# Patient Record
Sex: FEMALE | Race: WHITE | NOT HISPANIC OR LATINO
[De-identification: names, ages, dates, MRNs, and addresses within clinical notes are randomized per-mention and may not be internally consistent; named-entity substitution may affect disease eponyms.]

---

## 2017-03-20 ENCOUNTER — MEDICATION RENEWAL (OUTPATIENT)
Age: 27
End: 2017-03-20

## 2017-05-18 ENCOUNTER — MEDICATION RENEWAL (OUTPATIENT)
Age: 27
End: 2017-05-18

## 2017-07-17 ENCOUNTER — MEDICATION RENEWAL (OUTPATIENT)
Age: 27
End: 2017-07-17

## 2017-09-05 ENCOUNTER — INPATIENT (INPATIENT)
Facility: HOSPITAL | Age: 27
LOS: 1 days | Discharge: HOME | DRG: 864 | End: 2017-09-07
Attending: INTERNAL MEDICINE | Admitting: INTERNAL MEDICINE
Payer: COMMERCIAL

## 2017-09-05 VITALS
WEIGHT: 119.93 LBS | SYSTOLIC BLOOD PRESSURE: 110 MMHG | HEART RATE: 140 BPM | HEIGHT: 61 IN | OXYGEN SATURATION: 99 % | DIASTOLIC BLOOD PRESSURE: 78 MMHG | RESPIRATION RATE: 16 BRPM | TEMPERATURE: 100 F

## 2017-09-05 DIAGNOSIS — M35.1 OTHER OVERLAP SYNDROMES: ICD-10-CM

## 2017-09-05 DIAGNOSIS — Z29.9 ENCOUNTER FOR PROPHYLACTIC MEASURES, UNSPECIFIED: ICD-10-CM

## 2017-09-05 DIAGNOSIS — E03.9 HYPOTHYROIDISM, UNSPECIFIED: ICD-10-CM

## 2017-09-05 DIAGNOSIS — R51 HEADACHE: ICD-10-CM

## 2017-09-05 DIAGNOSIS — K21.9 GASTRO-ESOPHAGEAL REFLUX DISEASE WITHOUT ESOPHAGITIS: ICD-10-CM

## 2017-09-05 DIAGNOSIS — R50.9 FEVER, UNSPECIFIED: ICD-10-CM

## 2017-09-05 DIAGNOSIS — A41.9 SEPSIS, UNSPECIFIED ORGANISM: ICD-10-CM

## 2017-09-05 LAB
ALBUMIN SERPL ELPH-MCNC: 3.8 G/DL — SIGNIFICANT CHANGE UP (ref 3.3–5)
ALP SERPL-CCNC: 73 U/L — SIGNIFICANT CHANGE UP (ref 40–120)
ALT FLD-CCNC: 25 U/L — SIGNIFICANT CHANGE UP (ref 12–78)
ANION GAP SERPL CALC-SCNC: 11 MMOL/L — SIGNIFICANT CHANGE UP (ref 5–17)
ANISOCYTOSIS BLD QL: SLIGHT — SIGNIFICANT CHANGE UP
APPEARANCE UR: CLEAR — SIGNIFICANT CHANGE UP
APTT BLD: 29.1 SEC — SIGNIFICANT CHANGE UP (ref 27.5–37.4)
AST SERPL-CCNC: 26 U/L — SIGNIFICANT CHANGE UP (ref 15–37)
BACTERIA # UR AUTO: ABNORMAL
BILIRUB SERPL-MCNC: 0.5 MG/DL — SIGNIFICANT CHANGE UP (ref 0.2–1.2)
BILIRUB UR-MCNC: NEGATIVE — SIGNIFICANT CHANGE UP
BUN SERPL-MCNC: 10 MG/DL — SIGNIFICANT CHANGE UP (ref 7–23)
CALCIUM SERPL-MCNC: 8.6 MG/DL — SIGNIFICANT CHANGE UP (ref 8.5–10.1)
CHLORIDE SERPL-SCNC: 103 MMOL/L — SIGNIFICANT CHANGE UP (ref 96–108)
CO2 SERPL-SCNC: 24 MMOL/L — SIGNIFICANT CHANGE UP (ref 22–31)
COLOR SPEC: YELLOW — SIGNIFICANT CHANGE UP
COMMENT - URINE: SIGNIFICANT CHANGE UP
CREAT SERPL-MCNC: 0.71 MG/DL — SIGNIFICANT CHANGE UP (ref 0.5–1.3)
DIFF PNL FLD: NEGATIVE — SIGNIFICANT CHANGE UP
ELLIPTOCYTES BLD QL SMEAR: SLIGHT — SIGNIFICANT CHANGE UP
EPI CELLS # UR: SIGNIFICANT CHANGE UP
GLUCOSE SERPL-MCNC: 86 MG/DL — SIGNIFICANT CHANGE UP (ref 70–99)
GLUCOSE UR QL: NEGATIVE — SIGNIFICANT CHANGE UP
HCG SERPL-ACNC: <1 MIU/ML — SIGNIFICANT CHANGE UP
HCT VFR BLD CALC: 36.5 % — SIGNIFICANT CHANGE UP (ref 34.5–45)
HGB BLD-MCNC: 12.2 G/DL — SIGNIFICANT CHANGE UP (ref 11.5–15.5)
HYALINE CASTS # UR AUTO: ABNORMAL /LPF
INR BLD: 1.11 RATIO — SIGNIFICANT CHANGE UP (ref 0.88–1.16)
KETONES UR-MCNC: ABNORMAL
LACTATE SERPL-SCNC: 1.8 MMOL/L — SIGNIFICANT CHANGE UP (ref 0.7–2)
LEUKOCYTE ESTERASE UR-ACNC: ABNORMAL
LYMPHOCYTES # BLD AUTO: 9 % — LOW (ref 13–44)
MCHC RBC-ENTMCNC: 28.4 PG — SIGNIFICANT CHANGE UP (ref 27–34)
MCHC RBC-ENTMCNC: 33.5 GM/DL — SIGNIFICANT CHANGE UP (ref 32–36)
MCV RBC AUTO: 84.8 FL — SIGNIFICANT CHANGE UP (ref 80–100)
MICROCYTES BLD QL: SLIGHT — SIGNIFICANT CHANGE UP
MONOCYTES NFR BLD AUTO: 1 % — SIGNIFICANT CHANGE UP (ref 1–9)
NEUTROPHILS NFR BLD AUTO: 65 % — SIGNIFICANT CHANGE UP (ref 43–77)
NEUTS BAND # BLD: 25 % — HIGH (ref 0–8)
NITRITE UR-MCNC: NEGATIVE — SIGNIFICANT CHANGE UP
PH UR: 5 — SIGNIFICANT CHANGE UP (ref 5–8)
PLAT MORPH BLD: NORMAL — SIGNIFICANT CHANGE UP
PLATELET # BLD AUTO: 379 K/UL — SIGNIFICANT CHANGE UP (ref 150–400)
POIKILOCYTOSIS BLD QL AUTO: SLIGHT — SIGNIFICANT CHANGE UP
POTASSIUM SERPL-MCNC: 3.5 MMOL/L — SIGNIFICANT CHANGE UP (ref 3.5–5.3)
POTASSIUM SERPL-SCNC: 3.5 MMOL/L — SIGNIFICANT CHANGE UP (ref 3.5–5.3)
PROT SERPL-MCNC: 7.9 G/DL — SIGNIFICANT CHANGE UP (ref 6–8.3)
PROT UR-MCNC: 25 MG/DL
PROTHROM AB SERPL-ACNC: 12.1 SEC — SIGNIFICANT CHANGE UP (ref 9.8–12.7)
RAPID RVP RESULT: SIGNIFICANT CHANGE UP
RBC # BLD: 4.31 M/UL — SIGNIFICANT CHANGE UP (ref 3.8–5.2)
RBC # FLD: 12.4 % — SIGNIFICANT CHANGE UP (ref 10.3–14.5)
RBC BLD AUTO: SIGNIFICANT CHANGE UP
RBC CASTS # UR COMP ASSIST: ABNORMAL /HPF (ref 0–4)
SODIUM SERPL-SCNC: 138 MMOL/L — SIGNIFICANT CHANGE UP (ref 135–145)
SP GR SPEC: 1.02 — SIGNIFICANT CHANGE UP (ref 1.01–1.02)
UROBILINOGEN FLD QL: NEGATIVE — SIGNIFICANT CHANGE UP
WBC # BLD: 16.9 K/UL — HIGH (ref 3.8–10.5)
WBC # FLD AUTO: 16.9 K/UL — HIGH (ref 3.8–10.5)
WBC UR QL: ABNORMAL

## 2017-09-05 PROCEDURE — 99285 EMERGENCY DEPT VISIT HI MDM: CPT

## 2017-09-05 PROCEDURE — 71010: CPT | Mod: 26

## 2017-09-05 PROCEDURE — 70450 CT HEAD/BRAIN W/O DYE: CPT | Mod: 26

## 2017-09-05 RX ORDER — HYDROXYCHLOROQUINE SULFATE 200 MG
200 TABLET ORAL DAILY
Qty: 0 | Refills: 0 | Status: DISCONTINUED | OUTPATIENT
Start: 2017-09-05 | End: 2017-09-07

## 2017-09-05 RX ORDER — ENOXAPARIN SODIUM 100 MG/ML
40 INJECTION SUBCUTANEOUS EVERY 24 HOURS
Qty: 0 | Refills: 0 | Status: DISCONTINUED | OUTPATIENT
Start: 2017-09-05 | End: 2017-09-07

## 2017-09-05 RX ORDER — AZITHROMYCIN 500 MG/1
500 TABLET, FILM COATED ORAL EVERY 24 HOURS
Qty: 0 | Refills: 0 | Status: DISCONTINUED | OUTPATIENT
Start: 2017-09-06 | End: 2017-09-06

## 2017-09-05 RX ORDER — CEFTRIAXONE 500 MG/1
1 INJECTION, POWDER, FOR SOLUTION INTRAMUSCULAR; INTRAVENOUS EVERY 24 HOURS
Qty: 0 | Refills: 0 | Status: DISCONTINUED | OUTPATIENT
Start: 2017-09-06 | End: 2017-09-06

## 2017-09-05 RX ORDER — OXYCODONE AND ACETAMINOPHEN 5; 325 MG/1; MG/1
1 TABLET ORAL EVERY 4 HOURS
Qty: 0 | Refills: 0 | Status: DISCONTINUED | OUTPATIENT
Start: 2017-09-05 | End: 2017-09-07

## 2017-09-05 RX ORDER — CEFTRIAXONE 500 MG/1
1 INJECTION, POWDER, FOR SOLUTION INTRAMUSCULAR; INTRAVENOUS ONCE
Qty: 0 | Refills: 0 | Status: COMPLETED | OUTPATIENT
Start: 2017-09-05 | End: 2017-09-05

## 2017-09-05 RX ORDER — ONDANSETRON 8 MG/1
4 TABLET, FILM COATED ORAL ONCE
Qty: 0 | Refills: 0 | Status: COMPLETED | OUTPATIENT
Start: 2017-09-05 | End: 2017-09-05

## 2017-09-05 RX ORDER — MORPHINE SULFATE 50 MG/1
2 CAPSULE, EXTENDED RELEASE ORAL EVERY 4 HOURS
Qty: 0 | Refills: 0 | Status: DISCONTINUED | OUTPATIENT
Start: 2017-09-05 | End: 2017-09-07

## 2017-09-05 RX ORDER — VANCOMYCIN HCL 1 G
1000 VIAL (EA) INTRAVENOUS ONCE
Qty: 0 | Refills: 0 | Status: COMPLETED | OUTPATIENT
Start: 2017-09-05 | End: 2017-09-05

## 2017-09-05 RX ORDER — SODIUM CHLORIDE 9 MG/ML
1000 INJECTION INTRAMUSCULAR; INTRAVENOUS; SUBCUTANEOUS
Qty: 0 | Refills: 0 | Status: DISCONTINUED | OUTPATIENT
Start: 2017-09-05 | End: 2017-09-07

## 2017-09-05 RX ORDER — SODIUM CHLORIDE 9 MG/ML
1000 INJECTION INTRAMUSCULAR; INTRAVENOUS; SUBCUTANEOUS
Qty: 0 | Refills: 0 | Status: COMPLETED | OUTPATIENT
Start: 2017-09-05 | End: 2017-09-05

## 2017-09-05 RX ORDER — PANTOPRAZOLE SODIUM 20 MG/1
40 TABLET, DELAYED RELEASE ORAL
Qty: 0 | Refills: 0 | Status: DISCONTINUED | OUTPATIENT
Start: 2017-09-05 | End: 2017-09-07

## 2017-09-05 RX ORDER — ONDANSETRON 8 MG/1
4 TABLET, FILM COATED ORAL EVERY 6 HOURS
Qty: 0 | Refills: 0 | Status: DISCONTINUED | OUTPATIENT
Start: 2017-09-05 | End: 2017-09-05

## 2017-09-05 RX ORDER — ONDANSETRON 8 MG/1
4 TABLET, FILM COATED ORAL EVERY 4 HOURS
Qty: 0 | Refills: 0 | Status: DISCONTINUED | OUTPATIENT
Start: 2017-09-05 | End: 2017-09-07

## 2017-09-05 RX ORDER — VANCOMYCIN HCL 1 G
1000 VIAL (EA) INTRAVENOUS EVERY 24 HOURS
Qty: 0 | Refills: 0 | Status: DISCONTINUED | OUTPATIENT
Start: 2017-09-06 | End: 2017-09-07

## 2017-09-05 RX ORDER — HYDROXYCHLOROQUINE SULFATE 200 MG
125 TABLET ORAL DAILY
Qty: 0 | Refills: 0 | Status: DISCONTINUED | OUTPATIENT
Start: 2017-09-05 | End: 2017-09-05

## 2017-09-05 RX ORDER — AZITHROMYCIN 500 MG/1
500 TABLET, FILM COATED ORAL ONCE
Qty: 0 | Refills: 0 | Status: COMPLETED | OUTPATIENT
Start: 2017-09-05 | End: 2017-09-05

## 2017-09-05 RX ORDER — VANCOMYCIN HCL 1 G
VIAL (EA) INTRAVENOUS
Qty: 0 | Refills: 0 | Status: DISCONTINUED | OUTPATIENT
Start: 2017-09-05 | End: 2017-09-07

## 2017-09-05 RX ORDER — LEVOTHYROXINE SODIUM 125 MCG
137 TABLET ORAL DAILY
Qty: 0 | Refills: 0 | Status: DISCONTINUED | OUTPATIENT
Start: 2017-09-05 | End: 2017-09-07

## 2017-09-05 RX ORDER — OXYCODONE AND ACETAMINOPHEN 5; 325 MG/1; MG/1
2 TABLET ORAL EVERY 4 HOURS
Qty: 0 | Refills: 0 | Status: DISCONTINUED | OUTPATIENT
Start: 2017-09-05 | End: 2017-09-07

## 2017-09-05 RX ORDER — ACETAMINOPHEN 500 MG
650 TABLET ORAL ONCE
Qty: 0 | Refills: 0 | Status: COMPLETED | OUTPATIENT
Start: 2017-09-05 | End: 2017-09-05

## 2017-09-05 RX ORDER — ACETAMINOPHEN 500 MG
650 TABLET ORAL EVERY 6 HOURS
Qty: 0 | Refills: 0 | Status: DISCONTINUED | OUTPATIENT
Start: 2017-09-05 | End: 2017-09-07

## 2017-09-05 RX ORDER — CYCLOBENZAPRINE HYDROCHLORIDE 10 MG/1
5 TABLET, FILM COATED ORAL THREE TIMES A DAY
Qty: 0 | Refills: 0 | Status: DISCONTINUED | OUTPATIENT
Start: 2017-09-05 | End: 2017-09-07

## 2017-09-05 RX ORDER — ONDANSETRON 8 MG/1
4 TABLET, FILM COATED ORAL EVERY 4 HOURS
Qty: 0 | Refills: 0 | Status: DISCONTINUED | OUTPATIENT
Start: 2017-09-05 | End: 2017-09-05

## 2017-09-05 RX ADMIN — MORPHINE SULFATE 2 MILLIGRAM(S): 50 CAPSULE, EXTENDED RELEASE ORAL at 20:25

## 2017-09-05 RX ADMIN — MORPHINE SULFATE 2 MILLIGRAM(S): 50 CAPSULE, EXTENDED RELEASE ORAL at 21:00

## 2017-09-05 RX ADMIN — AZITHROMYCIN 255 MILLIGRAM(S): 500 TABLET, FILM COATED ORAL at 18:18

## 2017-09-05 RX ADMIN — Medication 650 MILLIGRAM(S): at 15:54

## 2017-09-05 RX ADMIN — OXYCODONE AND ACETAMINOPHEN 1 TABLET(S): 5; 325 TABLET ORAL at 22:11

## 2017-09-05 RX ADMIN — SODIUM CHLORIDE 75 MILLILITER(S): 9 INJECTION INTRAMUSCULAR; INTRAVENOUS; SUBCUTANEOUS at 20:24

## 2017-09-05 RX ADMIN — CEFTRIAXONE 100 GRAM(S): 500 INJECTION, POWDER, FOR SOLUTION INTRAMUSCULAR; INTRAVENOUS at 17:48

## 2017-09-05 RX ADMIN — MORPHINE SULFATE 2 MILLIGRAM(S): 50 CAPSULE, EXTENDED RELEASE ORAL at 23:32

## 2017-09-05 RX ADMIN — ONDANSETRON 4 MILLIGRAM(S): 8 TABLET, FILM COATED ORAL at 20:25

## 2017-09-05 RX ADMIN — SODIUM CHLORIDE 1000 MILLILITER(S): 9 INJECTION INTRAMUSCULAR; INTRAVENOUS; SUBCUTANEOUS at 14:45

## 2017-09-05 RX ADMIN — SODIUM CHLORIDE 1000 MILLILITER(S): 9 INJECTION INTRAMUSCULAR; INTRAVENOUS; SUBCUTANEOUS at 15:45

## 2017-09-05 RX ADMIN — ONDANSETRON 4 MILLIGRAM(S): 8 TABLET, FILM COATED ORAL at 17:49

## 2017-09-05 RX ADMIN — CYCLOBENZAPRINE HYDROCHLORIDE 5 MILLIGRAM(S): 10 TABLET, FILM COATED ORAL at 23:02

## 2017-09-05 RX ADMIN — Medication 250 MILLIGRAM(S): at 23:00

## 2017-09-05 NOTE — H&P ADULT - NSHPPHYSICALEXAM_GEN_ALL_CORE
Physical Exam:  General: Well developed, well nourished, NAD  HEENT: NCAT, PERRLA, EOMI bl, moist mucous membranes   Neck: Supple, nontender  Neurology: A&Ox3, pt able to move all 4 extremities and reposition self in bed  Respiratory: CTA B/L, No W/R/R  CV: regular rhythm, elevated rate, +S1/S2, no murmurs, rubs or gallops  Abdominal: Soft, NT, ND +BSx4  Extremities: No C/C/E, + peripheral pulses, no calf tenderness  MSK: no joint swelling, no tenderness to palpation on back, neck, or b/l legs   Skin: warm, dry, normal color, petechial rash over face and chest (baseline as per pt and family) Physical Exam:  Vital Signs Last 24 Hrs  T(C): 37.2 (05 Sep 2017 16:40), Max: 37.8 (05 Sep 2017 14:10)  T(F): 98.9 (05 Sep 2017 16:40), Max: 100.1 (05 Sep 2017 14:10)  HR: 118 (05 Sep 2017 16:40) (118 - 140)  BP: 122/74 (05 Sep 2017 16:40) (110/78 - 122/74)  BP(mean): --  RR: 16 (05 Sep 2017 16:40) (16 - 16)  SpO2: 100% (05 Sep 2017 16:40) (99% - 100%)    General: Well developed, well nourished, NAD  HEENT: NCAT, PERRLA, EOMI bl, moist mucous membranes   Neck: Supple, nontender  Neurology: A&Ox3, pt able to move all 4 extremities and reposition self in bed, negative Brudzinski's and Kerning's signs  Respiratory: CTA B/L, No W/R/R  CV: regular rhythm, elevated rate, +S1/S2, no murmurs, rubs or gallops  Abdominal: Soft, NT, ND +BSx4  Extremities: No C/C/E, + peripheral pulses, no calf tenderness  MSK: no joint swelling, no tenderness to palpation on back, neck, or b/l legs   Skin: warm, dry, normal color, petechial rash over face and chest (baseline as per pt and family)

## 2017-09-05 NOTE — ED ADULT NURSE NOTE - OBJECTIVE STATEMENT
Pt reports Hx autoimmune disease. Pt states she developed a severe headache then began to experience neck/upper back & B/L leg pain. Pt is also C/O N/V. Pt reports fever but is unsure of Tmax

## 2017-09-05 NOTE — H&P ADULT - PROBLEM SELECTOR PLAN 5
lovenox  protonix DVT prophylaxis: lovenox  GI prophylaxis: protonix continue protonix - Chronic  - continue protonix

## 2017-09-05 NOTE — ED PROVIDER NOTE - PROGRESS NOTE DETAILS
Pt with moderate improvement, less tachycardia. Still with nausea, vomited. Mild persistent ha. No photopphobia.   Discussed with patient and family regarding symptoms, labs, and clinical presentation. Discussed the possibility of meningitis. Discussed the risks, benefits, and alternatives of lumbar puncture. Discussed the risks associated with missing a potential serious diagnosis if no LP is done. An opportunity to ask questions was given. After answering all questions and adressing all concerns it was decided by the patient that they will be refusing LP. Understanding of risks was verbalized. A copy of all available results was explained and given. Patient will follow-up with outpatient medical doctor as soon as possible. Dw Dr TANVI Nicole, will see pt to admit. Agree with abx Pt with moderate improvement, less tachycardia. Still with nausea, vomited. Mild persistent ha. No photopphobia.   Discussed with patient and family regarding symptoms, labs, and clinical presentation. Discussed the possibility of meningitis. Discussed the risks, benefits, and alternatives of lumbar puncture. Discussed the risks associated with missing a potential serious diagnosis if no LP is done. An opportunity to ask questions was given. After answering all questions and adressing all concerns it was decided by the patient that they will be refusing LP. Understanding of risks was verbalized.Pt will allow admission for further eval.

## 2017-09-05 NOTE — ED PROVIDER NOTE - OBJECTIVE STATEMENT
28 yo F p/w fever, chills and myalgias x past ~ 1 day, onset this am. Some gen headache. no neck pain / stiffness. NO abd pain. No n/v/d. No cough / sputum. no rash. NO recent inj / trauma. No agg/allev factors. NO other inj or co.

## 2017-09-05 NOTE — H&P ADULT - PROBLEM SELECTOR PLAN 6
DVT prophylaxis: lovenox  GI prophylaxis: protonix - DVT prophylaxis: lovenox  - GI prophylaxis: protonix

## 2017-09-05 NOTE — PROGRESS NOTE ADULT - SUBJECTIVE AND OBJECTIVE BOX
neuro cons dict.  FERNANDEZ WITH FEVER, R/O CNS INFECTION.  REFUSING LP.  ROCEPHIN VANCOMYYCIN.  ID EVALUATION.  ANALGESIC,

## 2017-09-05 NOTE — ED PROVIDER NOTE - CHPI ED SYMPTOMS NEG
no vomiting/no chills/no abdominal pain/no decreased eating/drinking/no diarrhea/no cough/no shortness of breath

## 2017-09-05 NOTE — H&P ADULT - PROBLEM SELECTOR PLAN 1
possibly due to pneumonia (xray bilateral infiltrates)  iv rocephin z max  follow up all cultures, add on stat procalcitonin  id consult dr ferris et al  ivf ns75 hr possibly due to pneumonia (xray bilateral infiltrates)  stat rvp (neg)  iv rocephin z max bacid (1 tid)  follow up all cultures, add on stat procalcitonin  id consult dr perry al  ivf ns75 hr possibly due to pneumonia (xray bilateral infiltrates)  stat rvp (neg)  iv rocephin z max bacid (1 tid)  follow up all cultures, add on stat procalcitonin  id consult dr ferris et al  tylenol prn for fever  ivf ns75 hr  f/u blood and urine cxs  f/u AM labs possibly due to pneumonia (xray bilateral infiltrates)  stat rvp (neg)  iv rocephin z max bacid (1 tid)  follow up all cultures, add on stat procalcitonin  Neuro consult (Dr. Walsh) to r/o CNS infxn, pt refused LP  id consult dr ferris et al  tylenol prn for fever  ivf ns75 hr  f/u blood and urine cxs  f/u AM labs admit to remote tele  possibly due to pneumonia (xray bilateral infiltrates)  stat rvp (neg)  IV rocephin, IV zithromax, IV vanco  ID consult (Dr. Bermudez), recommendations are appreciated  tylenol prn for fever  zofran prn for nausea  percocet prn for mild and moderate pain  morphine prn for severe pain  flexeril prn for muscle stiffness  ivf ns75 hr  f/u stat procalcitonin  f/u blood and urine cxs  f/u AM labs - admit to remote tele  - possibly due to pneumonia (xray bilateral infiltrates)  - RVP negative  - Procalcitonin mildly elevated  - Continue IV rocephin, IV zithromax, IV vanco  - ID consult (Dr. Bermudez), recommendations are appreciated  - tylenol prn for fever  - zofran prn for nausea  - percocet prn for mild and moderate pain  - morphine prn for severe pain  - flexeril prn for muscle stiffness  - ivf ns75 hr  - f/u blood and urine cxs  - f/u AM labs

## 2017-09-05 NOTE — H&P ADULT - ASSESSMENT
26 y/o F with PMH of Mixed Connective Tissue Disease (MCTD), hypothyroidism, and GERD presents to the ED with HA and vomiting x 1 day, admitted for sepsis 2/2 possible PNA.

## 2017-09-05 NOTE — H&P ADULT - PROBLEM SELECTOR PLAN 2
protonix continue protonix no neck stiffness, negative Brudzinski's and Kerning's signs  Neuro (Dr. Walsh) consulted, pt refused LP - no neck stiffness, negative Brudzinski's and Kerning's signs  - Neuro (Dr. Walsh) consulted, pt refused LP

## 2017-09-05 NOTE — H&P ADULT - NSHPREVIEWOFSYSTEMS_GEN_ALL_CORE
Constitutional: Positive: chills, fever (in the ED); denies diaphoresis   HEENT: denies blurry vision, photophobia  Respiratory: denies SOB, JENSEN, cough, sputum production  Cardiovascular: denies CP, palpitations, edema  Gastrointestinal: Positive: nausea, vomiting; denies diarrhea, constipation, abdominal pain, melena, hematochezia   Genitourinary: denies dysuria, frequency, urgency, hematuria   Skin/Breast: Positive: petechial rash on chest and face (chronic, baseline as per pt and family)  Musculoskeletal: Positive: myalgias in neck, upper back, and posterior legs; denies joint swelling, muscle weakness  Neurologic: Positive: headache, lightheadedness/dizziness when changing positions (orthostatic); denies weakness  Psychiatric: denies feeling anxious, depressed  Hematology/Oncology: denies bruising   ROS negative except as noted above

## 2017-09-05 NOTE — H&P ADULT - ATTENDING COMMENTS
pt seen examined and admitted  here w leukocytosis meeting criteria for sepsis. ua pos however no urinary sx. c/o headache and severe mylagias all over sx typical of her mctd.   neck "sore" not stiff, supple full rom, neg brudzinski no photophobia  does not want spinal tap (declined w er doctor and neuro last night as well)  although orlin infiltrates on cxr no cough or uri sx whatsover and lungs clear   id consult dr howard  will discuss again w dr glaser  time spent on admission 65 mins

## 2017-09-05 NOTE — ED PROVIDER NOTE - ENMT, MLM
Airway patent, Nasal mucosa clear. Mouth with normal mucosa. Throat has no vesicles, no oropharyngeal exudates and uvula is midline. MM Moist. Neck supple. no meningeal signs.l

## 2017-09-05 NOTE — ED ADULT NURSE REASSESSMENT NOTE - NS ED NURSE REASSESS COMMENT FT1
spoke with the Resident Physician Leslee who is in the department; re: isolation per MD no need to isolate the patient right now and they will consulting ID for patient's fever; MD also made aware that patient is tachycardic; MD stated she is aware reason she ordered for remote telemetry for the patient; pain medications are also ordered for the patient PRN.

## 2017-09-05 NOTE — H&P ADULT - NSHPSOCIALHISTORY_GEN_ALL_CORE
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

## 2017-09-06 LAB
ANION GAP SERPL CALC-SCNC: 11 MMOL/L — SIGNIFICANT CHANGE UP (ref 5–17)
BUN SERPL-MCNC: 10 MG/DL — SIGNIFICANT CHANGE UP (ref 7–23)
C3 SERPL-MCNC: 96 MG/DL — SIGNIFICANT CHANGE UP (ref 80–180)
C4 SERPL-MCNC: 15 MG/DL — SIGNIFICANT CHANGE UP (ref 10–45)
CALCIUM SERPL-MCNC: 7.7 MG/DL — LOW (ref 8.5–10.1)
CHLORIDE SERPL-SCNC: 104 MMOL/L — SIGNIFICANT CHANGE UP (ref 96–108)
CO2 SERPL-SCNC: 23 MMOL/L — SIGNIFICANT CHANGE UP (ref 22–31)
CREAT SERPL-MCNC: 0.5 MG/DL — SIGNIFICANT CHANGE UP (ref 0.5–1.3)
CRP SERPL-MCNC: 13.9 MG/DL — HIGH (ref 0–0.4)
CULTURE RESULTS: NO GROWTH — SIGNIFICANT CHANGE UP
ERYTHROCYTE [SEDIMENTATION RATE] IN BLOOD: 22 MM/HR — HIGH (ref 0–15)
GLUCOSE SERPL-MCNC: 97 MG/DL — SIGNIFICANT CHANGE UP (ref 70–99)
HCT VFR BLD CALC: 29.6 % — LOW (ref 34.5–45)
HGB BLD-MCNC: 10.1 G/DL — LOW (ref 11.5–15.5)
MCHC RBC-ENTMCNC: 28.4 PG — SIGNIFICANT CHANGE UP (ref 27–34)
MCHC RBC-ENTMCNC: 34.1 GM/DL — SIGNIFICANT CHANGE UP (ref 32–36)
MCV RBC AUTO: 83.4 FL — SIGNIFICANT CHANGE UP (ref 80–100)
PLATELET # BLD AUTO: 323 K/UL — SIGNIFICANT CHANGE UP (ref 150–400)
POTASSIUM SERPL-MCNC: 3.5 MMOL/L — SIGNIFICANT CHANGE UP (ref 3.5–5.3)
POTASSIUM SERPL-SCNC: 3.5 MMOL/L — SIGNIFICANT CHANGE UP (ref 3.5–5.3)
RBC # BLD: 3.55 M/UL — LOW (ref 3.8–5.2)
RBC # FLD: 12.2 % — SIGNIFICANT CHANGE UP (ref 10.3–14.5)
SODIUM SERPL-SCNC: 138 MMOL/L — SIGNIFICANT CHANGE UP (ref 135–145)
SPECIMEN SOURCE: SIGNIFICANT CHANGE UP
WBC # BLD: 25.5 K/UL — HIGH (ref 3.8–10.5)
WBC # FLD AUTO: 25.5 K/UL — HIGH (ref 3.8–10.5)

## 2017-09-06 PROCEDURE — 70553 MRI BRAIN STEM W/O & W/DYE: CPT | Mod: 26

## 2017-09-06 RX ORDER — CEFEPIME 1 G/1
1000 INJECTION, POWDER, FOR SOLUTION INTRAMUSCULAR; INTRAVENOUS EVERY 8 HOURS
Qty: 0 | Refills: 0 | Status: DISCONTINUED | OUTPATIENT
Start: 2017-09-06 | End: 2017-09-07

## 2017-09-06 RX ORDER — CEFEPIME 1 G/1
INJECTION, POWDER, FOR SOLUTION INTRAMUSCULAR; INTRAVENOUS
Qty: 0 | Refills: 0 | Status: DISCONTINUED | OUTPATIENT
Start: 2017-09-06 | End: 2017-09-07

## 2017-09-06 RX ORDER — CEFEPIME 1 G/1
1000 INJECTION, POWDER, FOR SOLUTION INTRAMUSCULAR; INTRAVENOUS ONCE
Qty: 0 | Refills: 0 | Status: COMPLETED | OUTPATIENT
Start: 2017-09-06 | End: 2017-09-06

## 2017-09-06 RX ADMIN — CYCLOBENZAPRINE HYDROCHLORIDE 5 MILLIGRAM(S): 10 TABLET, FILM COATED ORAL at 21:23

## 2017-09-06 RX ADMIN — Medication 137 MICROGRAM(S): at 05:45

## 2017-09-06 RX ADMIN — MORPHINE SULFATE 2 MILLIGRAM(S): 50 CAPSULE, EXTENDED RELEASE ORAL at 00:17

## 2017-09-06 RX ADMIN — ONDANSETRON 4 MILLIGRAM(S): 8 TABLET, FILM COATED ORAL at 20:07

## 2017-09-06 RX ADMIN — Medication 200 MILLIGRAM(S): at 11:54

## 2017-09-06 RX ADMIN — Medication 250 MILLIGRAM(S): at 20:06

## 2017-09-06 RX ADMIN — ONDANSETRON 4 MILLIGRAM(S): 8 TABLET, FILM COATED ORAL at 04:14

## 2017-09-06 RX ADMIN — MORPHINE SULFATE 2 MILLIGRAM(S): 50 CAPSULE, EXTENDED RELEASE ORAL at 05:43

## 2017-09-06 RX ADMIN — CYCLOBENZAPRINE HYDROCHLORIDE 5 MILLIGRAM(S): 10 TABLET, FILM COATED ORAL at 08:50

## 2017-09-06 RX ADMIN — OXYCODONE AND ACETAMINOPHEN 2 TABLET(S): 5; 325 TABLET ORAL at 20:06

## 2017-09-06 RX ADMIN — ENOXAPARIN SODIUM 40 MILLIGRAM(S): 100 INJECTION SUBCUTANEOUS at 08:53

## 2017-09-06 RX ADMIN — CEFEPIME 100 MILLIGRAM(S): 1 INJECTION, POWDER, FOR SOLUTION INTRAMUSCULAR; INTRAVENOUS at 13:23

## 2017-09-06 RX ADMIN — OXYCODONE AND ACETAMINOPHEN 2 TABLET(S): 5; 325 TABLET ORAL at 21:00

## 2017-09-06 RX ADMIN — OXYCODONE AND ACETAMINOPHEN 2 TABLET(S): 5; 325 TABLET ORAL at 12:51

## 2017-09-06 RX ADMIN — OXYCODONE AND ACETAMINOPHEN 2 TABLET(S): 5; 325 TABLET ORAL at 11:51

## 2017-09-06 RX ADMIN — CEFEPIME 100 MILLIGRAM(S): 1 INJECTION, POWDER, FOR SOLUTION INTRAMUSCULAR; INTRAVENOUS at 22:59

## 2017-09-06 RX ADMIN — SODIUM CHLORIDE 75 MILLILITER(S): 9 INJECTION INTRAMUSCULAR; INTRAVENOUS; SUBCUTANEOUS at 11:53

## 2017-09-06 RX ADMIN — OXYCODONE AND ACETAMINOPHEN 1 TABLET(S): 5; 325 TABLET ORAL at 00:17

## 2017-09-06 RX ADMIN — MORPHINE SULFATE 2 MILLIGRAM(S): 50 CAPSULE, EXTENDED RELEASE ORAL at 04:14

## 2017-09-06 RX ADMIN — OXYCODONE AND ACETAMINOPHEN 2 TABLET(S): 5; 325 TABLET ORAL at 05:45

## 2017-09-06 RX ADMIN — OXYCODONE AND ACETAMINOPHEN 2 TABLET(S): 5; 325 TABLET ORAL at 06:45

## 2017-09-06 RX ADMIN — PANTOPRAZOLE SODIUM 40 MILLIGRAM(S): 20 TABLET, DELAYED RELEASE ORAL at 05:45

## 2017-09-06 NOTE — CONSULT NOTE ADULT - ASSESSMENT
26 yo female with ZUNILDA adm with vomiting, fever, headache, leukocytosis, bandemia and abn CXR with no obv localizing symptoms.

## 2017-09-06 NOTE — CONSULT NOTE ADULT - PROBLEM SELECTOR RECOMMENDATION 9
No clear localization and lung exam normal despite CXR. Absence of localizing symptoms dose raise the concern of distinguishing between an acute exacerbation of ZUNILDA versus acute infectious process.  At this point with rising WBC and bandemia will adjust abx and follow cultures while attempting to distinguish infectious from autoimmune components. PCN intolerance was reported as a rash that developed several days after finishing course of abx for wisdom teeth and pt is tolerating cephalosporins.

## 2017-09-06 NOTE — CONSULT NOTE ADULT - SUBJECTIVE AND OBJECTIVE BOX
HPI:  28 y/o F with PMH of Mixed Connective Tissue Disease (MCTD), hypothyroidism, and GERD presents to the ED with HA and vomiting x 1 day. Pt states that at 10:30 AM the morning of admission she began having a HA, nausea, and vomiting. She notes having vomited up yellow liquid 6-7x today, denies hematemesis. Pt describes the HA as "throbbing" beginning at the base of her neck and radiating to the top of her head. She also notes having associated "throbbing" muscle pain in her neck, upper back, and backs of her legs, which are 7/10 on pain scale. Pt states that whenever she is sick she normally has increased muscle stiffness 2/2 her MCTD. She also notes having intermittent HAs last week. Pt denies sick contacts.    In the ED Pt VS /78, , RR16, T100.1F, SpO2 99% on RA. Labs significant for WBC 16.9 with increased band PMNs, lactate WNL. Rapid RVP not detected. UA neg. EKG showed sinus tach at 130bpm. CXR bibasilar infiltrates. HCT negative for acute pathology. Urine Cx and Blood Cxs collected. Pt received Zithromax and Rocephin IV x1, 2L NS bolus, tylenol x1, zofran x1. Pt was seen by neurology (Dr. Walsh) to r/o CNS infection, pt refused LP. (05 Sep 2017 18:22)      PAST MEDICAL & SURGICAL HISTORY:  GERD (gastroesophageal reflux disease)  Hypothyroid  Mixed connective tissue disease  No significant past surgical history      Antimicrobials  hydroxychloroquine 200 milliGRAM(s) Oral daily  vancomycin  IVPB   IV Intermittent   azithromycin  IVPB 500 milliGRAM(s) IV Intermittent every 24 hours  cefTRIAXone   IVPB 1 Gram(s) IV Intermittent every 24 hours  vancomycin  IVPB 1000 milliGRAM(s) IV Intermittent every 24 hours      Immunological      Other  pantoprazole    Tablet 40 milliGRAM(s) Oral before breakfast  levothyroxine 137 MICROGram(s) Oral daily  enoxaparin Injectable 40 milliGRAM(s) SubCutaneous every 24 hours  sodium chloride 0.9%. 1000 milliLiter(s) IV Continuous <Continuous>  acetaminophen   Tablet 650 milliGRAM(s) Oral every 6 hours PRN  cyclobenzaprine 5 milliGRAM(s) Oral three times a day PRN  morphine  - Injectable 2 milliGRAM(s) IV Push every 4 hours PRN  oxyCODONE    5 mG/acetaminophen 325 mG 1 Tablet(s) Oral every 4 hours PRN  oxyCODONE    5 mG/acetaminophen 325 mG 2 Tablet(s) Oral every 4 hours PRN  ondansetron Injectable 4 milliGRAM(s) IV Push every 4 hours PRN      Allergies    penicillin (Unknown)  sulfa drugs (Unknown)    Intolerances        SOCIAL HISTORY: no tobacco, no sig travel, has a new puppy    FAMILY HISTORY:  No pertinent family history in first degree relatives      ROS:    EYES:  Negative  blurry vision or double vision  GASTROINTESTINAL:  Negative for sinus pain, no dyspnea, no cough, no urinary symptoms, diarrhea  -otherwise negative except for subjective    Vital Signs Last 24 Hrs  T(C): 36.9 (06 Sep 2017 05:30), Max: 37.8 (05 Sep 2017 14:10)  T(F): 98.5 (06 Sep 2017 05:30), Max: 100.1 (05 Sep 2017 14:10)  HR: 72 (06 Sep 2017 05:30) (72 - 140)  BP: 99/65 (06 Sep 2017 05:30) (92/60 - 122/74)  BP(mean): --  RR: 18 (06 Sep 2017 05:30) (16 - 18)  SpO2: 99% (06 Sep 2017 05:30) (94% - 100%)    PE:  WDWN in no distress  HEENT:  NC, PERRL, sclerae anicteric, conjunctivae clear, EOMI.  Sinuses nontender, no nasal exudate.  No buccal or pharyngeal lesions, erythema or exudate  Neck:  Supple, no adenopathy  Lungs:  No accessory muscle use, bilaterally clear to auscultation  Cor:  RRR, S1, S2, no murmur appreciated  Abd:  Symmetric, normoactive BS.  Soft, nontender, no masses, guarding or rebound.  Liver and spleen not enlarged  Extrem:  No cyanosis or edema  Skin:  No rashes.  Neuro: grossly intact  Musc: moving all limbs freely, no focal deficits    LABS:                        10.1   25.5  )-----------( 323      ( 06 Sep 2017 06:35 )             29.6     WBC Count: 16.9 K/uL (17 @ 15:01)    Band Neutrophils %: 25.0 % (17 @ 15:01)    Band Neutrophils %: 25.0 % (17 @ 15:01)            138  |  104  |  10  ----------------------------<  97  3.5   |  23  |  0.50    Ca    7.7<L>      06 Sep 2017 06:35    TPro  7.9  /  Alb  3.8  /  TBili  0.5  /  DBili  x   /  AST  26  /  ALT  25  /  AlkPhos  73  09-05    Urinalysis Basic - ( 05 Sep 2017 17:39 )    Color: Yellow / Appearance: Clear / S.020 / pH: x  Gluc: x / Ketone: Small  / Bili: Negative / Urobili: Negative   Blood: x / Protein: 25 mg/dL / Nitrite: Negative   Leuk Esterase: Moderate / RBC: 3-5 /HPF / WBC 26-50   Sq Epi: x / Non Sq Epi: Few / Bacteria: Few        MICROBIOLOGY:      RADIOLOGY & ADDITIONAL STUDIES:    --

## 2017-09-06 NOTE — PROGRESS NOTE ADULT - SUBJECTIVE AND OBJECTIVE BOX
Neurology Follow up note    CHRISTEN [id number]    HPI:  28 y/o F with PMH of Mixed Connective Tissue Disease (MCTD), hypothyroidism, and GERD presents to the ED with HA and vomiting x 1 day. Pt states that at 10:30 AM this morning she began having a HA, nausea, and vomiting. She notes having vomited up yellow liquid 6-7x today, denies hematemesis. Pt describes the HA as "throbbing" beginning at the base of her neck and radiating to the top of her head. She also notes having associated "throbbing" muscle pain in her neck, upper back, and backs of her legs, which are 7/10 on pain scale. Pt states that whenever she is sick she normally has increased muscle stiffness 2/2 her MCTD. She also notes having intermittent HAs last week. Pt denies sick contacts.    In the ED Pt VS /78, , RR16, T100.1F, SpO2 99% on RA. Labs significant for WBC 16.9 with increased band PMNs, lactate WNL. Rapid RVP not detected. UA neg. EKG showed sinus tach at 130bpm. CXR bibasilar infiltrates. HCT negative for acute pathology. Urine Cx and Blood Cxs collected. Pt received Zithromax and Rocephin IV x1, 2L NS bolus, tylenol x1, zofran x1. Pt was seen by neurology (Dr. Walsh) to r/o CNS infection, pt refused LP. (05 Sep 2017 18:22)      Interval History - HA better.    Patient is seen, chart was reviewed and case was discussed with the treatment team.  Pt is not in any distress.   Lying on bed comfortably.   No events reported overnight.   No clinical seizure was reported.  Sitting on chair bed comfortably.    is at bedside.    Vital Signs Last 24 Hrs  T(C): 36.6 (06 Sep 2017 12:37), Max: 37.2 (05 Sep 2017 16:40)  T(F): 97.9 (06 Sep 2017 12:37), Max: 98.9 (05 Sep 2017 16:40)  HR: 91 (06 Sep 2017 12:37) (72 - 120)  BP: 90/59 (06 Sep 2017 12:37) (90/59 - 122/74)  BP(mean): --  RR: 17 (06 Sep 2017 12:37) (16 - 18)  SpO2: 92% (06 Sep 2017 12:37) (92% - 100%)        REVIEW OF SYSTEMS:    Constitutional: No fever, weight loss or fatigue  Eyes: No eye pain, visual disturbances, or discharge  ENT:  No difficulty hearing, tinnitus, vertigo; No sinus or throat pain  Neck: No pain or stiffness  Respiratory: No cough, wheezing, chills or hemoptysis  Cardiovascular: No chest pain, palpitations, shortness of breath, dizziness or leg swelling  Gastrointestinal: No abdominal or epigastric pain. No nausea, vomiting or hematemesis; No diarrhea or constipation. No melena or hematochezia.  Genitourinary: No dysuria, frequency, hematuria or incontinence  Neurological: No , memory loss, loss of strength, numbness or tremors  Psychiatric: No depression, anxiety, mood swings or difficulty sleeping  Musculoskeletal: No joint pain or swelling; No muscle, back or extremity pain  Skin: No itching, burning, rashes or lesions   Lymph Nodes: No enlarged glands  Endocrine: No heat or cold intolerance; No hair loss, No h/o diabetes or thyroid dysfunction  Allergy and Immunologic: No hives or eczema    On Neurological Examination:    Mental Status - Pt is alert, awake, oriented X3. Follows commands well and able to answer questions appropriately .Mood and affect  normal    Speech -  Normal.    Cranial Nerves - Pupils 3 mm equal and reactive to light, extraocular eye movements intact. Pt has no visual field deficit.  Pt has no facial asymmetry. Facial sensation is intact.Tongue - is in midline.    Muscle tone - is NORMAL..      Motor Exam - 5/5 all over, No drift. No shaking or tremors.    Sensory Exam - Pin prick, temperature, joint position and vibration are intact on either side. Pt withdraws all extremities equally on stimulation. No asymmetry seen. No complaints of tingling, numbness.  .    coordination:    Finger to nose: normal.      Deep tendon Reflexes - 2 plus all over.        Romberg - Negative.    Neck Supple -  Yes.     MEDICATIONS    pantoprazole    Tablet 40 milliGRAM(s) Oral before breakfast  levothyroxine 137 MICROGram(s) Oral daily  enoxaparin Injectable 40 milliGRAM(s) SubCutaneous every 24 hours  hydroxychloroquine 200 milliGRAM(s) Oral daily  sodium chloride 0.9%. 1000 milliLiter(s) IV Continuous <Continuous>  acetaminophen   Tablet 650 milliGRAM(s) Oral every 6 hours PRN  cyclobenzaprine 5 milliGRAM(s) Oral three times a day PRN  morphine  - Injectable 2 milliGRAM(s) IV Push every 4 hours PRN  oxyCODONE    5 mG/acetaminophen 325 mG 1 Tablet(s) Oral every 4 hours PRN  oxyCODONE    5 mG/acetaminophen 325 mG 2 Tablet(s) Oral every 4 hours PRN  vancomycin  IVPB   IV Intermittent   vancomycin  IVPB 1000 milliGRAM(s) IV Intermittent every 24 hours  ondansetron Injectable 4 milliGRAM(s) IV Push every 4 hours PRN  cefepime  IVPB   IV Intermittent   cefepime  IVPB 1000 milliGRAM(s) IV Intermittent once  cefepime  IVPB 1000 milliGRAM(s) IV Intermittent every 8 hours      Allergies    penicillin (Unknown)  sulfa drugs (Unknown)    Intolerances        LABS:  CBC Full  -  ( 06 Sep 2017 06:35 )  WBC Count : 25.5 K/uL  Hemoglobin : 10.1 g/dL  Hematocrit : 29.6 %  Platelet Count - Automated : 323 K/uL  Mean Cell Volume : 83.4 fl  Mean Cell Hemoglobin : 28.4 pg  Mean Cell Hemoglobin Concentration : 34.1 gm/dL  Auto Neutrophil # : x  Auto Lymphocyte # : x  Auto Monocyte # : x  Auto Eosinophil # : x  Auto Basophil # : x  Auto Neutrophil % : 78.0 %  Auto Lymphocyte % : 5.0 %  Auto Monocyte % : 3.0 %  Auto Eosinophil % : x  Auto Basophil % : x    Urinalysis Basic - ( 05 Sep 2017 17:39 )    Color: Yellow / Appearance: Clear / S.020 / pH: x  Gluc: x / Ketone: Small  / Bili: Negative / Urobili: Negative   Blood: x / Protein: 25 mg/dL / Nitrite: Negative   Leuk Esterase: Moderate / RBC: 3-5 /HPF / WBC 26-50   Sq Epi: x / Non Sq Epi: Few / Bacteria: Few          138  |  104  |  10  ----------------------------<  97  3.5   |  23  |  0.50    Ca    7.7<L>      06 Sep 2017 06:35    TPro  7.9  /  Alb  3.8  /  TBili  0.5  /  DBili  x   /  AST  26  /  ALT  25  /  AlkPhos  73  -    Hemoglobin A1C:     Vitamin B12     RADIOLOGY.  < from: MRI Head w/wo Cont (17 @ 14:22) >    EXAM:  MRI BRAIN WO W CONT                            PROCEDURE DATE:  2017          INTERPRETATION:  MRI brain with and without contrast    History fever    Contrast Gadavist 5.5 cc; 2 cc discarded    Comparison CT performed the prior day    Cerebellar tonsils are slightly low-lying. There is no cortical edema,   mass effect or hydrocephalus. Cortical volume and white matter signal are   preserved. There is no evidence of acute infarct or previous parenchymal   hemorrhage. The orbital and sellar contents are within normal limits.   There is normal physiologic enhancement. The paranasal sinuses and   mastoid air cells appear aerated.    IMPRESSION:    Normal brain                SONIA CHRISTIAN M.D., ATTENDING RADIOLOGIST  This document has been electronically signed. Sep  6 2017  2:31PM            ASSESSMENT AND PLAN:      HA WITH FEVER D/D CNS INFECTION VS MCTD.  REFUSING LP.  BRAIN MRI UNREMARKABLE.    ANTIBIOTIC  AS PER ID.  ANALGESIC.  PIERCE GONZALEZ.  Physical therapy evaluation.  OOB to chair/ambulation with assistance only..  Would continue to follow.

## 2017-09-06 NOTE — CONSULT NOTE ADULT - PROBLEM SELECTOR RECOMMENDATION 2
evaluate with regard to whether this acute presentation is being driven by infectious versus autoimmune flair.    Thank you for consulting us and involving us in the management of this most interesting and challenging case.     We will follow along in the care of this patient.

## 2017-09-07 ENCOUNTER — TRANSCRIPTION ENCOUNTER (OUTPATIENT)
Age: 27
End: 2017-09-07

## 2017-09-07 VITALS
SYSTOLIC BLOOD PRESSURE: 126 MMHG | DIASTOLIC BLOOD PRESSURE: 88 MMHG | TEMPERATURE: 99 F | RESPIRATION RATE: 17 BRPM | OXYGEN SATURATION: 100 % | HEART RATE: 76 BPM

## 2017-09-07 DIAGNOSIS — D72.829 ELEVATED WHITE BLOOD CELL COUNT, UNSPECIFIED: ICD-10-CM

## 2017-09-07 LAB
ANA PAT FLD IF-IMP: ABNORMAL
ANA TITR SER: ABNORMAL
ANION GAP SERPL CALC-SCNC: 6 MMOL/L — SIGNIFICANT CHANGE UP (ref 5–17)
BASOPHILS # BLD AUTO: 0 K/UL — SIGNIFICANT CHANGE UP (ref 0–0.2)
BASOPHILS NFR BLD AUTO: 0.3 % — SIGNIFICANT CHANGE UP (ref 0–2)
BUN SERPL-MCNC: 6 MG/DL — LOW (ref 7–23)
CALCIUM SERPL-MCNC: 8.3 MG/DL — LOW (ref 8.5–10.1)
CHLORIDE SERPL-SCNC: 111 MMOL/L — HIGH (ref 96–108)
CO2 SERPL-SCNC: 26 MMOL/L — SIGNIFICANT CHANGE UP (ref 22–31)
CREAT SERPL-MCNC: 0.58 MG/DL — SIGNIFICANT CHANGE UP (ref 0.5–1.3)
EOSINOPHIL # BLD AUTO: 0.2 K/UL — SIGNIFICANT CHANGE UP (ref 0–0.5)
EOSINOPHIL NFR BLD AUTO: 1.7 % — SIGNIFICANT CHANGE UP (ref 0–6)
GLUCOSE SERPL-MCNC: 80 MG/DL — SIGNIFICANT CHANGE UP (ref 70–99)
HCT VFR BLD CALC: 29.5 % — LOW (ref 34.5–45)
HGB BLD-MCNC: 9.6 G/DL — LOW (ref 11.5–15.5)
LYMPHOCYTES # BLD AUTO: 2.3 K/UL — SIGNIFICANT CHANGE UP (ref 1–3.3)
LYMPHOCYTES # BLD AUTO: 22.1 % — SIGNIFICANT CHANGE UP (ref 13–44)
MCHC RBC-ENTMCNC: 27.6 PG — SIGNIFICANT CHANGE UP (ref 27–34)
MCHC RBC-ENTMCNC: 32.6 GM/DL — SIGNIFICANT CHANGE UP (ref 32–36)
MCV RBC AUTO: 84.6 FL — SIGNIFICANT CHANGE UP (ref 80–100)
MONOCYTES # BLD AUTO: 0.7 K/UL — SIGNIFICANT CHANGE UP (ref 0–0.9)
MONOCYTES NFR BLD AUTO: 6.5 % — SIGNIFICANT CHANGE UP (ref 1–9)
NEUTROPHILS # BLD AUTO: 7.1 K/UL — SIGNIFICANT CHANGE UP (ref 1.8–7.4)
NEUTROPHILS NFR BLD AUTO: 69.3 % — SIGNIFICANT CHANGE UP (ref 43–77)
PLATELET # BLD AUTO: 269 K/UL — SIGNIFICANT CHANGE UP (ref 150–400)
POTASSIUM SERPL-MCNC: 3.7 MMOL/L — SIGNIFICANT CHANGE UP (ref 3.5–5.3)
POTASSIUM SERPL-SCNC: 3.7 MMOL/L — SIGNIFICANT CHANGE UP (ref 3.5–5.3)
RBC # BLD: 3.48 M/UL — LOW (ref 3.8–5.2)
RBC # FLD: 12.7 % — SIGNIFICANT CHANGE UP (ref 10.3–14.5)
SODIUM SERPL-SCNC: 143 MMOL/L — SIGNIFICANT CHANGE UP (ref 135–145)
WBC # BLD: 10.2 K/UL — SIGNIFICANT CHANGE UP (ref 3.8–10.5)
WBC # FLD AUTO: 10.2 K/UL — SIGNIFICANT CHANGE UP (ref 3.8–10.5)

## 2017-09-07 PROCEDURE — 81001 URINALYSIS AUTO W/SCOPE: CPT

## 2017-09-07 PROCEDURE — 85730 THROMBOPLASTIN TIME PARTIAL: CPT

## 2017-09-07 PROCEDURE — 86140 C-REACTIVE PROTEIN: CPT

## 2017-09-07 PROCEDURE — 96365 THER/PROPH/DIAG IV INF INIT: CPT

## 2017-09-07 PROCEDURE — 85610 PROTHROMBIN TIME: CPT

## 2017-09-07 PROCEDURE — 87581 M.PNEUMON DNA AMP PROBE: CPT

## 2017-09-07 PROCEDURE — 80053 COMPREHEN METABOLIC PANEL: CPT

## 2017-09-07 PROCEDURE — 71045 X-RAY EXAM CHEST 1 VIEW: CPT

## 2017-09-07 PROCEDURE — 87486 CHLMYD PNEUM DNA AMP PROBE: CPT

## 2017-09-07 PROCEDURE — 86038 ANTINUCLEAR ANTIBODIES: CPT

## 2017-09-07 PROCEDURE — 87798 DETECT AGENT NOS DNA AMP: CPT

## 2017-09-07 PROCEDURE — 70553 MRI BRAIN STEM W/O & W/DYE: CPT

## 2017-09-07 PROCEDURE — 87086 URINE CULTURE/COLONY COUNT: CPT

## 2017-09-07 PROCEDURE — A9579: CPT

## 2017-09-07 PROCEDURE — 85652 RBC SED RATE AUTOMATED: CPT

## 2017-09-07 PROCEDURE — 85027 COMPLETE CBC AUTOMATED: CPT

## 2017-09-07 PROCEDURE — 70450 CT HEAD/BRAIN W/O DYE: CPT

## 2017-09-07 PROCEDURE — 84145 PROCALCITONIN (PCT): CPT

## 2017-09-07 PROCEDURE — 96366 THER/PROPH/DIAG IV INF ADDON: CPT

## 2017-09-07 PROCEDURE — 87633 RESP VIRUS 12-25 TARGETS: CPT

## 2017-09-07 PROCEDURE — 87040 BLOOD CULTURE FOR BACTERIA: CPT

## 2017-09-07 PROCEDURE — 83605 ASSAY OF LACTIC ACID: CPT

## 2017-09-07 PROCEDURE — 86160 COMPLEMENT ANTIGEN: CPT

## 2017-09-07 PROCEDURE — 99285 EMERGENCY DEPT VISIT HI MDM: CPT | Mod: 25

## 2017-09-07 PROCEDURE — 80048 BASIC METABOLIC PNL TOTAL CA: CPT

## 2017-09-07 PROCEDURE — 84702 CHORIONIC GONADOTROPIN TEST: CPT

## 2017-09-07 RX ORDER — ACETAMINOPHEN 500 MG
2 TABLET ORAL
Qty: 0 | Refills: 0 | DISCHARGE
Start: 2017-09-07

## 2017-09-07 RX ADMIN — Medication 137 MICROGRAM(S): at 05:25

## 2017-09-07 RX ADMIN — CEFEPIME 100 MILLIGRAM(S): 1 INJECTION, POWDER, FOR SOLUTION INTRAMUSCULAR; INTRAVENOUS at 05:25

## 2017-09-07 RX ADMIN — CEFEPIME 100 MILLIGRAM(S): 1 INJECTION, POWDER, FOR SOLUTION INTRAMUSCULAR; INTRAVENOUS at 14:03

## 2017-09-07 RX ADMIN — ENOXAPARIN SODIUM 40 MILLIGRAM(S): 100 INJECTION SUBCUTANEOUS at 11:18

## 2017-09-07 RX ADMIN — Medication 200 MILLIGRAM(S): at 11:18

## 2017-09-07 RX ADMIN — OXYCODONE AND ACETAMINOPHEN 2 TABLET(S): 5; 325 TABLET ORAL at 05:25

## 2017-09-07 RX ADMIN — OXYCODONE AND ACETAMINOPHEN 2 TABLET(S): 5; 325 TABLET ORAL at 06:49

## 2017-09-07 RX ADMIN — PANTOPRAZOLE SODIUM 40 MILLIGRAM(S): 20 TABLET, DELAYED RELEASE ORAL at 05:25

## 2017-09-07 RX ADMIN — SODIUM CHLORIDE 75 MILLILITER(S): 9 INJECTION INTRAMUSCULAR; INTRAVENOUS; SUBCUTANEOUS at 11:19

## 2017-09-07 NOTE — DISCHARGE NOTE ADULT - CARE PROVIDER_API CALL
Mary Vargas), Internal Medicine  [address] [facility] [address]  [place] [address]  Phone: [phone]  Fax: [phone]    Randolph Gonzales), Internal Medicine; Rheumatology  [address] [place] [id number]  Phone: [phone]  Fax: [phone]

## 2017-09-07 NOTE — DISCHARGE NOTE ADULT - HOSPITAL COURSE
admitted with febrile syndrome, headache, nausea and vomiting, wbc 25- seen by ID- dr Tobar, and neuro.  no source of infection, cleared by id for dc off abx.  pt to f/u rheum and pcp post dc.

## 2017-09-07 NOTE — DISCHARGE NOTE ADULT - CARE PLAN
Principal Discharge DX:	Febrile illness, acute  Goal:	resolution  Instructions for follow-up, activity and diet:	no evidence of ongoing infectious process  Secondary Diagnosis:	Headache  Instructions for follow-up, activity and diet:	follow up rheum  Secondary Diagnosis:	Mixed connective tissue disease  Instructions for follow-up, activity and diet:	as above, and cont plaquenil (hydroxychloroquine)

## 2017-09-07 NOTE — DISCHARGE NOTE ADULT - PLAN OF CARE
resolution no evidence of ongoing infectious process follow up rheum as above, and cont plaquenil (hydroxychloroquine)

## 2017-09-07 NOTE — PROGRESS NOTE ADULT - ASSESSMENT
27F admitted with fever/leukocytosis  now resolved  although no clear infectious etiology.    cultures neg and no cough, no dyspea/cough to be concerned for pna

## 2017-09-07 NOTE — PROGRESS NOTE ADULT - SUBJECTIVE AND OBJECTIVE BOX
[facility], Division of Infectious Diseases  HOMAR Weaver A. Lee  [phone]    Name: CHRISTEN RALPH  Age: 27y  Gender: Female  MRN: [id number]    Interval History--  Notes reviewed  feels well, no further vomiting  no headache    Past Medical History--  GERD (gastroesophageal reflux disease)  Hypothyroid  Mixed connective tissue disease  No significant past surgical history      For details regarding the patient's social history, family history, and other miscellaneous elements, please refer the initial infectious diseases consultation and/or the admitting history and physical examination for this admission.    Allergies    penicillin (Unknown)  sulfa drugs (Unknown)    Intolerances        Medications--  Antibiotics:  hydroxychloroquine 200 milliGRAM(s) Oral daily  vancomycin  IVPB   IV Intermittent   vancomycin  IVPB 1000 milliGRAM(s) IV Intermittent every 24 hours  cefepime  IVPB   IV Intermittent   cefepime  IVPB 1000 milliGRAM(s) IV Intermittent every 8 hours    Immunologic:    Other:  pantoprazole    Tablet  levothyroxine  enoxaparin Injectable  sodium chloride 0.9%.  acetaminophen   Tablet PRN  cyclobenzaprine PRN  morphine  - Injectable PRN  oxyCODONE    5 mG/acetaminophen 325 mG PRN  oxyCODONE    5 mG/acetaminophen 325 mG PRN  ondansetron Injectable PRN      Review of Systems--  A 10-point review of systems was obtained.     Pertinent positives and negatives--  Constitutional: No fevers. No Chills. No Rigors.   Cardiovascular: No chest pain. No palpitations.  Respiratory: No shortness of breath. No cough.  Gastrointestinal: No nausea or vomiting. No diarrhea or constipation.   Psychiatric: Pleasant. Appropriate affect.    Review of systems otherwise negative except as previously noted.    Physical Examination--  Vital Signs: T(F): 98.6 (09-07-17 @ 13:14), Max: 98.6 (09-06-17 @ 19:53)  HR: 76 (09-07-17 @ 13:14)  BP: 126/88 (09-07-17 @ 13:14)  RR: 17 (09-07-17 @ 13:14)  SpO2: 100% (09-07-17 @ 13:14)  Wt(kg): --  General: Nontoxic-appearing Female in no acute distress.  HEENT: AT/NC. PERRL. EOMI. Anicteric. Conjunctiva pink and moist. Oropharynx clear. Dentition fair.  Neck: Not rigid. No sense of mass.  Nodes: None palpable.  Lungs: Clear bilaterally without rales, wheezing or ronchi  Heart: Regular rate and rhythm. No Murmur. No rub. No gallop. No palpable thrill.  Abdomen: Bowel sounds present and normoactive. Soft. Nondistended. Nontender. No sense of mass. No organomegaly.  Back: No spinal tenderness. No costovertebral angle tenderness.   Extremities: No cyanosis or clubbing. No edema.   Skin: Warm. Dry. Good turgor. No rash. No vasculitic stigmata.  Psychiatric: Appropriate affect and mood for situation.         Laboratory Studies--  CBC                        9.6    10.2  )-----------( 269      ( 07 Sep 2017 07:37 )             29.5       Chemistries  09-07    143  |  111<H>  |  6<L>  ----------------------------<  80  3.7   |  26  |  0.58    Ca    8.3<L>      07 Sep 2017 07:37    TPro  7.9  /  Alb  3.8  /  TBili  0.5  /  DBili  x   /  AST  26  /  ALT  25  /  AlkPhos  73  09-05      Culture Data    Culture - Blood (09.05.17 @ 21:51)    Specimen Source: .Blood Blood-Peripheral    Culture Results:   No growth to date.        Culture - Urine (09.05.17 @ 21:46)    Specimen Source: .Urine Clean Catch (Midstream)    Culture Results:   No growth

## 2017-09-07 NOTE — PROGRESS NOTE ADULT - PROBLEM SELECTOR PLAN 1
- possibly due to pneumonia (xray bilateral infiltrates), lactate 1.8   - RVP negative  - Procalcitonin mildly elevated  - Continue IV vanco, IV maxipime   - ID consult (Dr. Bermudez), recommendations are appreciated  - tylenol prn for fever  - zofran prn for nausea  - percocet prn for mild and moderate pain  - morphine prn for severe pain  - flexeril prn for muscle stiffness  - ivf ns75 hr  - blood and urine cultures negative to date
with bandemia and fever  all resolved.  Clinically no acute infection all cultures negative  no localizing feature of infection  can d/c antibiotics

## 2017-09-07 NOTE — DISCHARGE NOTE ADULT - MEDICATION SUMMARY - MEDICATIONS TO TAKE
I will START or STAY ON the medications listed below when I get home from the hospital:    acetaminophen 325 mg oral tablet  -- 2 tab(s) by mouth every 6 hours, As needed, For mild pain  -- Indication: For Mild pain    Motrin  mg oral tablet  -- 1 to 2 tab(s) by mouth every 6 hours as needed for pain  -- Indication: For Mild to moderate pain    hydroxychloroquine  -- 200 milligram(s) by mouth once a day  -- Indication: For Mixed connective tissue disease    tiZANidine 4 mg oral capsule  -- 1 tab(s) by mouth 2 times a day, As Needed  -- Indication: For Mixed connective tissue disease    omeprazole 40 mg oral delayed release capsule  -- 1 cap(s) by mouth once a day  -- Indication: For GERD (gastroesophageal reflux disease)    Synthroid 137 mcg (0.137 mg) oral tablet  -- 1 tab(s) by mouth once a day  -- Indication: For Hypothyroid

## 2017-09-07 NOTE — PROGRESS NOTE ADULT - SUBJECTIVE AND OBJECTIVE BOX
Patient is a 27y old  Female who presents with a chief complaint of Headache and vomiting (05 Sep 2017 18:22)      INTERVAL HPI/OVERNIGHT EVENTS: 26 y/o F with PMH of Mixed Connective Tissue Disease (MCTD), hypothyroidism, and GERD presents to the ED with HA and vomiting x 1 day. Pt states that at 10:30 AM this morning she began having a HA, nausea, and vomiting. She notes having vomited up yellow liquid 6-7x today, denies hematemesis. Pt describes the HA as "throbbing" beginning at the base of her neck and radiating to the top of her head. She also notes having associated "throbbing" muscle pain in her neck, upper back, and backs of her legs, which are 7/10 on pain scale. Pt states that whenever she is sick she normally has increased muscle stiffness 2/2 her MCTD. She also notes having intermittent HAs last week. Pt denies sick contacts.    In the ED Pt VS /78, , RR16, T100.1F, SpO2 99% on RA. Labs significant for WBC 16.9 with increased band PMNs, lactate WNL. Rapid RVP not detected. UA neg. EKG showed sinus tach at 130bpm. CXR bibasilar infiltrates. HCT negative for acute pathology. Urine Cx and Blood Cxs collected. Pt received Zithromax and Rocephin IV x1, 2L NS bolus, tylenol x1, zofran x1. Pt was seen by neurology (Dr. Walsh) to r/o CNS infection, pt refused LP.    No acute events noted overnight. Pt receiving IV morphine and oral percocet for pain, reports significant decrease in HA and muscle stiffness. Reports mild HA with motion despite pain management. Patient requesting to go home to "recover in a quieter environment."    MEDICATIONS  (STANDING):  pantoprazole    Tablet 40 milliGRAM(s) Oral before breakfast  levothyroxine 137 MICROGram(s) Oral daily  enoxaparin Injectable 40 milliGRAM(s) SubCutaneous every 24 hours  hydroxychloroquine 200 milliGRAM(s) Oral daily  sodium chloride 0.9%. 1000 milliLiter(s) (75 mL/Hr) IV Continuous <Continuous>  vancomycin  IVPB   IV Intermittent   vancomycin  IVPB 1000 milliGRAM(s) IV Intermittent every 24 hours  cefepime  IVPB   IV Intermittent   cefepime  IVPB 1000 milliGRAM(s) IV Intermittent every 8 hours    MEDICATIONS  (PRN):  acetaminophen   Tablet 650 milliGRAM(s) Oral every 6 hours PRN For Temp greater than 38 C (100.4 F)  cyclobenzaprine 5 milliGRAM(s) Oral three times a day PRN Muscle Spasm  morphine  - Injectable 2 milliGRAM(s) IV Push every 4 hours PRN Severe Pain (7 - 10)  oxyCODONE    5 mG/acetaminophen 325 mG 1 Tablet(s) Oral every 4 hours PRN Mild Pain (1 - 3)  oxyCODONE    5 mG/acetaminophen 325 mG 2 Tablet(s) Oral every 4 hours PRN Moderate Pain (4 - 6)  ondansetron Injectable 4 milliGRAM(s) IV Push every 4 hours PRN Nausea and/or Vomiting      Allergies    penicillin (Unknown)  sulfa drugs (Unknown)    Intolerances      REVIEW OF SYSTEMS:  CONSTITUTIONAL: No fever, No chills,No fatigue,No myalgia,No Body ache  EYES: No eye pain, visual disturbances, or discharge  ENMT:  No ear pain, No nose bleed, No vertigo; No sinus or throat pain  NECK: No pain, No stiffness  RESPIRATORY: No cough, wheezing, No  hemoptysis; No shortness of breath  CARDIOVASCULAR: No chest pain, palpitations, leg swelling  GASTROINTESTINAL: No abdominal or epigastric pain. No nausea, No vomiting; No diarrhea or constipation. [ ] BM  GENITOURINARY: No dysuria, No frequency, No urgency, No hematuria, or incontinence  NEUROLOGICAL: alert and oriented x 3,  mild headaches, No dizziness, No numbness,  SKIN:   No itching, burning, rashes, or lesions   MUSCULOSKELETAL: No joint pain or swelling; No muscle pain, No back pain, No extremity pain  PSYCHIATRIC: No depression, anxiety, mood swings, or difficulty sleeping  ROS  [ ] Unable to obtain   REST OF REVIEW Of SYSTEM - [ ] Normal     Height (cm): 154.94 ( @ 14:10)  Weight (kg): 54.4 ( @ :10)  BMI (kg/m2): 22.7 ( @ :10)  BSA (m2): 1.52 ( @ 14:10)  Vital Signs Last 24 Hrs  T(C): 37 (07 Sep 2017 13:14), Max: 37 (06 Sep 2017 19:53)  T(F): 98.6 (07 Sep 2017 13:14), Max: 98.6 (06 Sep 2017 19:53)  HR: 76 (07 Sep 2017 13:14) (76 - 90)  BP: 126/88 (07 Sep 2017 13:14) (98/68 - 126/88)  BP(mean): --  RR: 17 (07 Sep 2017 13:14) (16 - 17)  SpO2: 100% (07 Sep 2017 13:14) (99% - 100%)  [ X] room air   [ ] 02    PHYSICAL EXAM:  GENERAL:  No acute distress, well appearing, [ ] Agitated, [ ] Lethargy, [ ] confused   HEAD:  normal  ENMT: normal  NECK:  normal    NERVOUS SYSTEM:  Alert & Oriented X3, no focal deficits [ ]Confusion  [ ] Encephalopathic [ ] Sedated [ ] Other  CHEST/LUNG: Clear to auscultation bilaterally,  [ ] decreased breath sounds at bases  [ ] wheezing   [ ] rhonchi  [ ] crackles  HEART:  Regular rate and rhythm, No murmurs, rubs, or gallops,  [ ] irregular   ABDOMEN:  soft, nontender, nondistended, positive bowel sounds   [ ] obese  EXTREMITIES: No clubbing, cyanosis or edema  SKIN: [ ] venous stasis skin changes    LABS:                        9.6    10.2  )-----------( 269      ( 07 Sep 2017 07:37 )             29.5     07 Sep 2017 07:37    143    |  111    |  6      ----------------------------<  80     3.7     |  26     |  0.58     Ca    8.3        07 Sep 2017 07:37      PT/INR - ( 05 Sep 2017 15:01 )   PT: 12.1 sec;   INR: 1.11 ratio         PTT - ( 05 Sep 2017 15:01 )  PTT:29.1 sec    Urinalysis Basic - ( 05 Sep 2017 17:39 )    Color: Yellow / Appearance: Clear / S.020 / pH: x  Gluc: x / Ketone: Small  / Bili: Negative / Urobili: Negative   Blood: x / Protein: 25 mg/dL / Nitrite: Negative   Leuk Esterase: Moderate / RBC: 3-5 /HPF / WBC 26-50   Sq Epi: x / Non Sq Epi: Few / Bacteria: Few      CAPILLARY BLOOD GLUCOSE        Cultures   blood and urine cultures no growth to date        Care Discussed with [X] Consultants  [x ] Patient  [ ] Family  [X]   /   [ ] Other; RN  DVT prophylaxis [x ] lovenox   [ ] subq heparin  [ ] coumadin  [ ] venodynes [ ] ambulating frequently at how risk for vte and no pharm         or  mechanical prophylaxis required    [ ] other   Advanced directive:    [ ]pt has hcp     [ ] pt declined to assign hcp  Discussed with pt @ bedside Patient is a 27y old  Female who presents with a chief complaint of Headache and vomiting (05 Sep 2017 18:22)      INTERVAL HPI/OVERNIGHT EVENTS: 28 y/o F with PMH of Mixed Connective Tissue Disease (MCTD), hypothyroidism, and GERD presents to the ED with HA and vomiting x 1 day. Pt states that at 10:30 AM this morning she began having a HA, nausea, and vomiting. She notes having vomited up yellow liquid 6-7x today, denies hematemesis. Pt describes the HA as "throbbing" beginning at the base of her neck and radiating to the top of her head. She also notes having associated "throbbing" muscle pain in her neck, upper back, and backs of her legs, which are 7/10 on pain scale. Pt states that whenever she is sick she normally has increased muscle stiffness 2/2 her MCTD. She also notes having intermittent HAs last week. Pt denies sick contacts.    In the ED Pt VS /78, , RR16, T100.1F, SpO2 99% on RA. Labs significant for WBC 16.9 with increased band PMNs, lactate WNL. Rapid RVP not detected. UA neg. EKG showed sinus tach at 130bpm. CXR bibasilar infiltrates. HCT negative for acute pathology. Urine Cx and Blood Cxs collected. Pt received Zithromax and Rocephin IV x1, 2L NS bolus, tylenol x1, zofran x1. Pt was seen by neurology (Dr. Walsh) to r/o CNS infection, pt refused LP.    No acute events noted overnight. Pt receiving IV morphine and oral percocet for pain, reports significant decrease in HA and muscle stiffness. Reports mild HA with motion despite pain management. Patient requesting to go home to "recover in a quieter environment."    MEDICATIONS  (STANDING):  pantoprazole    Tablet 40 milliGRAM(s) Oral before breakfast  levothyroxine 137 MICROGram(s) Oral daily  enoxaparin Injectable 40 milliGRAM(s) SubCutaneous every 24 hours  hydroxychloroquine 200 milliGRAM(s) Oral daily  sodium chloride 0.9%. 1000 milliLiter(s) (75 mL/Hr) IV Continuous <Continuous>  vancomycin  IVPB   IV Intermittent   vancomycin  IVPB 1000 milliGRAM(s) IV Intermittent every 24 hours  cefepime  IVPB   IV Intermittent   cefepime  IVPB 1000 milliGRAM(s) IV Intermittent every 8 hours    MEDICATIONS  (PRN):  acetaminophen   Tablet 650 milliGRAM(s) Oral every 6 hours PRN For Temp greater than 38 C (100.4 F)  cyclobenzaprine 5 milliGRAM(s) Oral three times a day PRN Muscle Spasm  morphine  - Injectable 2 milliGRAM(s) IV Push every 4 hours PRN Severe Pain (7 - 10)  oxyCODONE    5 mG/acetaminophen 325 mG 1 Tablet(s) Oral every 4 hours PRN Mild Pain (1 - 3)  oxyCODONE    5 mG/acetaminophen 325 mG 2 Tablet(s) Oral every 4 hours PRN Moderate Pain (4 - 6)  ondansetron Injectable 4 milliGRAM(s) IV Push every 4 hours PRN Nausea and/or Vomiting      Allergies    penicillin (Unknown)  sulfa drugs (Unknown)    Intolerances      REVIEW OF SYSTEMS:  CONSTITUTIONAL: No fever, No chills,No fatigue,No myalgia,No Body ache  EYES: No eye pain, visual disturbances, or discharge  ENMT:  No ear pain, No nose bleed, No vertigo; No sinus or throat pain  NECK: No pain, No stiffness  RESPIRATORY: No cough, wheezing, No  hemoptysis; No shortness of breath  CARDIOVASCULAR: No chest pain, palpitations, leg swelling  GASTROINTESTINAL: No abdominal or epigastric pain. No nausea, No vomiting; No diarrhea or constipation. [ ] BM  GENITOURINARY: No dysuria, No frequency, No urgency, No hematuria, or incontinence  NEUROLOGICAL: alert and oriented x 3,  mild headaches, No dizziness, No numbness,  SKIN:   No itching, burning, rashes, or lesions   MUSCULOSKELETAL: No joint pain or swelling; No muscle pain, No back pain, No extremity pain  PSYCHIATRIC: No depression, anxiety, mood swings, or difficulty sleeping  ROS  [ ] Unable to obtain   REST OF REVIEW Of SYSTEM - [ ] Normal     Height (cm): 154.94 ( @ 14:10)  Weight (kg): 54.4 ( @ :10)  BMI (kg/m2): 22.7 ( @ :10)  BSA (m2): 1.52 ( @ 14:10)  Vital Signs Last 24 Hrs  T(C): 37 (07 Sep 2017 13:14), Max: 37 (06 Sep 2017 19:53)  T(F): 98.6 (07 Sep 2017 13:14), Max: 98.6 (06 Sep 2017 19:53)  HR: 76 (07 Sep 2017 13:14) (76 - 90)  BP: 126/88 (07 Sep 2017 13:14) (98/68 - 126/88)  BP(mean): --  RR: 17 (07 Sep 2017 13:14) (16 - 17)  SpO2: 100% (07 Sep 2017 13:14) (99% - 100%)  [ X] room air   [ ] 02    PHYSICAL EXAM:  GENERAL:  No acute distress, well appearing, [ ] Agitated, [ ] Lethargy, [ ] confused   HEAD:  normal  ENMT: normal  NECK:  normal    NERVOUS SYSTEM:  Alert & Oriented X3, no focal deficits [ ]Confusion  [ ] Encephalopathic [ ] Sedated [ ] Other  CHEST/LUNG: Clear to auscultation bilaterally,  [ ] decreased breath sounds at bases  [ ] wheezing   [ ] rhonchi  [ ] crackles  HEART:  Regular rate and rhythm, No murmurs, rubs, or gallops,  [ ] irregular   ABDOMEN:  soft, nontender, nondistended, positive bowel sounds   [ ] obese  EXTREMITIES: No clubbing, cyanosis or edema  SKIN: [ ] venous stasis skin changes    LABS:                        9.6    10.2  )-----------( 269      ( 07 Sep 2017 07:37 )             29.5     07 Sep 2017 07:37    143    |  111    |  6      ----------------------------<  80     3.7     |  26     |  0.58     Ca    8.3        07 Sep 2017 07:37      PT/INR - ( 05 Sep 2017 15:01 )   PT: 12.1 sec;   INR: 1.11 ratio         PTT - ( 05 Sep 2017 15:01 )  PTT:29.1 sec    Urinalysis Basic - ( 05 Sep 2017 17:39 )    Color: Yellow / Appearance: Clear / S.020 / pH: x  Gluc: x / Ketone: Small  / Bili: Negative / Urobili: Negative   Blood: x / Protein: 25 mg/dL / Nitrite: Negative   Leuk Esterase: Moderate / RBC: 3-5 /HPF / WBC 26-50   Sq Epi: x / Non Sq Epi: Few / Bacteria: Few      CAPILLARY BLOOD GLUCOSE        Cultures   blood and urine cultures no growth to date    RADIOLOGICAL TESTS  Brain MRI: Normal brain        Care Discussed with [X] Consultants  [x ] Patient  [ ] Family  [X]   /   [ ] Other; RN  DVT prophylaxis [x ] lovenox   [ ] subq heparin  [ ] coumadin  [ ] venodynes [ ] ambulating frequently at how risk for vte and no pharm         or  mechanical prophylaxis required    [ ] other   Advanced directive:    [ ]pt has hcp     [ ] pt declined to assign hcp  Discussed with pt @ bedside

## 2017-09-07 NOTE — DISCHARGE NOTE ADULT - PATIENT PORTAL LINK FT
“You can access the [facility] Patient Portal, offered by [facility], by registering with the following website: [email or web address]”

## 2017-09-07 NOTE — PROGRESS NOTE ADULT - PROBLEM SELECTOR PLAN 2
- no neck stiffness, negative Brudzinski's and Kerning's signs  - Neuro (Dr. Walsh) consulted, pt refused LP  - follow up PT evaluation per neuro  -OOB with assistance only - no neck stiffness, negative Brudzinski's and Kerning's signs  - Neuro (Dr. Walsh) consulted, pt refused LP  - follow up PT evaluation per neuro  -OOB with assistance only  -MRI negative

## 2017-09-10 LAB
CULTURE RESULTS: SIGNIFICANT CHANGE UP
CULTURE RESULTS: SIGNIFICANT CHANGE UP
SPECIMEN SOURCE: SIGNIFICANT CHANGE UP
SPECIMEN SOURCE: SIGNIFICANT CHANGE UP

## 2017-09-11 ENCOUNTER — LABORATORY RESULT (OUTPATIENT)
Age: 27
End: 2017-09-11

## 2017-09-11 ENCOUNTER — APPOINTMENT (OUTPATIENT)
Dept: RHEUMATOLOGY | Facility: CLINIC | Age: 27
End: 2017-09-11

## 2017-09-11 ENCOUNTER — APPOINTMENT (OUTPATIENT)
Dept: RHEUMATOLOGY | Facility: CLINIC | Age: 27
End: 2017-09-11
Payer: COMMERCIAL

## 2017-09-11 VITALS
SYSTOLIC BLOOD PRESSURE: 132 MMHG | WEIGHT: 118 LBS | BODY MASS INDEX: 22.28 KG/M2 | HEART RATE: 109 BPM | HEIGHT: 61 IN | TEMPERATURE: 97.7 F | DIASTOLIC BLOOD PRESSURE: 88 MMHG

## 2017-09-11 DIAGNOSIS — E03.9 HYPOTHYROIDISM, UNSPECIFIED: ICD-10-CM

## 2017-09-11 DIAGNOSIS — Z88.2 ALLERGY STATUS TO SULFONAMIDES: ICD-10-CM

## 2017-09-11 DIAGNOSIS — R11.2 NAUSEA WITH VOMITING, UNSPECIFIED: ICD-10-CM

## 2017-09-11 DIAGNOSIS — M35.1 OTHER OVERLAP SYNDROMES: ICD-10-CM

## 2017-09-11 DIAGNOSIS — R50.9 FEVER, UNSPECIFIED: ICD-10-CM

## 2017-09-11 DIAGNOSIS — K21.9 GASTRO-ESOPHAGEAL REFLUX DISEASE WITHOUT ESOPHAGITIS: ICD-10-CM

## 2017-09-11 DIAGNOSIS — R51 HEADACHE: ICD-10-CM

## 2017-09-11 DIAGNOSIS — D72.825 BANDEMIA: ICD-10-CM

## 2017-09-11 DIAGNOSIS — Z53.29 PROCEDURE AND TREATMENT NOT CARRIED OUT BECAUSE OF PATIENT'S DECISION FOR OTHER REASONS: ICD-10-CM

## 2017-09-11 DIAGNOSIS — Z88.0 ALLERGY STATUS TO PENICILLIN: ICD-10-CM

## 2017-09-11 PROBLEM — Z00.00 ENCOUNTER FOR PREVENTIVE HEALTH EXAMINATION: Noted: 2017-09-11

## 2017-09-11 LAB
BASOPHILS # BLD AUTO: 0.02 K/UL
BASOPHILS NFR BLD AUTO: 0.4 %
CULTURE RESULTS: SIGNIFICANT CHANGE UP
CULTURE RESULTS: SIGNIFICANT CHANGE UP
EOSINOPHIL # BLD AUTO: 0.08 K/UL
EOSINOPHIL NFR BLD AUTO: 1.5 %
HCT VFR BLD CALC: 36.7 %
HGB BLD-MCNC: 11.7 G/DL
IMM GRANULOCYTES NFR BLD AUTO: 0.4 %
LYMPHOCYTES # BLD AUTO: 1.24 K/UL
LYMPHOCYTES NFR BLD AUTO: 23 %
MAN DIFF?: NORMAL
MCHC RBC-ENTMCNC: 27.2 PG
MCHC RBC-ENTMCNC: 31.9 GM/DL
MCV RBC AUTO: 85.3 FL
MONOCYTES # BLD AUTO: 0.62 K/UL
MONOCYTES NFR BLD AUTO: 11.5 %
NEUTROPHILS # BLD AUTO: 3.42 K/UL
NEUTROPHILS NFR BLD AUTO: 63.2 %
PLATELET # BLD AUTO: 376 K/UL
RBC # BLD: 4.3 M/UL
RBC # FLD: 14.1 %
SPECIMEN SOURCE: SIGNIFICANT CHANGE UP
SPECIMEN SOURCE: SIGNIFICANT CHANGE UP
WBC # FLD AUTO: 5.4 K/UL

## 2017-09-11 PROCEDURE — 99215 OFFICE O/P EST HI 40 MIN: CPT

## 2017-09-12 LAB
25(OH)D3 SERPL-MCNC: 40.9 NG/ML
ALBUMIN SERPL ELPH-MCNC: 4.4 G/DL
ALP BLD-CCNC: 61 U/L
ALT SERPL-CCNC: 18 U/L
ANION GAP SERPL CALC-SCNC: 17 MMOL/L
ASO AB SER LA-ACNC: 30 IU/ML
AST SERPL-CCNC: 21 U/L
B BURGDOR AB SER-IMP: NEGATIVE
B BURGDOR IGG+IGM SER QL: 0.18 INDEX
BILIRUB SERPL-MCNC: 0.2 MG/DL
BUN SERPL-MCNC: 5 MG/DL
CALCIUM SERPL-MCNC: 9.7 MG/DL
CHLORIDE SERPL-SCNC: 102 MMOL/L
CK SERPL-CCNC: 96 U/L
CO2 SERPL-SCNC: 23 MMOL/L
CREAT SERPL-MCNC: 0.56 MG/DL
CRP SERPL-MCNC: 0.6 MG/DL
GLUCOSE SERPL-MCNC: 96 MG/DL
IRON SATN MFR SERPL: 16 %
IRON SERPL-MCNC: 44 UG/DL
MPO AB + PR3 PNL SER: NORMAL
POTASSIUM SERPL-SCNC: 4 MMOL/L
PROT SERPL-MCNC: 8.2 G/DL
SODIUM SERPL-SCNC: 142 MMOL/L
TIBC SERPL-MCNC: 283 UG/DL
TSH SERPL-ACNC: 2.34 UIU/ML
UIBC SERPL-MCNC: 239 UG/DL

## 2017-09-13 LAB
B19V IGG SER QL IA: 3 INDEX
B19V IGG+IGM SER-IMP: NORMAL
B19V IGG+IGM SER-IMP: POSITIVE
B19V IGM FLD-ACNC: 0.2 INDEX
B19V IGM SER-ACNC: NEGATIVE

## 2017-09-14 LAB — RNA POLYMERASE III IGG: <10 U

## 2017-09-15 LAB
ENA RNP AB SER IA-ACNC: >8 AL
ENA SCL70 IGG SER IA-ACNC: <0.2 AL
ENA SM AB SER IA-ACNC: 2.8 AL

## 2017-10-23 RX ORDER — HYDROXYCHLOROQUINE SULFATE 200 MG
0 TABLET ORAL
Qty: 0 | Refills: 0 | COMMUNITY

## 2017-10-23 RX ORDER — HYDROXYCHLOROQUINE SULFATE 200 MG
125 TABLET ORAL
Qty: 0 | Refills: 0 | COMMUNITY

## 2018-03-12 ENCOUNTER — RX RENEWAL (OUTPATIENT)
Age: 28
End: 2018-03-12

## 2018-05-17 ENCOUNTER — RX RENEWAL (OUTPATIENT)
Age: 28
End: 2018-05-17

## 2018-06-15 ENCOUNTER — MEDICATION RENEWAL (OUTPATIENT)
Age: 28
End: 2018-06-15

## 2018-06-15 PROBLEM — E03.9 HYPOTHYROIDISM, UNSPECIFIED: Chronic | Status: ACTIVE | Noted: 2017-09-05

## 2018-06-15 PROBLEM — K21.9 GASTRO-ESOPHAGEAL REFLUX DISEASE WITHOUT ESOPHAGITIS: Chronic | Status: ACTIVE | Noted: 2017-09-05

## 2018-06-15 PROBLEM — M35.1 OTHER OVERLAP SYNDROMES: Chronic | Status: ACTIVE | Noted: 2017-09-05

## 2018-07-01 ENCOUNTER — RX RENEWAL (OUTPATIENT)
Age: 28
End: 2018-07-01

## 2018-07-09 ENCOUNTER — APPOINTMENT (OUTPATIENT)
Dept: RHEUMATOLOGY | Facility: CLINIC | Age: 28
End: 2018-07-09
Payer: COMMERCIAL

## 2018-07-09 VITALS
DIASTOLIC BLOOD PRESSURE: 82 MMHG | HEART RATE: 142 BPM | TEMPERATURE: 98 F | HEIGHT: 61 IN | WEIGHT: 120 LBS | SYSTOLIC BLOOD PRESSURE: 143 MMHG | BODY MASS INDEX: 22.66 KG/M2

## 2018-07-09 LAB
BASOPHILS # BLD AUTO: 0.04 K/UL
BASOPHILS NFR BLD AUTO: 0.5 %
EOSINOPHIL # BLD AUTO: 0.01 K/UL
EOSINOPHIL NFR BLD AUTO: 0.1 %
HCT VFR BLD CALC: 37.7 %
HGB BLD-MCNC: 12.6 G/DL
IMM GRANULOCYTES NFR BLD AUTO: 0.3 %
LYMPHOCYTES # BLD AUTO: 1.63 K/UL
LYMPHOCYTES NFR BLD AUTO: 21.4 %
MAN DIFF?: NORMAL
MCHC RBC-ENTMCNC: 27.2 PG
MCHC RBC-ENTMCNC: 33.4 GM/DL
MCV RBC AUTO: 81.4 FL
MONOCYTES # BLD AUTO: 0.34 K/UL
MONOCYTES NFR BLD AUTO: 4.5 %
NEUTROPHILS # BLD AUTO: 5.57 K/UL
NEUTROPHILS NFR BLD AUTO: 73.2 %
PLATELET # BLD AUTO: 168 K/UL
RBC # BLD: 4.63 M/UL
RBC # FLD: 14.1 %
WBC # FLD AUTO: 7.61 K/UL

## 2018-07-09 PROCEDURE — 99215 OFFICE O/P EST HI 40 MIN: CPT | Mod: 25

## 2018-07-09 PROCEDURE — 93000 ELECTROCARDIOGRAM COMPLETE: CPT

## 2018-07-10 LAB
25(OH)D3 SERPL-MCNC: 32.1 NG/ML
ALBUMIN SERPL ELPH-MCNC: 3.8 G/DL
ALP BLD-CCNC: 106 U/L
ALT SERPL-CCNC: 107 U/L
ANION GAP SERPL CALC-SCNC: 17 MMOL/L
APPEARANCE: CLEAR
AST SERPL-CCNC: 109 U/L
BACTERIA: NEGATIVE
BILIRUB SERPL-MCNC: 0.3 MG/DL
BILIRUBIN URINE: NEGATIVE
BLOOD URINE: NEGATIVE
BUN SERPL-MCNC: 10 MG/DL
CALCIUM SERPL-MCNC: 8.8 MG/DL
CHLORIDE SERPL-SCNC: 97 MMOL/L
CK SERPL-CCNC: 116 U/L
CO2 SERPL-SCNC: 25 MMOL/L
COLOR: YELLOW
CORTIS SERPL-MCNC: 13.6 UG/DL
CREAT SERPL-MCNC: 0.68 MG/DL
CREAT SPEC-SCNC: 24 MG/DL
CREAT/PROT UR: 0.3 RATIO
GLUCOSE QUALITATIVE U: NEGATIVE MG/DL
GLUCOSE SERPL-MCNC: 81 MG/DL
KETONES URINE: NEGATIVE
LEUKOCYTE ESTERASE URINE: NEGATIVE
MICROSCOPIC-UA: NORMAL
NITRITE URINE: NEGATIVE
PH URINE: 6
POTASSIUM SERPL-SCNC: 3.5 MMOL/L
PROT SERPL-MCNC: 7 G/DL
PROT UR-MCNC: 8 MG/DL
PROTEIN URINE: NEGATIVE MG/DL
RED BLOOD CELLS URINE: 8 /HPF
SODIUM SERPL-SCNC: 139 MMOL/L
SPECIFIC GRAVITY URINE: 1.01
SQUAMOUS EPITHELIAL CELLS: 1 /HPF
TSH SERPL-ACNC: 3.31 UIU/ML
UROBILINOGEN URINE: NEGATIVE MG/DL
WHITE BLOOD CELLS URINE: 1 /HPF

## 2018-07-11 LAB
CENTROMERE IGG SER-ACNC: <0.2 AL
ENA RNP AB SER IA-ACNC: 7.8 AL
ENA SCL70 IGG SER IA-ACNC: 0.2 AL
ENA SM AB SER IA-ACNC: 2.1 AL
ENA SS-A AB SER IA-ACNC: <0.2 AL
ENA SS-B AB SER IA-ACNC: <0.2 AL

## 2018-07-12 LAB
DSDNA AB SER-ACNC: <12 IU/ML
RNA POLYMERASE III IGG: <10 U

## 2018-08-12 LAB
ALBUMIN SERPL ELPH-MCNC: 5.1 G/DL
ALP BLD-CCNC: 89 U/L
ALT SERPL-CCNC: 18 U/L
AST SERPL-CCNC: 29 U/L
BILIRUB DIRECT SERPL-MCNC: 0.1 MG/DL
BILIRUB INDIRECT SERPL-MCNC: 0.3 MG/DL
BILIRUB SERPL-MCNC: 0.4 MG/DL
PROT SERPL-MCNC: 8.7 G/DL

## 2018-11-20 ENCOUNTER — MED ADMIN CHARGE (OUTPATIENT)
Age: 28
End: 2018-11-20

## 2018-11-20 ENCOUNTER — APPOINTMENT (OUTPATIENT)
Dept: INTERNAL MEDICINE | Facility: CLINIC | Age: 28
End: 2018-11-20
Payer: COMMERCIAL

## 2018-11-20 VITALS
DIASTOLIC BLOOD PRESSURE: 78 MMHG | HEIGHT: 62 IN | TEMPERATURE: 97.1 F | WEIGHT: 120 LBS | SYSTOLIC BLOOD PRESSURE: 122 MMHG | BODY MASS INDEX: 22.08 KG/M2

## 2018-11-20 DIAGNOSIS — M62.838 OTHER MUSCLE SPASM: ICD-10-CM

## 2018-11-20 PROCEDURE — 90715 TDAP VACCINE 7 YRS/> IM: CPT

## 2018-11-20 PROCEDURE — 36415 COLL VENOUS BLD VENIPUNCTURE: CPT

## 2018-11-20 PROCEDURE — 90471 IMMUNIZATION ADMIN: CPT

## 2018-11-20 PROCEDURE — 99395 PREV VISIT EST AGE 18-39: CPT | Mod: 25

## 2018-11-20 NOTE — HISTORY OF PRESENT ILLNESS
[FreeTextEntry1] : Complete physical exam and fasting blood work [de-identified] : Patient presents to the office today for complete physical exam and fasting blood work\par She has been feeling well overall\par She has improved her diet and exercise and has noticed most of her joint pains have improved\par However she is still having chronic episodes of Raynauds \par  [FreeTextEntry8] : mmr vaccine and ppd

## 2018-11-20 NOTE — PHYSICAL EXAM

## 2018-11-20 NOTE — PHYSICAL EXAM

## 2018-11-20 NOTE — HISTORY OF PRESENT ILLNESS
[FreeTextEntry1] : Complete physical exam and fasting blood work [de-identified] : Patient presents to the office today for complete physical exam and fasting blood work\par She has been feeling well overall\par She has improved her diet and exercise and has noticed most of her joint pains have improved\par However she is still having chronic episodes of Raynauds \par  [FreeTextEntry8] : mmr vaccine and ppd

## 2018-11-20 NOTE — PLAN
[FreeTextEntry1] : Fasting blood work done on patient today in office\par Adacel vaccination given today\par Patient will start Norvasc 2.5 mg for Raynaud's phenomenon and follow up in one month to see if medication is helping if not medication will be changed\par She will hold on taking omeprazole and start ranitidine 150 mg b.i.d. p.r.n.\par Paperwork will be completed once titers and TB testing is done

## 2018-11-28 LAB
25(OH)D3 SERPL-MCNC: 34.3 NG/ML
ALBUMIN SERPL ELPH-MCNC: 4.8 G/DL
ALP BLD-CCNC: 85 U/L
ALT SERPL-CCNC: 22 U/L
ANION GAP SERPL CALC-SCNC: 13 MMOL/L
APPEARANCE: CLEAR
AST SERPL-CCNC: 33 U/L
BACTERIA: NEGATIVE
BASOPHILS # BLD AUTO: 0.02 K/UL
BASOPHILS NFR BLD AUTO: 0.4 %
BILIRUB SERPL-MCNC: 0.3 MG/DL
BILIRUBIN URINE: NEGATIVE
BLOOD URINE: NEGATIVE
BUN SERPL-MCNC: 10 MG/DL
CALCIUM SERPL-MCNC: 9.8 MG/DL
CHLORIDE SERPL-SCNC: 101 MMOL/L
CHOLEST SERPL-MCNC: 139 MG/DL
CHOLEST/HDLC SERPL: 3 RATIO
CO2 SERPL-SCNC: 25 MMOL/L
COLOR: YELLOW
CREAT SERPL-MCNC: 0.61 MG/DL
EOSINOPHIL # BLD AUTO: 0.12 K/UL
EOSINOPHIL NFR BLD AUTO: 2.4 %
GLUCOSE QUALITATIVE U: NEGATIVE MG/DL
GLUCOSE SERPL-MCNC: 81 MG/DL
HBA1C MFR BLD HPLC: 5 %
HBV SURFACE AB SER QL: ABNORMAL
HCT VFR BLD CALC: 40 %
HDLC SERPL-MCNC: 47 MG/DL
HGB BLD-MCNC: 13.2 G/DL
HYALINE CASTS: 0 /LPF
IMM GRANULOCYTES NFR BLD AUTO: 0.2 %
KETONES URINE: NEGATIVE
LDLC SERPL CALC-MCNC: 80 MG/DL
LEUKOCYTE ESTERASE URINE: NEGATIVE
LYMPHOCYTES # BLD AUTO: 1.87 K/UL
LYMPHOCYTES NFR BLD AUTO: 36.7 %
M TB IFN-G BLD-IMP: NEGATIVE
MAN DIFF?: NORMAL
MCHC RBC-ENTMCNC: 28 PG
MCHC RBC-ENTMCNC: 33 GM/DL
MCV RBC AUTO: 84.7 FL
MEV IGG FLD QL IA: 103 AU/ML
MEV IGG+IGM SER-IMP: POSITIVE
MICROSCOPIC-UA: NORMAL
MONOCYTES # BLD AUTO: 0.5 K/UL
MONOCYTES NFR BLD AUTO: 9.8 %
MUV AB SER-ACNC: NEGATIVE
MUV IGG SER QL IA: 5.8 AU/ML
NEUTROPHILS # BLD AUTO: 2.57 K/UL
NEUTROPHILS NFR BLD AUTO: 50.5 %
NITRITE URINE: NEGATIVE
PH URINE: 6.5
PLATELET # BLD AUTO: 337 K/UL
POTASSIUM SERPL-SCNC: 4.5 MMOL/L
PROT SERPL-MCNC: 8.7 G/DL
PROTEIN URINE: NEGATIVE MG/DL
QUANTIFERON TB PLUS MITOGEN MINUS NIL: 7.47 IU/ML
QUANTIFERON TB PLUS NIL: 0.02 IU/ML
QUANTIFERON TB PLUS TB1 MINUS NIL: 0 IU/ML
QUANTIFERON TB PLUS TB2 MINUS NIL: 0 IU/ML
RBC # BLD: 4.72 M/UL
RBC # FLD: 13.3 %
RED BLOOD CELLS URINE: 2 /HPF
RUBV IGG FLD-ACNC: 0.8 INDEX
RUBV IGG SER-IMP: NEGATIVE
SODIUM SERPL-SCNC: 139 MMOL/L
SPECIFIC GRAVITY URINE: 1.01
SQUAMOUS EPITHELIAL CELLS: 1 /HPF
TRIGL SERPL-MCNC: 62 MG/DL
TSH SERPL-ACNC: 0.8 UIU/ML
UROBILINOGEN URINE: NEGATIVE MG/DL
VIT B12 SERPL-MCNC: 723 PG/ML
WBC # FLD AUTO: 5.09 K/UL
WHITE BLOOD CELLS URINE: 0 /HPF

## 2018-11-29 ENCOUNTER — RECORD ABSTRACTING (OUTPATIENT)
Age: 28
End: 2018-11-29

## 2018-11-30 ENCOUNTER — RESULT REVIEW (OUTPATIENT)
Age: 28
End: 2018-11-30

## 2018-12-14 ENCOUNTER — APPOINTMENT (OUTPATIENT)
Dept: INTERNAL MEDICINE | Facility: CLINIC | Age: 28
End: 2018-12-14

## 2018-12-14 ENCOUNTER — APPOINTMENT (OUTPATIENT)
Dept: INTERNAL MEDICINE | Facility: CLINIC | Age: 28
End: 2018-12-14
Payer: COMMERCIAL

## 2018-12-14 ENCOUNTER — MED ADMIN CHARGE (OUTPATIENT)
Age: 28
End: 2018-12-14

## 2018-12-14 PROCEDURE — 90471 IMMUNIZATION ADMIN: CPT

## 2018-12-14 PROCEDURE — 90746 HEPB VACCINE 3 DOSE ADULT IM: CPT

## 2018-12-26 ENCOUNTER — APPOINTMENT (OUTPATIENT)
Dept: INTERNAL MEDICINE | Facility: CLINIC | Age: 28
End: 2018-12-26
Payer: COMMERCIAL

## 2018-12-26 VITALS
SYSTOLIC BLOOD PRESSURE: 120 MMHG | WEIGHT: 120 LBS | DIASTOLIC BLOOD PRESSURE: 70 MMHG | BODY MASS INDEX: 22.08 KG/M2 | HEIGHT: 62 IN

## 2018-12-26 DIAGNOSIS — Z28.9 IMMUNIZATION NOT CARRIED OUT FOR UNSPECIFIED REASON: ICD-10-CM

## 2018-12-26 DIAGNOSIS — Z71.89 OTHER SPECIFIED COUNSELING: ICD-10-CM

## 2018-12-26 DIAGNOSIS — Z92.29 PERSONAL HISTORY OF OTHER DRUG THERAPY: ICD-10-CM

## 2018-12-26 PROCEDURE — 99204 OFFICE O/P NEW MOD 45 MIN: CPT | Mod: 25

## 2018-12-26 PROCEDURE — 36415 COLL VENOUS BLD VENIPUNCTURE: CPT

## 2018-12-27 LAB — HBV SURFACE AB SERPL IA-ACNC: 79.6 MIU/ML

## 2019-02-06 ENCOUNTER — RX CHANGE (OUTPATIENT)
Age: 29
End: 2019-02-06

## 2019-03-03 NOTE — PATIENT PROFILE ADULT. - ABILITY TO HEAR (WITH HEARING AID OR HEARING APPLIANCE IF NORMALLY USED):
Adequate: hears normal conversation without difficulty I have personally seen and examined this patient.  I have fully participated in the care of this patient. I have reviewed all pertinent clinical information, including history, physical exam, plan and the Resident’s note and agree except as noted.

## 2019-03-04 ENCOUNTER — RX RENEWAL (OUTPATIENT)
Age: 29
End: 2019-03-04

## 2019-04-22 ENCOUNTER — APPOINTMENT (OUTPATIENT)
Dept: INTERNAL MEDICINE | Facility: CLINIC | Age: 29
End: 2019-04-22
Payer: COMMERCIAL

## 2019-04-22 ENCOUNTER — RX CHANGE (OUTPATIENT)
Age: 29
End: 2019-04-22

## 2019-04-22 ENCOUNTER — RX RENEWAL (OUTPATIENT)
Age: 29
End: 2019-04-22

## 2019-04-22 VITALS
WEIGHT: 120 LBS | TEMPERATURE: 97.8 F | HEIGHT: 62 IN | SYSTOLIC BLOOD PRESSURE: 120 MMHG | BODY MASS INDEX: 22.08 KG/M2 | DIASTOLIC BLOOD PRESSURE: 80 MMHG

## 2019-04-22 DIAGNOSIS — R09.82 POSTNASAL DRIP: ICD-10-CM

## 2019-04-22 PROCEDURE — 99214 OFFICE O/P EST MOD 30 MIN: CPT

## 2019-04-22 RX ORDER — NIFEDIPINE 30 MG/1
30 TABLET, EXTENDED RELEASE ORAL DAILY
Qty: 90 | Refills: 0 | Status: COMPLETED | COMMUNITY
Start: 2019-04-22 | End: 2019-04-22

## 2019-04-22 RX ORDER — FLUOROURACIL 50 MG/G
5 CREAM TOPICAL
Qty: 40 | Refills: 0 | Status: COMPLETED | COMMUNITY
Start: 2019-03-15

## 2019-04-22 NOTE — PLAN
[FreeTextEntry1] : Pt will see vascular for further consult on Raynauds \par She will start Zoloft given above for anxiety.  Discussed medication in depth with pt. She will follow up with me in 1 month to let me know how she is feeling or before then if worsens or any concerns\par She will continue OTC flonase and start Astelin as well for rhinitis and PND

## 2019-04-22 NOTE — REVIEW OF SYSTEMS
[Nasal Discharge] : nasal discharge [Postnasal Drip] : postnasal drip [Suicidal] : not suicidal [Insomnia] : no insomnia [Anxiety] : anxiety [Depression] : no depression [Negative] : Neurological [de-identified] : scaly skin distal fingers

## 2019-04-22 NOTE — PHYSICAL EXAM
[No Acute Distress] : no acute distress [Well Nourished] : well nourished [Normal Sclera/Conjunctiva] : normal sclera/conjunctiva [PERRL] : pupils equal round and reactive to light [Normal Outer Ear/Nose] : the outer ears and nose were normal in appearance [Normal Oropharynx] : the oropharynx was normal [Normal TMs] : both tympanic membranes were normal [No Respiratory Distress] : no respiratory distress  [Clear to Auscultation] : lungs were clear to auscultation bilaterally [No Accessory Muscle Use] : no accessory muscle use [Normal Rate] : normal rate  [Regular Rhythm] : with a regular rhythm [Normal S1, S2] : normal S1 and S2 [Normal Affect] : the affect was normal [Normal Insight/Judgement] : insight and judgment were intact [de-identified] : distal digitis of both hands with scaly and dryness.  No cyanosis  [de-identified] : PND present.  Nasal mucosa erythema and swelling

## 2019-04-22 NOTE — HISTORY OF PRESENT ILLNESS
[FreeTextEntry1] : 28 y/o female present for a f/u visit.  [de-identified] : Pt presents to the office today for follow up\par She was doing well with Raynaud's on amlodipine for several months then stopped working.  only affecting distal digits of fingers.\par She has been suffering with anxiety since a child and has been in therapy for over 10 years.   She has just been getting more anxious lately and would like to start medication to further help her along with therapy.\par She has been with PND and nasal congestion and D/C for the last few months uses OTC flonase with some improvement.

## 2019-04-23 ENCOUNTER — TRANSCRIPTION ENCOUNTER (OUTPATIENT)
Age: 29
End: 2019-04-23

## 2019-05-01 ENCOUNTER — RX RENEWAL (OUTPATIENT)
Age: 29
End: 2019-05-01

## 2019-05-15 ENCOUNTER — RX CHANGE (OUTPATIENT)
Age: 29
End: 2019-05-15

## 2019-05-30 ENCOUNTER — RX RENEWAL (OUTPATIENT)
Age: 29
End: 2019-05-30

## 2019-06-03 ENCOUNTER — RX RENEWAL (OUTPATIENT)
Age: 29
End: 2019-06-03

## 2019-06-24 ENCOUNTER — RX RENEWAL (OUTPATIENT)
Age: 29
End: 2019-06-24

## 2019-07-15 ENCOUNTER — RX RENEWAL (OUTPATIENT)
Age: 29
End: 2019-07-15

## 2019-07-22 ENCOUNTER — APPOINTMENT (OUTPATIENT)
Dept: RHEUMATOLOGY | Facility: CLINIC | Age: 29
End: 2019-07-22
Payer: COMMERCIAL

## 2019-07-22 VITALS
TEMPERATURE: 98.2 F | HEART RATE: 105 BPM | WEIGHT: 114 LBS | OXYGEN SATURATION: 74 % | DIASTOLIC BLOOD PRESSURE: 86 MMHG | BODY MASS INDEX: 20.98 KG/M2 | HEIGHT: 62 IN | SYSTOLIC BLOOD PRESSURE: 132 MMHG

## 2019-07-22 PROCEDURE — 99215 OFFICE O/P EST HI 40 MIN: CPT | Mod: 25

## 2019-07-22 PROCEDURE — 90732 PPSV23 VACC 2 YRS+ SUBQ/IM: CPT

## 2019-07-22 PROCEDURE — G0009: CPT

## 2019-07-22 RX ORDER — FELODIPINE 5 MG/1
5 TABLET, EXTENDED RELEASE ORAL DAILY
Qty: 90 | Refills: 1 | Status: DISCONTINUED | COMMUNITY
Start: 2019-04-22 | End: 2019-07-22

## 2019-07-22 RX ORDER — AMLODIPINE BESYLATE 2.5 MG/1
2.5 TABLET ORAL
Qty: 90 | Refills: 0 | Status: DISCONTINUED | COMMUNITY
Start: 2018-11-20 | End: 2019-07-22

## 2019-07-23 LAB
ALBUMIN SERPL ELPH-MCNC: 4.9 G/DL
ALP BLD-CCNC: 75 U/L
ALT SERPL-CCNC: 17 U/L
ANION GAP SERPL CALC-SCNC: 12 MMOL/L
APPEARANCE: CLEAR
AST SERPL-CCNC: 32 U/L
BACTERIA: NEGATIVE
BASOPHILS # BLD AUTO: 0.04 K/UL
BASOPHILS NFR BLD AUTO: 0.6 %
BILIRUB SERPL-MCNC: 0.2 MG/DL
BILIRUBIN URINE: NEGATIVE
BLOOD URINE: NEGATIVE
BUN SERPL-MCNC: 12 MG/DL
CALCIUM SERPL-MCNC: 10.3 MG/DL
CHLORIDE SERPL-SCNC: 103 MMOL/L
CK SERPL-CCNC: 211 U/L
CO2 SERPL-SCNC: 25 MMOL/L
COLOR: NORMAL
CREAT SERPL-MCNC: 0.58 MG/DL
CREAT SPEC-SCNC: 13 MG/DL
CREAT/PROT UR: 0.4 RATIO
EOSINOPHIL # BLD AUTO: 0.08 K/UL
EOSINOPHIL NFR BLD AUTO: 1.2 %
GLUCOSE QUALITATIVE U: NEGATIVE
GLUCOSE SERPL-MCNC: 85 MG/DL
HAV IGM SER QL: NONREACTIVE
HBV CORE IGG+IGM SER QL: NONREACTIVE
HBV CORE IGM SER QL: NONREACTIVE
HBV SURFACE AG SER QL: NONREACTIVE
HCT VFR BLD CALC: 40.6 %
HCV AB SER QL: NONREACTIVE
HCV S/CO RATIO: 0.1 S/CO
HGB BLD-MCNC: 12.5 G/DL
HYALINE CASTS: 1 /LPF
IMM GRANULOCYTES NFR BLD AUTO: 0.2 %
KETONES URINE: NEGATIVE
LEUKOCYTE ESTERASE URINE: ABNORMAL
LYMPHOCYTES # BLD AUTO: 1.59 K/UL
LYMPHOCYTES NFR BLD AUTO: 24.4 %
MAN DIFF?: NORMAL
MCHC RBC-ENTMCNC: 27.1 PG
MCHC RBC-ENTMCNC: 30.8 GM/DL
MCV RBC AUTO: 88.1 FL
MICROSCOPIC-UA: NORMAL
MONOCYTES # BLD AUTO: 0.62 K/UL
MONOCYTES NFR BLD AUTO: 9.5 %
NEUTROPHILS # BLD AUTO: 4.17 K/UL
NEUTROPHILS NFR BLD AUTO: 64.1 %
NITRITE URINE: NEGATIVE
PH URINE: 7.5
PLATELET # BLD AUTO: 349 K/UL
POTASSIUM SERPL-SCNC: 4.4 MMOL/L
PROT SERPL-MCNC: 8.5 G/DL
PROT UR-MCNC: 5 MG/DL
PROTEIN URINE: NEGATIVE
RBC # BLD: 4.61 M/UL
RBC # FLD: 13.5 %
RED BLOOD CELLS URINE: 0 /HPF
SODIUM SERPL-SCNC: 140 MMOL/L
SPECIFIC GRAVITY URINE: 1
SQUAMOUS EPITHELIAL CELLS: 2 /HPF
UROBILINOGEN URINE: NORMAL
WBC # FLD AUTO: 6.51 K/UL
WHITE BLOOD CELLS URINE: 0 /HPF

## 2019-07-24 LAB
M TB IFN-G BLD-IMP: NEGATIVE
QUANTIFERON TB PLUS MITOGEN MINUS NIL: 9.72 IU/ML
QUANTIFERON TB PLUS NIL: 0.03 IU/ML
QUANTIFERON TB PLUS TB1 MINUS NIL: 0 IU/ML
QUANTIFERON TB PLUS TB2 MINUS NIL: 0 IU/ML

## 2019-09-04 NOTE — ED ADULT NURSE NOTE - BREATHING, MLM
Detail Level: Simple Detail Level: Zone Include Location In Plan?: Yes Hide Include Location In Plan Question?: No Detail Level: Detailed Spontaneous, unlabored and symmetrical

## 2019-10-09 ENCOUNTER — RX RENEWAL (OUTPATIENT)
Age: 29
End: 2019-10-09

## 2019-10-24 ENCOUNTER — CHART COPY (OUTPATIENT)
Age: 29
End: 2019-10-24

## 2019-10-28 ENCOUNTER — RX CHANGE (OUTPATIENT)
Age: 29
End: 2019-10-28

## 2019-10-28 ENCOUNTER — RX RENEWAL (OUTPATIENT)
Age: 29
End: 2019-10-28

## 2019-10-28 RX ORDER — RANITIDINE HYDROCHLORIDE 150 MG/1
150 CAPSULE ORAL TWICE DAILY
Qty: 180 | Refills: 0 | Status: DISCONTINUED | COMMUNITY
Start: 2018-11-20 | End: 2019-10-28

## 2019-11-26 ENCOUNTER — APPOINTMENT (OUTPATIENT)
Dept: INTERNAL MEDICINE | Facility: CLINIC | Age: 29
End: 2019-11-26
Payer: COMMERCIAL

## 2019-11-26 VITALS
HEART RATE: 130 BPM | SYSTOLIC BLOOD PRESSURE: 120 MMHG | TEMPERATURE: 98.2 F | WEIGHT: 120 LBS | DIASTOLIC BLOOD PRESSURE: 80 MMHG | BODY MASS INDEX: 22.08 KG/M2 | HEIGHT: 62 IN

## 2019-11-26 DIAGNOSIS — Z82.49 FAMILY HISTORY OF ISCHEMIC HEART DISEASE AND OTHER DISEASES OF THE CIRCULATORY SYSTEM: ICD-10-CM

## 2019-11-26 PROCEDURE — 99395 PREV VISIT EST AGE 18-39: CPT | Mod: 25

## 2019-11-26 PROCEDURE — 36415 COLL VENOUS BLD VENIPUNCTURE: CPT

## 2019-11-26 RX ORDER — CARISOPRODOL 350 MG/1
350 TABLET ORAL TWICE DAILY
Qty: 60 | Refills: 0 | Status: DISCONTINUED | COMMUNITY
Start: 2018-11-20 | End: 2019-11-26

## 2019-11-26 RX ORDER — AZELASTINE HYDROCHLORIDE 205.5 UG/1
0.15 SPRAY, METERED NASAL TWICE DAILY
Qty: 3 | Refills: 0 | Status: DISCONTINUED | COMMUNITY
Start: 2019-04-22 | End: 2019-11-26

## 2019-11-26 NOTE — HISTORY OF PRESENT ILLNESS
[FreeTextEntry1] : Physical [de-identified] : Patient comes for an annual exam.\par \par She needs annual employee form completed for [employer] in [place]. 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 \par \par She exercises often with walking and yoga. She watches her diet, weight is normal. She still has Raynaud's symptoms including discomfort in hands. She is on Felodipine 10 mg daily. She is scheduled to see a rheumatologist at [facility] in 1/20 for a second opinion of her MCTD. She is on Plaquenil.

## 2019-11-26 NOTE — PHYSICAL EXAM
[No Acute Distress] : no acute distress [Well Nourished] : well nourished [Well Developed] : well developed [Well-Appearing] : well-appearing [Normal Sclera/Conjunctiva] : normal sclera/conjunctiva [PERRL] : pupils equal round and reactive to light [EOMI] : extraocular movements intact [Normal Outer Ear/Nose] : the outer ears and nose were normal in appearance [Normal Oropharynx] : the oropharynx was normal [No JVD] : no jugular venous distention [Supple] : supple [No Lymphadenopathy] : no lymphadenopathy [Thyroid Normal, No Nodules] : the thyroid was normal and there were no nodules present [No Respiratory Distress] : no respiratory distress  [Clear to Auscultation] : lungs were clear to auscultation bilaterally [No Accessory Muscle Use] : no accessory muscle use [Normal Rate] : normal rate  [Regular Rhythm] : with a regular rhythm [Normal S1, S2] : normal S1 and S2 [No Murmur] : no murmur heard [No Carotid Bruits] : no carotid bruits [No Abdominal Bruit] : a ~M bruit was not heard ~T in the abdomen [No Varicosities] : no varicosities [Pedal Pulses Present] : the pedal pulses are present [No Edema] : there was no peripheral edema [No Extremity Clubbing/Cyanosis] : no extremity clubbing/cyanosis [No Palpable Aorta] : no palpable aorta [Soft] : abdomen soft [Non Tender] : non-tender [Non-distended] : non-distended [No Masses] : no abdominal mass palpated [No HSM] : no HSM [Normal Bowel Sounds] : normal bowel sounds [Normal Posterior Cervical Nodes] : no posterior cervical lymphadenopathy [Normal Anterior Cervical Nodes] : no anterior cervical lymphadenopathy [No Joint Swelling] : no joint swelling [Grossly Normal Strength/Tone] : grossly normal strength/tone [Normal Gait] : normal gait [Coordination Grossly Intact] : coordination grossly intact [No Focal Deficits] : no focal deficits [Deep Tendon Reflexes (DTR)] : deep tendon reflexes were 2+ and symmetric [Normal Affect] : the affect was normal [Normal Insight/Judgement] : insight and judgment were intact [Normal Voice/Communication] : normal voice/communication [No Spinal Tenderness] : no spinal tenderness [Normal TMs] : both tympanic membranes were normal [No CVA Tenderness] : no CVA  tenderness [Normal Mood] : the mood was normal [Scoliosis] : no scoliosis [Acne] : no acne [de-identified] : defer to GYN  [de-identified] : friendly  [de-identified] : faded telangiectasias on face and hands

## 2019-11-26 NOTE — PLAN
[FreeTextEntry1] : Check routine labs as above.\par Discussed diet, exercise, and weight maintenance. \par Vaccines all UTD.\par She is overdue for GYN exam and PAP smear. Provided contact name for new GYN doctor.\par Regarding MCTD, she is on Plaquenil and followed by Dr. Gonzales. She is seeing another rheumatologist at [facility] in January for a second opinion. She will continue Felodipine for Raynaud's. \par Hypothyroidism on Synthroid. Check TFT's.\par Anxiety well-controlled on Zoloft. Followed with therapist and doing well.\par GERD - switch Zantac back to Omeprazole 40 mg daily. Rx sent to pharmacy.\par Chronic tachycardia likely from anxiety in doctor's office. No cardiac complaints at current time. Monitor.\par To complete employee form. TB screening with [employer].\par \par RTO in 1 yr for annual exam or earlier PRN for acute care.\par

## 2019-11-26 NOTE — HEALTH RISK ASSESSMENT
[Excellent] : ~his/her~  mood as  excellent [No] : In the past 12 months have you used drugs other than those required for medical reasons? No [No falls in past year] : Patient reported no falls in the past year [0] : 2) Feeling down, depressed, or hopeless: Not at all (0) [Patient reported PAP Smear was normal] : Patient reported PAP Smear was normal [Alone] : lives alone [With Family] : lives with family [Employed] : employed [Graduate School] : graduate school [# Of Children ___] : has [unfilled] children [Single] : single [Smoke Detector] : smoke detector [Carbon Monoxide Detector] : carbon monoxide detector [Seat Belt] :  uses seat belt [Sunscreen] : uses sunscreen [] : No [Sexually Active] : not sexually active [High Risk Behavior] : no high risk behavior [Reports changes in hearing] : Reports no changes in hearing [Reports changes in vision] : Reports no changes in vision [Reports changes in dental health] : Reports no changes in dental health [PapSmearDate] : 06/17 [PapSmearComments] : overdue [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

## 2019-11-28 ENCOUNTER — TRANSCRIPTION ENCOUNTER (OUTPATIENT)
Age: 29
End: 2019-11-28

## 2019-11-29 LAB
25(OH)D3 SERPL-MCNC: 34.5 NG/ML
ALBUMIN SERPL ELPH-MCNC: 4.8 G/DL
ALP BLD-CCNC: 80 U/L
ALT SERPL-CCNC: 14 U/L
ANION GAP SERPL CALC-SCNC: 16 MMOL/L
APPEARANCE: CLEAR
AST SERPL-CCNC: 25 U/L
BACTERIA: NEGATIVE
BASOPHILS # BLD AUTO: 0.03 K/UL
BASOPHILS NFR BLD AUTO: 0.5 %
BILIRUB SERPL-MCNC: 0.4 MG/DL
BILIRUBIN URINE: NEGATIVE
BLOOD URINE: NEGATIVE
BUN SERPL-MCNC: 11 MG/DL
CALCIUM SERPL-MCNC: 10 MG/DL
CHLORIDE SERPL-SCNC: 101 MMOL/L
CHOLEST SERPL-MCNC: 130 MG/DL
CHOLEST/HDLC SERPL: 2.6 RATIO
CO2 SERPL-SCNC: 23 MMOL/L
COLOR: COLORLESS
CREAT SERPL-MCNC: 0.54 MG/DL
EOSINOPHIL # BLD AUTO: 0.11 K/UL
EOSINOPHIL NFR BLD AUTO: 1.9 %
ESTIMATED AVERAGE GLUCOSE: 97 MG/DL
GLUCOSE QUALITATIVE U: NEGATIVE
GLUCOSE SERPL-MCNC: 84 MG/DL
HBA1C MFR BLD HPLC: 5 %
HCT VFR BLD CALC: 40.4 %
HDLC SERPL-MCNC: 50 MG/DL
HGB BLD-MCNC: 12.8 G/DL
HYALINE CASTS: 0 /LPF
IMM GRANULOCYTES NFR BLD AUTO: 0.2 %
KETONES URINE: NEGATIVE
LDLC SERPL CALC-MCNC: 68 MG/DL
LEUKOCYTE ESTERASE URINE: NEGATIVE
LYMPHOCYTES # BLD AUTO: 1.12 K/UL
LYMPHOCYTES NFR BLD AUTO: 19.7 %
MAN DIFF?: NORMAL
MCHC RBC-ENTMCNC: 27 PG
MCHC RBC-ENTMCNC: 31.7 GM/DL
MCV RBC AUTO: 85.2 FL
MICROSCOPIC-UA: NORMAL
MONOCYTES # BLD AUTO: 0.51 K/UL
MONOCYTES NFR BLD AUTO: 9 %
NEUTROPHILS # BLD AUTO: 3.91 K/UL
NEUTROPHILS NFR BLD AUTO: 68.7 %
NITRITE URINE: NEGATIVE
PH URINE: 6.5
PLATELET # BLD AUTO: 334 K/UL
POTASSIUM SERPL-SCNC: 4.6 MMOL/L
PROT SERPL-MCNC: 8.1 G/DL
PROTEIN URINE: NEGATIVE
RBC # BLD: 4.74 M/UL
RBC # FLD: 13.3 %
RED BLOOD CELLS URINE: 1 /HPF
SODIUM SERPL-SCNC: 140 MMOL/L
SPECIFIC GRAVITY URINE: 1.01
SQUAMOUS EPITHELIAL CELLS: 1 /HPF
T4 FREE SERPL-MCNC: 1.7 NG/DL
TRIGL SERPL-MCNC: 59 MG/DL
TSH SERPL-ACNC: 2.89 UIU/ML
UROBILINOGEN URINE: NORMAL
VIT B12 SERPL-MCNC: 785 PG/ML
WBC # FLD AUTO: 5.69 K/UL
WHITE BLOOD CELLS URINE: 0 /HPF

## 2019-12-02 LAB
M TB IFN-G BLD-IMP: NEGATIVE
QUANTIFERON TB PLUS MITOGEN MINUS NIL: 2.76 IU/ML
QUANTIFERON TB PLUS NIL: 0.02 IU/ML
QUANTIFERON TB PLUS TB1 MINUS NIL: 0.01 IU/ML
QUANTIFERON TB PLUS TB2 MINUS NIL: 0 IU/ML

## 2020-01-09 ENCOUNTER — RX RENEWAL (OUTPATIENT)
Age: 30
End: 2020-01-09

## 2020-01-21 ENCOUNTER — RX RENEWAL (OUTPATIENT)
Age: 30
End: 2020-01-21

## 2020-03-16 ENCOUNTER — TRANSCRIPTION ENCOUNTER (OUTPATIENT)
Age: 30
End: 2020-03-16

## 2020-04-15 ENCOUNTER — TRANSCRIPTION ENCOUNTER (OUTPATIENT)
Age: 30
End: 2020-04-15

## 2020-05-28 ENCOUNTER — RX RENEWAL (OUTPATIENT)
Age: 30
End: 2020-05-28

## 2020-06-07 ENCOUNTER — RX RENEWAL (OUTPATIENT)
Age: 30
End: 2020-06-07

## 2020-06-08 ENCOUNTER — TRANSCRIPTION ENCOUNTER (OUTPATIENT)
Age: 30
End: 2020-06-08

## 2020-07-02 ENCOUNTER — RX RENEWAL (OUTPATIENT)
Age: 30
End: 2020-07-02

## 2020-07-13 ENCOUNTER — TRANSCRIPTION ENCOUNTER (OUTPATIENT)
Age: 30
End: 2020-07-13

## 2020-07-16 ENCOUNTER — APPOINTMENT (OUTPATIENT)
Dept: INTERNAL MEDICINE | Facility: CLINIC | Age: 30
End: 2020-07-16
Payer: COMMERCIAL

## 2020-07-16 VITALS
WEIGHT: 130 LBS | DIASTOLIC BLOOD PRESSURE: 88 MMHG | SYSTOLIC BLOOD PRESSURE: 140 MMHG | HEART RATE: 77 BPM | TEMPERATURE: 98.1 F | HEIGHT: 62 IN | OXYGEN SATURATION: 96 % | BODY MASS INDEX: 23.92 KG/M2

## 2020-07-16 PROCEDURE — 99214 OFFICE O/P EST MOD 30 MIN: CPT

## 2020-07-16 NOTE — REVIEW OF SYSTEMS
[de-identified] : Finger tip pitting improving.  Facial telangiectasia improving with laser treaments   [Negative] : Heme/Lymph

## 2020-07-16 NOTE — PLAN
[FreeTextEntry1] : BW done in my office today for Dr Randolph Gonzales\par Pt has responded very well with Felodipine since I start this medication with her.  Pitting of distal digits has significantly improved and no cyanosis of digits.  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 With her age and chronic use of this medication and long term side effects I want to see what her options might be. \par I have know Lilia for many years and I highly recommend she see Dr Vides for this consult.

## 2020-07-16 NOTE — PHYSICAL EXAM
[Normal TMs] : both tympanic membranes were normal [de-identified] : Pitting in distal digits significant improvement from last year.  no cyanosis of digits  [Normal] : no respiratory distress, lungs were clear to auscultation bilaterally and no accessory muscle use

## 2020-07-16 NOTE — HISTORY OF PRESENT ILLNESS
[FreeTextEntry1] : 29 y/o female present today for a f/u visit  [de-identified] : Pt presents to the office today for a follow up with me as I have not seen her in over a year as there was a issue with insurance.\par She has been feeling well overall\par She has been seeing her Rheumatologist and has seen Dr Ross in my office for CPE when I was not able to see her.   She has no medical changes

## 2020-07-17 LAB
25(OH)D3 SERPL-MCNC: 35.7 NG/ML
ALBUMIN SERPL ELPH-MCNC: 5.3 G/DL
ALP BLD-CCNC: 88 U/L
ALT SERPL-CCNC: 28 U/L
ANION GAP SERPL CALC-SCNC: 16 MMOL/L
APPEARANCE: CLEAR
AST SERPL-CCNC: 44 U/L
BACTERIA: NEGATIVE
BILIRUB SERPL-MCNC: 0.2 MG/DL
BILIRUBIN URINE: NEGATIVE
BLOOD URINE: NEGATIVE
BUN SERPL-MCNC: 13 MG/DL
C3 SERPL-MCNC: 138 MG/DL
C4 SERPL-MCNC: 19 MG/DL
CALCIUM SERPL-MCNC: 10.2 MG/DL
CHLORIDE SERPL-SCNC: 101 MMOL/L
CK SERPL-CCNC: 259 U/L
CO2 SERPL-SCNC: 22 MMOL/L
COLOR: NORMAL
CREAT SERPL-MCNC: 0.58 MG/DL
CREAT SPEC-SCNC: 44 MG/DL
CREAT/PROT UR: 0.1 RATIO
DSDNA AB SER-ACNC: <12 IU/ML
ENA RNP AB SER IA-ACNC: >8 AL
ENA SCL70 IGG SER IA-ACNC: <0.2 AL
ENA SM AB SER IA-ACNC: 3.8 AL
ENA SS-A AB SER IA-ACNC: <0.2 AL
ENA SS-B AB SER IA-ACNC: <0.2 AL
GLUCOSE QUALITATIVE U: NEGATIVE
GLUCOSE SERPL-MCNC: 88 MG/DL
HYALINE CASTS: 0 /LPF
INR PPP: 0.97 RATIO
KETONES URINE: NEGATIVE
LEUKOCYTE ESTERASE URINE: NEGATIVE
MICROSCOPIC-UA: NORMAL
NITRITE URINE: NEGATIVE
PH URINE: 7
POTASSIUM SERPL-SCNC: 4.6 MMOL/L
PROT SERPL-MCNC: 9 G/DL
PROT UR-MCNC: 5 MG/DL
PROTEIN URINE: NEGATIVE
PT BLD: 11.6 SEC
RED BLOOD CELLS URINE: 2 /HPF
SODIUM SERPL-SCNC: 139 MMOL/L
SPECIFIC GRAVITY URINE: 1.01
SQUAMOUS EPITHELIAL CELLS: 0 /HPF
TSH SERPL-ACNC: 1.03 UIU/ML
UROBILINOGEN URINE: NORMAL
WHITE BLOOD CELLS URINE: 0 /HPF

## 2020-07-19 LAB — DEPRECATED CARDIOLIPIN IGA SER: <5 APL

## 2020-07-20 ENCOUNTER — TRANSCRIPTION ENCOUNTER (OUTPATIENT)
Age: 30
End: 2020-07-20

## 2020-07-22 LAB
B2 GLYCOPROT1 IGA SERPL IA-ACNC: <5 SAU
RNA POLYMERASE III IGG: 3 UNITS

## 2020-07-27 ENCOUNTER — TRANSCRIPTION ENCOUNTER (OUTPATIENT)
Age: 30
End: 2020-07-27

## 2020-08-04 NOTE — DISCHARGE NOTE ADULT - FUNCTIONAL SCREEN CURRENT LEVEL: AMBULATION, MLM
Discharge instructions reviewed verbally and in writing including f/u appointments. Patient verbalizes understanding and signed as such. All belongings returned for discharge. Patient provided with food/drug interaction booklet. Patient denies SI, HI, A/V hallucinations, mood is stable.    Insulin pump returned to the patient  D/c with mother for transport home (0) independent

## 2020-08-17 ENCOUNTER — TRANSCRIPTION ENCOUNTER (OUTPATIENT)
Age: 30
End: 2020-08-17

## 2020-08-18 ENCOUNTER — INPATIENT (INPATIENT)
Facility: HOSPITAL | Age: 30
LOS: 0 days | Discharge: HOME | DRG: 339 | End: 2020-08-19
Attending: SURGERY | Admitting: SURGERY
Payer: COMMERCIAL

## 2020-08-18 ENCOUNTER — RESULT REVIEW (OUTPATIENT)
Age: 30
End: 2020-08-18

## 2020-08-18 VITALS
WEIGHT: 130.07 LBS | OXYGEN SATURATION: 100 % | RESPIRATION RATE: 15 BRPM | SYSTOLIC BLOOD PRESSURE: 129 MMHG | TEMPERATURE: 99 F | HEIGHT: 62 IN | HEART RATE: 98 BPM | DIASTOLIC BLOOD PRESSURE: 84 MMHG

## 2020-08-18 DIAGNOSIS — K37 UNSPECIFIED APPENDICITIS: ICD-10-CM

## 2020-08-18 LAB
ALBUMIN SERPL ELPH-MCNC: 3.8 G/DL — SIGNIFICANT CHANGE UP (ref 3.3–5)
ALP SERPL-CCNC: 86 U/L — SIGNIFICANT CHANGE UP (ref 40–120)
ALT FLD-CCNC: 38 U/L — SIGNIFICANT CHANGE UP (ref 12–78)
ANION GAP SERPL CALC-SCNC: 8 MMOL/L — SIGNIFICANT CHANGE UP (ref 5–17)
APPEARANCE UR: CLEAR — SIGNIFICANT CHANGE UP
APTT BLD: 28.4 SEC — SIGNIFICANT CHANGE UP (ref 27.5–35.5)
AST SERPL-CCNC: 33 U/L — SIGNIFICANT CHANGE UP (ref 15–37)
BASOPHILS # BLD AUTO: 0.02 K/UL — SIGNIFICANT CHANGE UP (ref 0–0.2)
BASOPHILS NFR BLD AUTO: 0.1 % — SIGNIFICANT CHANGE UP (ref 0–2)
BILIRUB SERPL-MCNC: 0.8 MG/DL — SIGNIFICANT CHANGE UP (ref 0.2–1.2)
BILIRUB UR-MCNC: NEGATIVE — SIGNIFICANT CHANGE UP
BLD GP AB SCN SERPL QL: SIGNIFICANT CHANGE UP
BUN SERPL-MCNC: 8 MG/DL — SIGNIFICANT CHANGE UP (ref 7–23)
CALCIUM SERPL-MCNC: 8.9 MG/DL — SIGNIFICANT CHANGE UP (ref 8.5–10.1)
CHLORIDE SERPL-SCNC: 100 MMOL/L — SIGNIFICANT CHANGE UP (ref 96–108)
CO2 SERPL-SCNC: 26 MMOL/L — SIGNIFICANT CHANGE UP (ref 22–31)
COLOR SPEC: YELLOW — SIGNIFICANT CHANGE UP
CREAT SERPL-MCNC: 0.62 MG/DL — SIGNIFICANT CHANGE UP (ref 0.5–1.3)
DIFF PNL FLD: NEGATIVE — SIGNIFICANT CHANGE UP
EOSINOPHIL # BLD AUTO: 0.01 K/UL — SIGNIFICANT CHANGE UP (ref 0–0.5)
EOSINOPHIL NFR BLD AUTO: 0.1 % — SIGNIFICANT CHANGE UP (ref 0–6)
GLUCOSE SERPL-MCNC: 106 MG/DL — HIGH (ref 70–99)
GLUCOSE UR QL: NEGATIVE — SIGNIFICANT CHANGE UP
HCG SERPL-ACNC: <1 MIU/ML — SIGNIFICANT CHANGE UP
HCT VFR BLD CALC: 34.3 % — LOW (ref 34.5–45)
HGB BLD-MCNC: 11.2 G/DL — LOW (ref 11.5–15.5)
IMM GRANULOCYTES NFR BLD AUTO: 0.4 % — SIGNIFICANT CHANGE UP (ref 0–1.5)
INR BLD: 1.2 RATIO — HIGH (ref 0.88–1.16)
KETONES UR-MCNC: NEGATIVE — SIGNIFICANT CHANGE UP
LEUKOCYTE ESTERASE UR-ACNC: NEGATIVE — SIGNIFICANT CHANGE UP
LIDOCAIN IGE QN: 43 U/L — LOW (ref 73–393)
LYMPHOCYTES # BLD AUTO: 0.81 K/UL — LOW (ref 1–3.3)
LYMPHOCYTES # BLD AUTO: 4.3 % — LOW (ref 13–44)
MANUAL SMEAR VERIFICATION: SIGNIFICANT CHANGE UP
MCHC RBC-ENTMCNC: 25.6 PG — LOW (ref 27–34)
MCHC RBC-ENTMCNC: 32.7 GM/DL — SIGNIFICANT CHANGE UP (ref 32–36)
MCV RBC AUTO: 78.5 FL — LOW (ref 80–100)
MONOCYTES # BLD AUTO: 1.66 K/UL — HIGH (ref 0–0.9)
MONOCYTES NFR BLD AUTO: 8.7 % — SIGNIFICANT CHANGE UP (ref 2–14)
NEUTROPHILS # BLD AUTO: 16.42 K/UL — HIGH (ref 1.8–7.4)
NEUTROPHILS NFR BLD AUTO: 86.4 % — HIGH (ref 43–77)
NITRITE UR-MCNC: NEGATIVE — SIGNIFICANT CHANGE UP
NRBC # BLD: 0 /100 WBCS — SIGNIFICANT CHANGE UP (ref 0–0)
PH UR: 7 — SIGNIFICANT CHANGE UP (ref 5–8)
PLAT MORPH BLD: NORMAL — SIGNIFICANT CHANGE UP
PLATELET # BLD AUTO: 390 K/UL — SIGNIFICANT CHANGE UP (ref 150–400)
POTASSIUM SERPL-MCNC: 3.8 MMOL/L — SIGNIFICANT CHANGE UP (ref 3.5–5.3)
POTASSIUM SERPL-SCNC: 3.8 MMOL/L — SIGNIFICANT CHANGE UP (ref 3.5–5.3)
PROT SERPL-MCNC: 8.6 G/DL — HIGH (ref 6–8.3)
PROT UR-MCNC: NEGATIVE — SIGNIFICANT CHANGE UP
PROTHROM AB SERPL-ACNC: 13.9 SEC — HIGH (ref 10.6–13.6)
RBC # BLD: 4.37 M/UL — SIGNIFICANT CHANGE UP (ref 3.8–5.2)
RBC # FLD: 13 % — SIGNIFICANT CHANGE UP (ref 10.3–14.5)
RBC BLD AUTO: SIGNIFICANT CHANGE UP
SARS-COV-2 RNA SPEC QL NAA+PROBE: SIGNIFICANT CHANGE UP
SODIUM SERPL-SCNC: 134 MMOL/L — LOW (ref 135–145)
SP GR SPEC: 1 — LOW (ref 1.01–1.02)
UROBILINOGEN FLD QL: NEGATIVE — SIGNIFICANT CHANGE UP
WBC # BLD: 19 K/UL — HIGH (ref 3.8–10.5)
WBC # FLD AUTO: 19 K/UL — HIGH (ref 3.8–10.5)

## 2020-08-18 PROCEDURE — 44970 LAPAROSCOPY APPENDECTOMY: CPT

## 2020-08-18 PROCEDURE — 99223 1ST HOSP IP/OBS HIGH 75: CPT | Mod: 57

## 2020-08-18 PROCEDURE — 74177 CT ABD & PELVIS W/CONTRAST: CPT | Mod: 26

## 2020-08-18 PROCEDURE — 99285 EMERGENCY DEPT VISIT HI MDM: CPT

## 2020-08-18 PROCEDURE — 88304 TISSUE EXAM BY PATHOLOGIST: CPT | Mod: 26

## 2020-08-18 PROCEDURE — 76830 TRANSVAGINAL US NON-OB: CPT | Mod: 26

## 2020-08-18 PROCEDURE — 44970 LAPAROSCOPY APPENDECTOMY: CPT | Mod: AS

## 2020-08-18 RX ORDER — HYDROXYCHLOROQUINE SULFATE 200 MG
200 TABLET ORAL DAILY
Refills: 0 | Status: DISCONTINUED | OUTPATIENT
Start: 2020-08-18 | End: 2020-08-19

## 2020-08-18 RX ORDER — IBUPROFEN 200 MG
1 TABLET ORAL
Qty: 0 | Refills: 0 | DISCHARGE

## 2020-08-18 RX ORDER — MORPHINE SULFATE 50 MG/1
4 CAPSULE, EXTENDED RELEASE ORAL ONCE
Refills: 0 | Status: DISCONTINUED | OUTPATIENT
Start: 2020-08-18 | End: 2020-08-18

## 2020-08-18 RX ORDER — METRONIDAZOLE 500 MG
500 TABLET ORAL EVERY 8 HOURS
Refills: 0 | Status: DISCONTINUED | OUTPATIENT
Start: 2020-08-18 | End: 2020-08-19

## 2020-08-18 RX ORDER — METRONIDAZOLE 500 MG
TABLET ORAL
Refills: 0 | Status: DISCONTINUED | OUTPATIENT
Start: 2020-08-18 | End: 2020-08-18

## 2020-08-18 RX ORDER — METRONIDAZOLE 500 MG
500 TABLET ORAL EVERY 8 HOURS
Refills: 0 | Status: DISCONTINUED | OUTPATIENT
Start: 2020-08-18 | End: 2020-08-18

## 2020-08-18 RX ORDER — LANOLIN ALCOHOL/MO/W.PET/CERES
3 CREAM (GRAM) TOPICAL AT BEDTIME
Refills: 0 | Status: DISCONTINUED | OUTPATIENT
Start: 2020-08-18 | End: 2020-08-19

## 2020-08-18 RX ORDER — OXYCODONE HYDROCHLORIDE 5 MG/1
5 TABLET ORAL ONCE
Refills: 0 | Status: DISCONTINUED | OUTPATIENT
Start: 2020-08-18 | End: 2020-08-18

## 2020-08-18 RX ORDER — IBUPROFEN 200 MG
600 TABLET ORAL EVERY 8 HOURS
Refills: 0 | Status: DISCONTINUED | OUTPATIENT
Start: 2020-08-18 | End: 2020-08-19

## 2020-08-18 RX ORDER — OMEPRAZOLE 10 MG/1
1 CAPSULE, DELAYED RELEASE ORAL
Qty: 0 | Refills: 0 | DISCHARGE

## 2020-08-18 RX ORDER — SODIUM CHLORIDE 9 MG/ML
1000 INJECTION, SOLUTION INTRAVENOUS
Refills: 0 | Status: DISCONTINUED | OUTPATIENT
Start: 2020-08-18 | End: 2020-08-18

## 2020-08-18 RX ORDER — CIPROFLOXACIN LACTATE 400MG/40ML
400 VIAL (ML) INTRAVENOUS ONCE
Refills: 0 | Status: COMPLETED | OUTPATIENT
Start: 2020-08-18 | End: 2020-08-18

## 2020-08-18 RX ORDER — HYDROXYCHLOROQUINE SULFATE 200 MG
1 TABLET ORAL
Qty: 0 | Refills: 0 | DISCHARGE

## 2020-08-18 RX ORDER — TIZANIDINE 4 MG/1
1 TABLET ORAL
Qty: 0 | Refills: 0 | DISCHARGE

## 2020-08-18 RX ORDER — OXYCODONE HYDROCHLORIDE 5 MG/1
5 TABLET ORAL EVERY 4 HOURS
Refills: 0 | Status: DISCONTINUED | OUTPATIENT
Start: 2020-08-18 | End: 2020-08-19

## 2020-08-18 RX ORDER — ACETAMINOPHEN 500 MG
1000 TABLET ORAL ONCE
Refills: 0 | Status: COMPLETED | OUTPATIENT
Start: 2020-08-18 | End: 2020-08-18

## 2020-08-18 RX ORDER — ONDANSETRON 8 MG/1
4 TABLET, FILM COATED ORAL EVERY 6 HOURS
Refills: 0 | Status: DISCONTINUED | OUTPATIENT
Start: 2020-08-18 | End: 2020-08-19

## 2020-08-18 RX ORDER — PANTOPRAZOLE SODIUM 20 MG/1
40 TABLET, DELAYED RELEASE ORAL
Refills: 0 | Status: DISCONTINUED | OUTPATIENT
Start: 2020-08-18 | End: 2020-08-19

## 2020-08-18 RX ORDER — HYDROXYCHLOROQUINE SULFATE 200 MG
200 TABLET ORAL
Qty: 0 | Refills: 0 | DISCHARGE

## 2020-08-18 RX ORDER — ONDANSETRON 8 MG/1
4 TABLET, FILM COATED ORAL ONCE
Refills: 0 | Status: COMPLETED | OUTPATIENT
Start: 2020-08-18 | End: 2020-08-18

## 2020-08-18 RX ORDER — CIPROFLOXACIN LACTATE 400MG/40ML
400 VIAL (ML) INTRAVENOUS EVERY 12 HOURS
Refills: 0 | Status: CANCELLED | OUTPATIENT
Start: 2020-08-19 | End: 2020-08-18

## 2020-08-18 RX ORDER — ACETAMINOPHEN 500 MG
1000 TABLET ORAL EVERY 6 HOURS
Refills: 0 | Status: DISCONTINUED | OUTPATIENT
Start: 2020-08-18 | End: 2020-08-19

## 2020-08-18 RX ORDER — LEVOTHYROXINE SODIUM 125 MCG
137 TABLET ORAL DAILY
Refills: 0 | Status: DISCONTINUED | OUTPATIENT
Start: 2020-08-18 | End: 2020-08-19

## 2020-08-18 RX ORDER — FELODIPINE 5 MG/1
1 TABLET, FILM COATED, EXTENDED RELEASE ORAL
Qty: 0 | Refills: 0 | DISCHARGE

## 2020-08-18 RX ORDER — METRONIDAZOLE 500 MG
500 TABLET ORAL ONCE
Refills: 0 | Status: COMPLETED | OUTPATIENT
Start: 2020-08-18 | End: 2020-08-18

## 2020-08-18 RX ORDER — SERTRALINE 25 MG/1
1 TABLET, FILM COATED ORAL
Qty: 0 | Refills: 0 | DISCHARGE

## 2020-08-18 RX ORDER — CIPROFLOXACIN LACTATE 400MG/40ML
VIAL (ML) INTRAVENOUS
Refills: 0 | Status: DISCONTINUED | OUTPATIENT
Start: 2020-08-18 | End: 2020-08-18

## 2020-08-18 RX ORDER — FELODIPINE 5 MG/1
10 TABLET, FILM COATED, EXTENDED RELEASE ORAL DAILY
Refills: 0 | Status: DISCONTINUED | OUTPATIENT
Start: 2020-08-18 | End: 2020-08-19

## 2020-08-18 RX ORDER — SODIUM CHLORIDE 9 MG/ML
1000 INJECTION INTRAMUSCULAR; INTRAVENOUS; SUBCUTANEOUS ONCE
Refills: 0 | Status: COMPLETED | OUTPATIENT
Start: 2020-08-18 | End: 2020-08-18

## 2020-08-18 RX ORDER — SENNA PLUS 8.6 MG/1
2 TABLET ORAL AT BEDTIME
Refills: 0 | Status: DISCONTINUED | OUTPATIENT
Start: 2020-08-18 | End: 2020-08-19

## 2020-08-18 RX ORDER — HYDROMORPHONE HYDROCHLORIDE 2 MG/ML
0.5 INJECTION INTRAMUSCULAR; INTRAVENOUS; SUBCUTANEOUS
Refills: 0 | Status: DISCONTINUED | OUTPATIENT
Start: 2020-08-18 | End: 2020-08-18

## 2020-08-18 RX ORDER — SERTRALINE 25 MG/1
50 TABLET, FILM COATED ORAL DAILY
Refills: 0 | Status: DISCONTINUED | OUTPATIENT
Start: 2020-08-18 | End: 2020-08-19

## 2020-08-18 RX ORDER — CIPROFLOXACIN LACTATE 400MG/40ML
400 VIAL (ML) INTRAVENOUS EVERY 12 HOURS
Refills: 0 | Status: DISCONTINUED | OUTPATIENT
Start: 2020-08-18 | End: 2020-08-19

## 2020-08-18 RX ORDER — SODIUM CHLORIDE 9 MG/ML
1000 INJECTION INTRAMUSCULAR; INTRAVENOUS; SUBCUTANEOUS
Refills: 0 | Status: DISCONTINUED | OUTPATIENT
Start: 2020-08-18 | End: 2020-08-19

## 2020-08-18 RX ADMIN — SODIUM CHLORIDE 1000 MILLILITER(S): 9 INJECTION INTRAMUSCULAR; INTRAVENOUS; SUBCUTANEOUS at 12:27

## 2020-08-18 RX ADMIN — MORPHINE SULFATE 4 MILLIGRAM(S): 50 CAPSULE, EXTENDED RELEASE ORAL at 14:00

## 2020-08-18 RX ADMIN — ONDANSETRON 4 MILLIGRAM(S): 8 TABLET, FILM COATED ORAL at 12:27

## 2020-08-18 RX ADMIN — Medication 400 MILLIGRAM(S): at 17:20

## 2020-08-18 RX ADMIN — SENNA PLUS 2 TABLET(S): 8.6 TABLET ORAL at 21:31

## 2020-08-18 RX ADMIN — Medication 1000 MILLIGRAM(S): at 17:50

## 2020-08-18 RX ADMIN — ONDANSETRON 4 MILLIGRAM(S): 8 TABLET, FILM COATED ORAL at 17:46

## 2020-08-18 RX ADMIN — Medication 600 MILLIGRAM(S): at 21:37

## 2020-08-18 RX ADMIN — MORPHINE SULFATE 4 MILLIGRAM(S): 50 CAPSULE, EXTENDED RELEASE ORAL at 12:27

## 2020-08-18 RX ADMIN — Medication 200 MILLIGRAM(S): at 21:31

## 2020-08-18 RX ADMIN — Medication 600 MILLIGRAM(S): at 22:37

## 2020-08-18 RX ADMIN — HYDROMORPHONE HYDROCHLORIDE 0.5 MILLIGRAM(S): 2 INJECTION INTRAMUSCULAR; INTRAVENOUS; SUBCUTANEOUS at 18:39

## 2020-08-18 RX ADMIN — HYDROMORPHONE HYDROCHLORIDE 0.5 MILLIGRAM(S): 2 INJECTION INTRAMUSCULAR; INTRAVENOUS; SUBCUTANEOUS at 18:24

## 2020-08-18 RX ADMIN — Medication 3 MILLIGRAM(S): at 21:37

## 2020-08-18 RX ADMIN — Medication 100 MILLIGRAM(S): at 21:31

## 2020-08-18 RX ADMIN — SERTRALINE 50 MILLIGRAM(S): 25 TABLET, FILM COATED ORAL at 21:31

## 2020-08-18 RX ADMIN — SODIUM CHLORIDE 75 MILLILITER(S): 9 INJECTION, SOLUTION INTRAVENOUS at 17:30

## 2020-08-18 RX ADMIN — SODIUM CHLORIDE 1000 MILLILITER(S): 9 INJECTION INTRAMUSCULAR; INTRAVENOUS; SUBCUTANEOUS at 13:26

## 2020-08-18 RX ADMIN — HYDROMORPHONE HYDROCHLORIDE 0.5 MILLIGRAM(S): 2 INJECTION INTRAMUSCULAR; INTRAVENOUS; SUBCUTANEOUS at 18:11

## 2020-08-18 RX ADMIN — HYDROMORPHONE HYDROCHLORIDE 0.5 MILLIGRAM(S): 2 INJECTION INTRAMUSCULAR; INTRAVENOUS; SUBCUTANEOUS at 17:56

## 2020-08-18 RX ADMIN — SODIUM CHLORIDE 75 MILLILITER(S): 9 INJECTION INTRAMUSCULAR; INTRAVENOUS; SUBCUTANEOUS at 20:39

## 2020-08-18 NOTE — H&P ADULT - NSICDXPASTMEDICALHX_GEN_ALL_CORE_FT
PAST MEDICAL HISTORY:  Anxiety     GERD (gastroesophageal reflux disease)     Hypothyroid     Mixed connective tissue disease

## 2020-08-18 NOTE — H&P ADULT - NSHPLABSRESULTS_GEN_ALL_CORE
Data:                        11.2   19.00 )-----------( 390      ( 18 Aug 2020 12:32 )             34.3         134<L>  |  100  |  8   ----------------------------<  106<H>  3.8   |  26  |  0.62    Ca    8.9      18 Aug 2020 12:51    TPro  8.6<H>  /  Alb  3.8  /  TBili  0.8  /  DBili  x   /  AST  33  /  ALT  38  /  AlkPhos  86        LIVER FUNCTIONS - ( 18 Aug 2020 12:51 )  Alb: 3.8 g/dL / Pro: 8.6 g/dL / ALK PHOS: 86 U/L / ALT: 38 U/L / AST: 33 U/L / GGT: x           Urinalysis Basic - ( 18 Aug 2020 14:21 )    Color: Yellow / Appearance: Clear / S.005 / pH: x  Gluc: x / Ketone: Negative  / Bili: Negative / Urobili: Negative   Blood: x / Protein: Negative / Nitrite: Negative   Leuk Esterase: Negative / RBC: x / WBC x   Sq Epi: x / Non Sq Epi: x / Bacteria: x    Radiology:  < from: CT Abdomen and Pelvis w/ IV Cont (20 @ 13:57) >    IMPRESSION:  Acute appendicitis, as described above. Moderate amount of fluid surrounding the cecum. No well-defined collection or abscess. Findings were discussed with Dr. Siddiqi at 2:25 PM on 2020.    Scattered faint tree-in-bud opacities in the lower lobes, nonspecific.    < end of copied text >

## 2020-08-18 NOTE — PROGRESS NOTE ADULT - SUBJECTIVE AND OBJECTIVE BOX
Post Operative Note  Patient: CHRISTEN RALPH 30y (1990) Female   MRN: [id number]  Location: [address]  Visit: 08-18-20 Inpatient  Date: 08-18-20 @ 22:56    Procedure: s/p lap appendectomy,     Subjective:   29 y/o F seen and examined at bedside. Pt offers no complaints at this time in NAD. Pt tolerating CLD, admits to voiding, denies any N/V. PT denies dizziness, chest pain, sob, nausea, vomiting, abdominal pain, or urinary complaints.    Objective:  Vitals: T(F): 98.5 (08-18-20 @ 21:47), Max: 100.9 (08-18-20 @ 16:58)  HR: 93 (08-18-20 @ 21:47)  BP: 111/73 (08-18-20 @ 21:47) (111/73 - 157/92)  RR: 17 (08-18-20 @ 21:47)  SpO2: 94% (08-18-20 @ 21:47)     In:   08-18-20 @ 07:01  -  08-18-20 @ 22:56  --------------------------------------------------------  IN: 0 mL      IV Fluids: sodium chloride 0.9%. 1000 milliLiter(s) (75 mL/Hr) IV Continuous <Continuous>      Out:   08-18-20 @ 07:01  -  08-18-20 @ 22:56  --------------------------------------------------------  OUT: 600 mL      EBL:     Voided Urine:   08-18-20 @ 07:01  -  08-18-20 @ 22:56  --------------------------------------------------------  OUT: 600 mL    PHYSICAL EXAM  GENERAL:  Well-nourished, well-developed Female lying comfortably in bed in NAD  CARDIO:  RRR, no MRG  RESPIRATORY: CTABL, no ronchi, rales or wheezing  ABDOMEN:  Soft, nonttp, nondistended, +bs, dressings c/d/i  EXTREMITIES: No calf tenderness  NEURO:  A&O x 3    Medications: [Standing]  acetaminophen   Tablet .. 1000 milliGRAM(s) Oral every 6 hours  ciprofloxacin   IVPB 400 milliGRAM(s) IV Intermittent every 12 hours  felodipine ER 10 milliGRAM(s) Oral daily  hydroxychloroquine 200 milliGRAM(s) Oral daily  ibuprofen  Tablet. 600 milliGRAM(s) Oral every 8 hours PRN  levothyroxine 137 MICROGram(s) Oral daily  melatonin 3 milliGRAM(s) Oral at bedtime PRN  metroNIDAZOLE  IVPB 500 milliGRAM(s) IV Intermittent every 8 hours  ondansetron Injectable 4 milliGRAM(s) IV Push every 6 hours PRN  oxyCODONE    IR 5 milliGRAM(s) Oral every 4 hours PRN  oxyCODONE    IR 5 milliGRAM(s) Oral every 4 hours PRN  pantoprazole    Tablet 40 milliGRAM(s) Oral before breakfast  senna 2 Tablet(s) Oral at bedtime  sertraline 50 milliGRAM(s) Oral daily  sodium chloride 0.9%. 1000 milliLiter(s) IV Continuous <Continuous>    Medications: [PRN]  acetaminophen   Tablet .. 1000 milliGRAM(s) Oral every 6 hours  ciprofloxacin   IVPB 400 milliGRAM(s) IV Intermittent every 12 hours  felodipine ER 10 milliGRAM(s) Oral daily  hydroxychloroquine 200 milliGRAM(s) Oral daily  ibuprofen  Tablet. 600 milliGRAM(s) Oral every 8 hours PRN  levothyroxine 137 MICROGram(s) Oral daily  melatonin 3 milliGRAM(s) Oral at bedtime PRN  metroNIDAZOLE  IVPB 500 milliGRAM(s) IV Intermittent every 8 hours  ondansetron Injectable 4 milliGRAM(s) IV Push every 6 hours PRN  oxyCODONE    IR 5 milliGRAM(s) Oral every 4 hours PRN  oxyCODONE    IR 5 milliGRAM(s) Oral every 4 hours PRN  pantoprazole    Tablet 40 milliGRAM(s) Oral before breakfast  senna 2 Tablet(s) Oral at bedtime  sertraline 50 milliGRAM(s) Oral daily  sodium chloride 0.9%. 1000 milliLiter(s) IV Continuous <Continuous>    Labs:                        11.2   19.00 )-----------( 390      ( 18 Aug 2020 12:32 )             34.3     08-18    134<L>  |  100  |  8   ----------------------------<  106<H>  3.8   |  26  |  0.62    Ca    8.9      18 Aug 2020 12:51    TPro  8.6<H>  /  Alb  3.8  /  TBili  0.8  /  DBili  x   /  AST  33  /  ALT  38  /  AlkPhos  86  08-18    PT/INR - ( 18 Aug 2020 12:32 )   PT: 13.9 sec;   INR: 1.20 ratio         PTT - ( 18 Aug 2020 12:32 )  PTT:28.4 sec      Assessment:  29 y/o F s/p lap appendectomy, tolerating CLD,    Plan:  - cont cld, adv as tolerated  - cont IV abx  - f/u am labs  - pain control, supportive care  - zofran prn for nausea  - OOB ambulation as tolerated  - Incentive spirometry   - Will continue to monitor

## 2020-08-18 NOTE — ED ADULT NURSE NOTE - CCCP TRG CHIEF CMPLNT
Bedside and Verbal shift change report given to Wili Sibley RN (oncoming nurse) by Noemí Starkey RN (offgoing nurse). Report included the following information SBAR, Kardex, ED Summary, Procedure Summary, Intake/Output, MAR and Recent Results. abdominal pain

## 2020-08-18 NOTE — ED PROVIDER NOTE - OBJECTIVE STATEMENT
pt c/o gradual onset worsening rlq abd pain since yest. + n/v. no fevers chills, ha, dizziness, diarrhea, constipation, dysuria, hematuria, freq, vag bleed or d/c. pt went to urgent care had neg ucg, sent to er.  jeyson bruner/stacie

## 2020-08-18 NOTE — H&P ADULT - HISTORY OF PRESENT ILLNESS
This is a 30y year old Female with PMHx of mixed connective tissue disorder, hypothyroid, GERD and anxiety presenting with complaint of RLQ pain since Monday morning. Pain became worse last night with some associated nausea and vomiting. Pain described as sharp stabbing, constant 9/10 non-radiating, made worse with moving.  LMP 28 days ago, last BM Sunday. Denies history of anorexia, fevers, chills, chest pain, shortness of breath, diarrhea urinary complaints.

## 2020-08-18 NOTE — H&P ADULT - NSHPPHYSICALEXAM_GEN_ALL_CORE
PHYSICAL EXAM:  GENERAL: No acute distress, well-developed  HEAD:  Atraumatic, Normocephalic  CHEST/LUNG: CTAB; No wheezes, rales, or rhonchi  HEART: Regular rate and rhythm; No murmurs, rubs, or gallops  ABDOMEN: Soft, tender RLQ, mildly distended; bowel sounds+  NEUROLOGY: A&O x 3, no focal deficits

## 2020-08-18 NOTE — H&P ADULT - ATTENDING COMMENTS
I have personally seen, examined, and participated in the care of this patient. I have reviewed all pertinent clinical information, including history, physical exam, plan, and the PA's note and agree except as noted.    2 day history of worsening RLQ pain, leukocytosis.  Denies fever at home.  Hx of mixed connective tissue disorder, Raynaud's and hypothyroid.  CT scan personally reviewed.  Appendicitis with pericecal stranding.  Appears retrocecal with adjacent fluid suspicious for early perforation.    Will need emergency Laparoscopic appendectomy, possible open.  Risk, Benefits, and Alternatives to surgery have been discussed.  This includes but is not limited to bleeding, infection, damage to adjacent structures, need for additional surgery or interventions, adverse effects of anesthesia such as cardio-respiratory complications, prolonged intubation, cardiac arrhythmia, arrest, and or death.  Risks of forgoing surgery have also been discussed including progression of, and/or worsening of current condition which may then require urgent or emergent treatment or surgery.    COVID test pending.  Will proceed with OR despite lack of results.  Anticipate results prior to completion of procedure to coordinate post operative disposition.    Anticipate overnight stay for continued antibiotics.  Informed consent obtained from patient.  All questions answered.  Mother present at bedside, her questions addressed as well.

## 2020-08-18 NOTE — ED ADULT NURSE NOTE - OBJECTIVE STATEMENT
This is a 31 y/o female received ambulatory AXO&4 c/o abdominal pain associated with nausea, and vomiting. Patient abdomen soft nontender nondistended, respiration even unlabored lung field clear bilaterally plan of care explained to patient will monitor support and safety maintained.

## 2020-08-18 NOTE — H&P ADULT - NSHPREVIEWOFSYSTEMS_GEN_ALL_CORE
ROS:  Constitutional: Denies fever, fatigue or weight loss.  Skin: Denies rash.  Eyes: Denies recent vision problems or eye pain.  ENT: Denies congestion, ear pain, or sore throat.  Endocrine: Denies thyroid problems.  Cardiovascular: Denies chest pain or palpation.  Respiratory: Denies cough, shortness of breath, congestion, or wheezing.  Gastrointestinal: Denies abdominal pain, nausea, vomiting, or diarrhea.  Genitourinary: Denies dysuria.  Musculoskeletal: Denies joint swelling.  Neurologic: Denies headache.

## 2020-08-18 NOTE — H&P ADULT - ASSESSMENT
Assessment:     This is a 30y year old Female presenting with acute appy.    Plan:  -Discussed with Dr. Leigh   -OR for lap appy  -NPO, IVF, supportive care  -IV antibiotics started  - d/c home after likely    Surgical Team Contact Information  Spectralink: Ext: [phone] or [phone]  Pager: [phone]

## 2020-08-19 ENCOUNTER — TRANSCRIPTION ENCOUNTER (OUTPATIENT)
Age: 30
End: 2020-08-19

## 2020-08-19 VITALS
TEMPERATURE: 98 F | OXYGEN SATURATION: 95 % | SYSTOLIC BLOOD PRESSURE: 106 MMHG | HEART RATE: 90 BPM | DIASTOLIC BLOOD PRESSURE: 72 MMHG | RESPIRATION RATE: 18 BRPM

## 2020-08-19 LAB
ANION GAP SERPL CALC-SCNC: 8 MMOL/L — SIGNIFICANT CHANGE UP (ref 5–17)
BASOPHILS # BLD AUTO: 0.04 K/UL — SIGNIFICANT CHANGE UP (ref 0–0.2)
BASOPHILS NFR BLD AUTO: 0.3 % — SIGNIFICANT CHANGE UP (ref 0–2)
BUN SERPL-MCNC: 7 MG/DL — SIGNIFICANT CHANGE UP (ref 7–23)
CALCIUM SERPL-MCNC: 8.1 MG/DL — LOW (ref 8.5–10.1)
CHLORIDE SERPL-SCNC: 108 MMOL/L — SIGNIFICANT CHANGE UP (ref 96–108)
CO2 SERPL-SCNC: 24 MMOL/L — SIGNIFICANT CHANGE UP (ref 22–31)
CREAT SERPL-MCNC: 0.5 MG/DL — SIGNIFICANT CHANGE UP (ref 0.5–1.3)
CULTURE RESULTS: NO GROWTH — SIGNIFICANT CHANGE UP
EOSINOPHIL # BLD AUTO: 0.02 K/UL — SIGNIFICANT CHANGE UP (ref 0–0.5)
EOSINOPHIL NFR BLD AUTO: 0.2 % — SIGNIFICANT CHANGE UP (ref 0–6)
GLUCOSE SERPL-MCNC: 103 MG/DL — HIGH (ref 70–99)
HCT VFR BLD CALC: 29.6 % — LOW (ref 34.5–45)
HGB BLD-MCNC: 9.6 G/DL — LOW (ref 11.5–15.5)
IMM GRANULOCYTES NFR BLD AUTO: 0.7 % — SIGNIFICANT CHANGE UP (ref 0–1.5)
LYMPHOCYTES # BLD AUTO: 0.7 K/UL — LOW (ref 1–3.3)
LYMPHOCYTES # BLD AUTO: 5.7 % — LOW (ref 13–44)
MCHC RBC-ENTMCNC: 25.9 PG — LOW (ref 27–34)
MCHC RBC-ENTMCNC: 32.4 GM/DL — SIGNIFICANT CHANGE UP (ref 32–36)
MCV RBC AUTO: 80 FL — SIGNIFICANT CHANGE UP (ref 80–100)
MONOCYTES # BLD AUTO: 0.78 K/UL — SIGNIFICANT CHANGE UP (ref 0–0.9)
MONOCYTES NFR BLD AUTO: 6.4 % — SIGNIFICANT CHANGE UP (ref 2–14)
NEUTROPHILS # BLD AUTO: 10.56 K/UL — HIGH (ref 1.8–7.4)
NEUTROPHILS NFR BLD AUTO: 86.7 % — HIGH (ref 43–77)
NRBC # BLD: 0 /100 WBCS — SIGNIFICANT CHANGE UP (ref 0–0)
PLATELET # BLD AUTO: 306 K/UL — SIGNIFICANT CHANGE UP (ref 150–400)
POTASSIUM SERPL-MCNC: 3.3 MMOL/L — LOW (ref 3.5–5.3)
POTASSIUM SERPL-SCNC: 3.3 MMOL/L — LOW (ref 3.5–5.3)
RBC # BLD: 3.7 M/UL — LOW (ref 3.8–5.2)
RBC # FLD: 13.5 % — SIGNIFICANT CHANGE UP (ref 10.3–14.5)
SARS-COV-2 IGG SERPL QL IA: NEGATIVE — SIGNIFICANT CHANGE UP
SARS-COV-2 IGM SERPL IA-ACNC: 0.08 INDEX — SIGNIFICANT CHANGE UP
SODIUM SERPL-SCNC: 140 MMOL/L — SIGNIFICANT CHANGE UP (ref 135–145)
SPECIMEN SOURCE: SIGNIFICANT CHANGE UP
WBC # BLD: 12.18 K/UL — HIGH (ref 3.8–10.5)
WBC # FLD AUTO: 12.18 K/UL — HIGH (ref 3.8–10.5)

## 2020-08-19 PROCEDURE — 85027 COMPLETE CBC AUTOMATED: CPT

## 2020-08-19 PROCEDURE — 85730 THROMBOPLASTIN TIME PARTIAL: CPT

## 2020-08-19 PROCEDURE — 96361 HYDRATE IV INFUSION ADD-ON: CPT

## 2020-08-19 PROCEDURE — 88304 TISSUE EXAM BY PATHOLOGIST: CPT

## 2020-08-19 PROCEDURE — 86900 BLOOD TYPING SEROLOGIC ABO: CPT

## 2020-08-19 PROCEDURE — 86769 SARS-COV-2 COVID-19 ANTIBODY: CPT

## 2020-08-19 PROCEDURE — 85610 PROTHROMBIN TIME: CPT

## 2020-08-19 PROCEDURE — 99285 EMERGENCY DEPT VISIT HI MDM: CPT

## 2020-08-19 PROCEDURE — 36415 COLL VENOUS BLD VENIPUNCTURE: CPT

## 2020-08-19 PROCEDURE — 81003 URINALYSIS AUTO W/O SCOPE: CPT

## 2020-08-19 PROCEDURE — 80048 BASIC METABOLIC PNL TOTAL CA: CPT

## 2020-08-19 PROCEDURE — C1889: CPT

## 2020-08-19 PROCEDURE — 86850 RBC ANTIBODY SCREEN: CPT

## 2020-08-19 PROCEDURE — 84702 CHORIONIC GONADOTROPIN TEST: CPT

## 2020-08-19 PROCEDURE — 87635 SARS-COV-2 COVID-19 AMP PRB: CPT

## 2020-08-19 PROCEDURE — 86901 BLOOD TYPING SEROLOGIC RH(D): CPT

## 2020-08-19 PROCEDURE — 87086 URINE CULTURE/COLONY COUNT: CPT

## 2020-08-19 PROCEDURE — 80053 COMPREHEN METABOLIC PANEL: CPT

## 2020-08-19 PROCEDURE — 96374 THER/PROPH/DIAG INJ IV PUSH: CPT

## 2020-08-19 PROCEDURE — 96375 TX/PRO/DX INJ NEW DRUG ADDON: CPT

## 2020-08-19 PROCEDURE — 76830 TRANSVAGINAL US NON-OB: CPT

## 2020-08-19 PROCEDURE — 74177 CT ABD & PELVIS W/CONTRAST: CPT

## 2020-08-19 PROCEDURE — 99024 POSTOP FOLLOW-UP VISIT: CPT

## 2020-08-19 PROCEDURE — 83690 ASSAY OF LIPASE: CPT

## 2020-08-19 RX ORDER — LEVOTHYROXINE SODIUM 125 MCG
1 TABLET ORAL
Qty: 0 | Refills: 0 | DISCHARGE
Start: 2020-08-19

## 2020-08-19 RX ORDER — LEVOTHYROXINE SODIUM 125 MCG
1 TABLET ORAL
Qty: 0 | Refills: 0 | DISCHARGE

## 2020-08-19 RX ORDER — OXYCODONE AND ACETAMINOPHEN 5; 325 MG/1; MG/1
1 TABLET ORAL
Qty: 12 | Refills: 0
Start: 2020-08-19

## 2020-08-19 RX ORDER — METRONIDAZOLE 500 MG
1 TABLET ORAL
Qty: 15 | Refills: 0
Start: 2020-08-19 | End: 2020-08-23

## 2020-08-19 RX ORDER — MOXIFLOXACIN HYDROCHLORIDE TABLETS, 400 MG 400 MG/1
1 TABLET, FILM COATED ORAL
Qty: 10 | Refills: 0
Start: 2020-08-19 | End: 2020-08-23

## 2020-08-19 RX ADMIN — Medication 100 MILLIGRAM(S): at 05:31

## 2020-08-19 RX ADMIN — Medication 1000 MILLIGRAM(S): at 05:32

## 2020-08-19 RX ADMIN — Medication 137 MICROGRAM(S): at 05:32

## 2020-08-19 RX ADMIN — Medication 1000 MILLIGRAM(S): at 06:32

## 2020-08-19 RX ADMIN — PANTOPRAZOLE SODIUM 40 MILLIGRAM(S): 20 TABLET, DELAYED RELEASE ORAL at 05:32

## 2020-08-19 RX ADMIN — Medication 1000 MILLIGRAM(S): at 12:01

## 2020-08-19 RX ADMIN — Medication 1000 MILLIGRAM(S): at 00:13

## 2020-08-19 RX ADMIN — Medication 200 MILLIGRAM(S): at 11:59

## 2020-08-19 RX ADMIN — Medication 200 MILLIGRAM(S): at 02:36

## 2020-08-19 RX ADMIN — Medication 1000 MILLIGRAM(S): at 01:13

## 2020-08-19 RX ADMIN — SERTRALINE 50 MILLIGRAM(S): 25 TABLET, FILM COATED ORAL at 11:59

## 2020-08-19 NOTE — SBIRT NOTE ADULT - NSSBIRTALCNOACTINTDET_GEN_A_CORE
SW reviewed medical record and there was no indication of alcohol or drug use. SW checked patient profile and H&P

## 2020-08-19 NOTE — DISCHARGE NOTE PROVIDER - HOSPITAL COURSE
31 yo female with acute appendicitis presented to ED on 8/18. patient underwent a laparoscopic appendectomy with no intra/post op complications     patient ambulating well , tolerating po, for discharge and Follow up.

## 2020-08-19 NOTE — DISCHARGE NOTE NURSING/CASE MANAGEMENT/SOCIAL WORK - PATIENT PORTAL LINK FT
You can access the FollowMyHealth Patient Portal offered by Health system by registering at the following website: [email or web address]. By joining [facility]’s FollowMyHealth portal, you will also be able to view your health information using other applications (apps) compatible with our system.

## 2020-08-19 NOTE — DISCHARGE NOTE PROVIDER - NSDCMRMEDTOKEN_GEN_ALL_CORE_FT
felodipine 10 mg oral tablet, extended release: 1 tab(s) orally once a day  hydroxychloroquine 200 mg oral tablet: 1 tab(s) orally once a day  Multiple Vitamins oral tablet: 1 tab(s) orally once a day  omeprazole 40 mg oral delayed release capsule: 1 cap(s) orally once a day  Percocet 5 mg-325 mg oral tablet: 1 tab(s) orally every 6 hours, As Needed MDD:4   sertraline 50 mg oral tablet: 1 tab(s) orally once a day

## 2020-08-19 NOTE — PROGRESS NOTE ADULT - SUBJECTIVE AND OBJECTIVE BOX
Patient seen and examined bedside resting comfortably.No complaints offered.   Abdominal pain is well controlled.Denies nausea and vomiting. Tolerating diet.  No flatus/BM. Denies chest pain, dyspnea, cough.    T(F): 98.4 (08-19-20 @ 05:13), Max: 100.9 (08-18-20 @ 16:58)  HR: 90 (08-19-20 @ 05:13) (90 - 112)  BP: 106/72 (08-19-20 @ 05:13) (101/71 - 157/92)  RR: 18 (08-19-20 @ 05:13) (15 - 20)  SpO2: 95% (08-19-20 @ 05:13) (94% - 100%)  Wt(kg): --  CAPILLARY BLOOD GLUCOSE          PHYSICAL EXAM:  General: NAD, WDWN.   Neuro:  Alert & oriented x 3  CV: +S1+S2 regular rate and rhythm  Lung: clear to ausculation bilaterally, respirations nonlabored, good inspiratory effort  Abdomen: soft, mild incisional tenderness ND.+ve  BS  Extremities: no pedal edema or calf tenderness noted       LABS:                        9.6    12.18 )-----------( 306      ( 19 Aug 2020 07:16 )             29.6     08-19    140  |  108  |  7   ----------------------------<  103<H>  3.3<L>   |  24  |  0.50    Ca    8.1<L>      19 Aug 2020 07:16    TPro  8.6<H>  /  Alb  3.8  /  TBili  0.8  /  DBili  x   /  AST  33  /  ALT  38  /  AlkPhos  86  08-18    PT/INR - ( 18 Aug 2020 12:32 )   PT: 13.9 sec;   INR: 1.20 ratio         PTT - ( 18 Aug 2020 12:32 )  PTT:28.4 sec  I&O's Detail    18 Aug 2020 07:01  -  19 Aug 2020 07:00  --------------------------------------------------------  IN:    sodium chloride 0.9%.: 750 mL    Solution: 200 mL    Solution: 100 mL  Total IN: 1050 mL    OUT:    Voided: 1250 mL  Total OUT: 1250 mL    Total NET: -200 mL          Impression: 30y Female admitted with Appendicitis    PMH Anxiety  GERD (gastroesophageal reflux disease)  Hypothyroid  Mixed connective tissue disease      Plan:  -for discharge today

## 2020-08-19 NOTE — PROGRESS NOTE ADULT - SUBJECTIVE AND OBJECTIVE BOX
POD # 1 s/p appendectomy, perforated appendicitis.    Vital Signs Last 24 Hrs  T(C): 36.9 (19 Aug 2020 05:13), Max: 38.3 (18 Aug 2020 16:58)  T(F): 98.4 (19 Aug 2020 05:13), Max: 100.9 (18 Aug 2020 16:58)  HR: 90 (19 Aug 2020 05:13) (90 - 112)  BP: 106/72 (19 Aug 2020 05:13) (101/71 - 157/92)  BP(mean): --  RR: 18 (19 Aug 2020 05:13) (15 - 20)  SpO2: 95% (19 Aug 2020 05:13) (94% - 100%)    08-18 @ 07:01  -  08-19 @ 07:00  --------------------------------------------------------  IN: 1050 mL / OUT: 1250 mL / NET: -200 mL                              9.6    12.18 )-----------( 306      ( 19 Aug 2020 07:16 )             29.6                         11.2   19.00 )-----------( 390      ( 18 Aug 2020 12:32 )             34.3       08-19    140  |  108  |  7   ----------------------------<  103<H>  3.3<L>   |  24  |  0.50    Ca    8.1<L>      19 Aug 2020 07:16    TPro  8.6<H>  /  Alb  3.8  /  TBili  0.8  /  DBili  x   /  AST  33  /  ALT  38  /  AlkPhos  86  08-18      Physical Exam:  Awake alert and oriented x3 in no acute distress. NCAT, PERRLA, EOMI  Lungs clear bilaterally without wheezes rhonchi or rales.  Regular rate and rhythm  Abdomen soft, nontender, nondistended, positive bowel sounds in all 4 quadrants. No evidence of hernia or masses. Surgical incisions remained well approximated and healing appropriately without erythema, induration or fluctuance. Dressings remained clean dry and intact  Extremities without edema or tenderness.

## 2020-08-19 NOTE — DISCHARGE NOTE PROVIDER - CARE PROVIDERS DIRECT ADDRESSES
[email or web address]

## 2020-08-19 NOTE — DISCHARGE NOTE PROVIDER - NSDCACTIVITY_GEN_ALL_CORE
Showering allowed/Walking - Indoors allowed/No heavy lifting/straining/Do not make important decisions/Do not drive or operate machinery/Walking - Outdoors allowed

## 2020-08-19 NOTE — DISCHARGE NOTE PROVIDER - CARE PROVIDER_API CALL
Christian Leigh  SURGERY  [address] [place] [address]  Phone: [phone]  Fax: [phone]  Follow Up Time: 1 week

## 2020-08-19 NOTE — PROGRESS NOTE ADULT - ASSESSMENT
POD 1 s/p Lap Appendectomy, perforated retrocecal appendicitis.  Doing well this morning.  Tolerating diet.  Remains afebrile.  Surgical incisions remain clean and dry.  Postoperative instructions have been provided including avoidance of heavy lifting and strenuous activities in excess of 20-25 pounds for duration of 4-6 weeks. The patient may shower keeping the incisions clean, dry, covered as needed.  Discharge to home today.  f/u with me in one week.

## 2020-08-20 DIAGNOSIS — Z87.19 PERSONAL HISTORY OF OTHER DISEASES OF THE DIGESTIVE SYSTEM: ICD-10-CM

## 2020-08-21 PROBLEM — F41.9 ANXIETY DISORDER, UNSPECIFIED: Chronic | Status: ACTIVE | Noted: 2020-08-18

## 2020-08-25 ENCOUNTER — APPOINTMENT (OUTPATIENT)
Dept: SURGERY | Facility: CLINIC | Age: 30
End: 2020-08-25
Payer: COMMERCIAL

## 2020-08-25 VITALS
BODY MASS INDEX: 24.84 KG/M2 | WEIGHT: 135 LBS | OXYGEN SATURATION: 97 % | HEIGHT: 62 IN | DIASTOLIC BLOOD PRESSURE: 82 MMHG | SYSTOLIC BLOOD PRESSURE: 125 MMHG | HEART RATE: 124 BPM

## 2020-08-25 DIAGNOSIS — K35.33 ACUTE APPENDICITIS W/ PERFORATION AND LOCALIZED PERITONITIS, W/ABSCESS: ICD-10-CM

## 2020-08-25 PROCEDURE — 99024 POSTOP FOLLOW-UP VISIT: CPT

## 2020-08-28 PROBLEM — K35.33 ACUTE APPENDICITIS WITH PERFORATION, LOCALIZED PERITONITIS, AND ABSCESS, WITHOUT GANGRENE: Status: ACTIVE | Noted: 2020-08-28

## 2020-08-28 NOTE — PHYSICAL EXAM
[Normal Breath Sounds] : Normal breath sounds [Normal Heart Sounds] : normal heart sounds [Normal Rate and Rhythm] : normal rate and rhythm [Alert] : alert [Oriented to Place] : oriented to place [Oriented to Person] : oriented to person [Oriented to Time] : oriented to time [Calm] : calm [de-identified] : Soft, nontender nondistended, positive bowel sounds in all four quads.  No hernia or masses. No rebound or guarding.\par Surgical incisions healing well.  No signs of infection. [de-identified] : LIZETTE PERRY EOMI [de-identified] : Healthy appearing young woman in no distress [de-identified] : Consistent with Raynaud's. [de-identified] : Ambulating without difficulty or assistance

## 2020-08-28 NOTE — ASSESSMENT
[FreeTextEntry1] : post op lap appy.\par Path reviewed and discussed with patient.  Marked acute suppurative and necrotizing appendicitis and periappendicitis with abscess formation and focal perforation.\par Postoperative instructions have been provided including avoidance of heavy lifting and strenuous activities in excess of 20-25 pounds for duration of 4-6 weeks. The patient may shower keeping the incisions clean, dry, covered as needed.\par \par f/u PRN.\par

## 2020-09-08 ENCOUNTER — TRANSCRIPTION ENCOUNTER (OUTPATIENT)
Age: 30
End: 2020-09-08

## 2020-10-02 ENCOUNTER — APPOINTMENT (OUTPATIENT)
Dept: GASTROENTEROLOGY | Facility: CLINIC | Age: 30
End: 2020-10-02
Payer: COMMERCIAL

## 2020-10-02 VITALS
BODY MASS INDEX: 24.29 KG/M2 | HEIGHT: 62 IN | HEART RATE: 104 BPM | WEIGHT: 132 LBS | OXYGEN SATURATION: 98 % | SYSTOLIC BLOOD PRESSURE: 136 MMHG | DIASTOLIC BLOOD PRESSURE: 90 MMHG

## 2020-10-02 PROCEDURE — 99204 OFFICE O/P NEW MOD 45 MIN: CPT

## 2020-10-05 NOTE — ASSESSMENT
[FreeTextEntry1] : IMPRESSION: \par 1.  Reflux symptoms\par 2.  Mixed connective tissue disease/Limited scleroderma \par \par \par \par PLAN: \par 1.  I will plan for an upper endoscopy under monitored anesthesia care to rule out ulcerative esophagitis, peptic ulcer disease, H.pylori gastritis etc.   Risks, benefits, and alternatives of the procedure were discussed with the patient. Patient understands and agrees to proceed with the planned procedure.\par 2.  Manometry study eventually\par 3.  Long term side effects of PPI therapy (omeprazole) discussed - counselled on GERD diet.  Trial of Pepcid.

## 2020-10-05 NOTE — HISTORY OF PRESENT ILLNESS
[de-identified] : 31 y/o woman with Hx of appendicitis, s/p appendectomy on Aug 18, 2020 who presents with complaints of reflux.  \par \par She has a burning sensation in chest - feels mucous build up in chest.  She coughs on occasion.  She gets heartburn a few times a week.   She noticed heartburn since 4 or 5 years ago.  She has been taking omeprazole since 4 to 5 years, daily.  Heart burn better with omeprazole.  \par \par Has dry mouth.  Has a hoarse voice sometimes.  \par \par Denies clearing of the throat.  \par \par No abdominal pain.  \par No nausea, no vomiting.    \par No dysphagia, no odynophagia.  \par \par No loss of appetite, no weight loss.  \par \par No melena and no hematochezia.  \par \par Used to have diarrhea but 2 years ago after changing time of meds, and taking probiotic her diarrhea resolved.   Stools are regular.  \par \par Never had an upper endoscopy.  \par \par Mom has heartburn.  Maternal grandfather had stomach which may have come from colon cancer - older than 60.  Patient denies family Hx of GI cancers.\par \par She is a speech pathologist.  \par \par All other review of systems are negative.  Denies cardiac symptoms.

## 2020-10-05 NOTE — PHYSICAL EXAM
[Abdomen Soft] : soft [General Appearance - Alert] : alert [General Appearance - In No Acute Distress] : in no acute distress [Sclera] : the sclera and conjunctiva were normal [Extraocular Movements] : extraocular movements were intact [Outer Ear] : the ears and nose were normal in appearance [Hearing Threshold Finger Rub Not [place]] : hearing was normal [Neck Appearance] : the appearance of the neck was normal [Neck Cervical Mass (___cm)] : no neck mass was observed [Respiration, Rhythm And Depth] : normal respiratory rhythm and effort [Exaggerated Use Of Accessory Muscles For Inspiration] : no accessory muscle use [Heart Rate And Rhythm] : heart rate was normal and rhythm regular [Heart Sounds] : normal S1 and S2 [Heart Sounds Gallop] : no gallops [Murmurs] : no murmurs [Heart Sounds Pericardial Friction Rub] : no pericardial rub [Bowel Sounds] : normal bowel sounds [Abdomen Tenderness] : non-tender [Cervical Lymph Nodes Enlarged Posterior Bilaterally] : posterior cervical [Cervical Lymph Nodes Enlarged Anterior Bilaterally] : anterior cervical [Supraclavicular Lymph Nodes Enlarged Bilaterally] : supraclavicular [Abnormal Walk] : normal gait [Nail Clubbing] : no clubbing  or cyanosis of the fingernails [Skin Color & Pigmentation] : normal skin color and pigmentation [] : no rash [Oriented To Time, Place, And Person] : oriented to person, place, and time [Impaired Insight] : insight and judgment were intact [Affect] : the affect was normal [FreeTextEntry1] : three small surgical scar

## 2020-11-06 NOTE — ED PROVIDER NOTE - SKIN, MLM
Oriented - self; Oriented - place; Oriented - time
Skin normal color for race, warm, dry and intact. No evidence of rash.

## 2020-12-03 DIAGNOSIS — Z01.818 ENCOUNTER FOR OTHER PREPROCEDURAL EXAMINATION: ICD-10-CM

## 2020-12-07 ENCOUNTER — TRANSCRIPTION ENCOUNTER (OUTPATIENT)
Age: 30
End: 2020-12-07

## 2020-12-08 ENCOUNTER — APPOINTMENT (OUTPATIENT)
Dept: DISASTER EMERGENCY | Facility: CLINIC | Age: 30
End: 2020-12-08

## 2020-12-10 ENCOUNTER — TRANSCRIPTION ENCOUNTER (OUTPATIENT)
Age: 30
End: 2020-12-10

## 2020-12-10 LAB — SARS-COV-2 N GENE NPH QL NAA+PROBE: NOT DETECTED

## 2020-12-11 ENCOUNTER — APPOINTMENT (OUTPATIENT)
Dept: GASTROENTEROLOGY | Facility: HOSPITAL | Age: 30
End: 2020-12-11

## 2020-12-11 ENCOUNTER — OUTPATIENT (OUTPATIENT)
Dept: OUTPATIENT SERVICES | Facility: HOSPITAL | Age: 30
LOS: 1 days | End: 2020-12-11
Payer: COMMERCIAL

## 2020-12-11 ENCOUNTER — RESULT REVIEW (OUTPATIENT)
Age: 30
End: 2020-12-11

## 2020-12-11 DIAGNOSIS — K21.9 GASTRO-ESOPHAGEAL REFLUX DISEASE WITHOUT ESOPHAGITIS: ICD-10-CM

## 2020-12-11 LAB — HCG UR QL: NEGATIVE — SIGNIFICANT CHANGE UP

## 2020-12-11 PROCEDURE — 88313 SPECIAL STAINS GROUP 2: CPT

## 2020-12-11 PROCEDURE — 88305 TISSUE EXAM BY PATHOLOGIST: CPT

## 2020-12-11 PROCEDURE — 88312 SPECIAL STAINS GROUP 1: CPT | Mod: 26

## 2020-12-11 PROCEDURE — 88312 SPECIAL STAINS GROUP 1: CPT

## 2020-12-11 PROCEDURE — 88305 TISSUE EXAM BY PATHOLOGIST: CPT | Mod: 26

## 2020-12-11 PROCEDURE — 81025 URINE PREGNANCY TEST: CPT

## 2020-12-11 PROCEDURE — 43239 EGD BIOPSY SINGLE/MULTIPLE: CPT

## 2020-12-11 PROCEDURE — 88313 SPECIAL STAINS GROUP 2: CPT | Mod: 26

## 2020-12-15 ENCOUNTER — NON-APPOINTMENT (OUTPATIENT)
Age: 30
End: 2020-12-15

## 2020-12-16 ENCOUNTER — NON-APPOINTMENT (OUTPATIENT)
Age: 30
End: 2020-12-16

## 2020-12-17 ENCOUNTER — NON-APPOINTMENT (OUTPATIENT)
Age: 30
End: 2020-12-17

## 2020-12-21 ENCOUNTER — NON-APPOINTMENT (OUTPATIENT)
Age: 30
End: 2020-12-21

## 2020-12-22 DIAGNOSIS — A04.8 OTHER SPECIFIED BACTERIAL INTESTINAL INFECTIONS: ICD-10-CM

## 2020-12-24 ENCOUNTER — TRANSCRIPTION ENCOUNTER (OUTPATIENT)
Age: 30
End: 2020-12-24

## 2021-01-08 ENCOUNTER — APPOINTMENT (OUTPATIENT)
Dept: INTERNAL MEDICINE | Facility: CLINIC | Age: 31
End: 2021-01-08
Payer: COMMERCIAL

## 2021-01-08 VITALS
TEMPERATURE: 97.9 F | WEIGHT: 130 LBS | OXYGEN SATURATION: 97 % | SYSTOLIC BLOOD PRESSURE: 110 MMHG | HEART RATE: 40 BPM | BODY MASS INDEX: 23.92 KG/M2 | HEIGHT: 62 IN | DIASTOLIC BLOOD PRESSURE: 70 MMHG

## 2021-01-08 DIAGNOSIS — Z11.1 ENCOUNTER FOR SCREENING FOR RESPIRATORY TUBERCULOSIS: ICD-10-CM

## 2021-01-08 PROCEDURE — 36415 COLL VENOUS BLD VENIPUNCTURE: CPT

## 2021-01-08 PROCEDURE — 99395 PREV VISIT EST AGE 18-39: CPT | Mod: 25

## 2021-01-08 PROCEDURE — 99072 ADDL SUPL MATRL&STAF TM PHE: CPT

## 2021-01-08 NOTE — HISTORY OF PRESENT ILLNESS
[FreeTextEntry1] : 31 y/o female present today for a physical exam with fasting labs.  [de-identified] : She presents for complete physical exam and fasting blood work\par Since seeing patient last she has had an appendectomy done was diagnosed with H. pylori and has completed treatment\par She is waiting to go for urea breath test to make sure her H. pylori is eradicated\par She has been doing well with muscle and joint pains\par Raynaud's disease is doing well

## 2021-01-08 NOTE — PLAN
[FreeTextEntry1] : I recommend patient seeing [facility] for consult rheumatology as [place] location is closer for her\par Patient will go for urea breath test for further evaluation to make sure H. pylori is eradicated\par Continue with healthy diet further improvement with exercise\par Seen blood work done in office today for further evaluation\par She will be advised of blood work results once reviewed

## 2021-01-10 ENCOUNTER — NON-APPOINTMENT (OUTPATIENT)
Age: 31
End: 2021-01-10

## 2021-01-11 ENCOUNTER — TRANSCRIPTION ENCOUNTER (OUTPATIENT)
Age: 31
End: 2021-01-11

## 2021-01-13 ENCOUNTER — NON-APPOINTMENT (OUTPATIENT)
Age: 31
End: 2021-01-13

## 2021-01-20 ENCOUNTER — TRANSCRIPTION ENCOUNTER (OUTPATIENT)
Age: 31
End: 2021-01-20

## 2021-01-20 LAB
ALBUMIN SERPL ELPH-MCNC: 4.3 G/DL
ALP BLD-CCNC: 452 U/L
ALT SERPL-CCNC: 965 U/L
ANION GAP SERPL CALC-SCNC: 24 MMOL/L
APPEARANCE: ABNORMAL
AST SERPL-CCNC: 614 U/L
BACTERIA: NEGATIVE
BASOPHILS # BLD AUTO: 0.03 K/UL
BASOPHILS NFR BLD AUTO: 0.6 %
BILIRUB SERPL-MCNC: 1 MG/DL
BILIRUBIN URINE: ABNORMAL
BLOOD URINE: NEGATIVE
BUN SERPL-MCNC: 8 MG/DL
CALCIUM SERPL-MCNC: 9.6 MG/DL
CHLORIDE SERPL-SCNC: 102 MMOL/L
CHOLEST SERPL-MCNC: 169 MG/DL
CO2 SERPL-SCNC: 14 MMOL/L
COLOR: YELLOW
CREAT SERPL-MCNC: 0.66 MG/DL
EOSINOPHIL # BLD AUTO: 0.29 K/UL
EOSINOPHIL NFR BLD AUTO: 5.7 %
ESTIMATED AVERAGE GLUCOSE: 103 MG/DL
GLUCOSE QUALITATIVE U: NEGATIVE
GLUCOSE SERPL-MCNC: 93 MG/DL
HBA1C MFR BLD HPLC: 5.2 %
HCT VFR BLD CALC: 43.8 %
HDLC SERPL-MCNC: 33 MG/DL
HGB BLD-MCNC: 13.8 G/DL
HYALINE CASTS: 0 /LPF
IMM GRANULOCYTES NFR BLD AUTO: 0.2 %
KETONES URINE: NEGATIVE
LDLC SERPL CALC-MCNC: 96 MG/DL
LEUKOCYTE ESTERASE URINE: NEGATIVE
LYMPHOCYTES # BLD AUTO: 2.2 K/UL
LYMPHOCYTES NFR BLD AUTO: 43.6 %
M TB IFN-G BLD-IMP: NEGATIVE
MAN DIFF?: NORMAL
MCHC RBC-ENTMCNC: 26.3 PG
MCHC RBC-ENTMCNC: 31.5 GM/DL
MCV RBC AUTO: 83.6 FL
MICROSCOPIC-UA: NORMAL
MONOCYTES # BLD AUTO: 0.41 K/UL
MONOCYTES NFR BLD AUTO: 8.1 %
NEUTROPHILS # BLD AUTO: 2.11 K/UL
NEUTROPHILS NFR BLD AUTO: 41.8 %
NITRITE URINE: NEGATIVE
NONHDLC SERPL-MCNC: 136 MG/DL
PH URINE: 6.5
PLATELET # BLD AUTO: 164 K/UL
POTASSIUM SERPL-SCNC: 4.4 MMOL/L
PROT SERPL-MCNC: 7.9 G/DL
PROTEIN URINE: NORMAL
QUANTIFERON TB PLUS MITOGEN MINUS NIL: 8.28 IU/ML
QUANTIFERON TB PLUS NIL: 0.52 IU/ML
QUANTIFERON TB PLUS TB1 MINUS NIL: -0.01 IU/ML
QUANTIFERON TB PLUS TB2 MINUS NIL: -0.03 IU/ML
RBC # BLD: 5.24 M/UL
RBC # FLD: 14.1 %
RED BLOOD CELLS URINE: 4 /HPF
SODIUM SERPL-SCNC: 140 MMOL/L
SPECIFIC GRAVITY URINE: 1.02
SQUAMOUS EPITHELIAL CELLS: 5 /HPF
T3FREE SERPL-MCNC: 1.57 PG/ML
T4 FREE SERPL-MCNC: 1.6 NG/DL
TRIGL SERPL-MCNC: 202 MG/DL
TSH SERPL-ACNC: 0.94 UIU/ML
URINE COMMENTS: NORMAL
UROBILINOGEN URINE: NORMAL
VIT B12 SERPL-MCNC: 1813 PG/ML
WBC # FLD AUTO: 5.05 K/UL
WHITE BLOOD CELLS URINE: 3 /HPF

## 2021-01-28 ENCOUNTER — TRANSCRIPTION ENCOUNTER (OUTPATIENT)
Age: 31
End: 2021-01-28

## 2021-01-28 LAB
ALBUMIN SERPL ELPH-MCNC: 4.8 G/DL
ALP BLD-CCNC: 193 U/L
ALT SERPL-CCNC: 51 U/L
ANION GAP SERPL CALC-SCNC: 15 MMOL/L
AST SERPL-CCNC: 36 U/L
BILIRUB SERPL-MCNC: 0.4 MG/DL
BUN SERPL-MCNC: 13 MG/DL
CALCIUM SERPL-MCNC: 10.2 MG/DL
CHLORIDE SERPL-SCNC: 98 MMOL/L
CO2 SERPL-SCNC: 24 MMOL/L
CREAT SERPL-MCNC: 0.64 MG/DL
GLUCOSE SERPL-MCNC: 83 MG/DL
HAV IGM SER QL: NONREACTIVE
HBV CORE IGM SER QL: NONREACTIVE
HBV SURFACE AG SER QL: NONREACTIVE
HCV AB SER QL: NONREACTIVE
HCV S/CO RATIO: 0.09 S/CO
POTASSIUM SERPL-SCNC: 4.2 MMOL/L
PROT SERPL-MCNC: 8.5 G/DL
SODIUM SERPL-SCNC: 136 MMOL/L

## 2021-02-22 NOTE — ED PROVIDER NOTE - LATERALITY
right O-L Flap Text: The defect edges were debeveled with a #15 scalpel blade.  Given the location of the defect, shape of the defect and the proximity to free margins an O-L flap was deemed most appropriate.  Using a sterile surgical marker, an appropriate advancement flap was drawn incorporating the defect and placing the expected incisions within the relaxed skin tension lines where possible.    The area thus outlined was incised deep to adipose tissue with a #15 scalpel blade.  The skin margins were undermined to an appropriate distance in all directions utilizing iris scissors.

## 2021-03-05 ENCOUNTER — APPOINTMENT (OUTPATIENT)
Dept: RHEUMATOLOGY | Facility: CLINIC | Age: 31
End: 2021-03-05
Payer: COMMERCIAL

## 2021-03-05 VITALS
HEIGHT: 62 IN | TEMPERATURE: 98.1 F | BODY MASS INDEX: 25.03 KG/M2 | SYSTOLIC BLOOD PRESSURE: 138 MMHG | DIASTOLIC BLOOD PRESSURE: 90 MMHG | OXYGEN SATURATION: 90 % | WEIGHT: 136 LBS | HEART RATE: 76 BPM

## 2021-03-05 PROCEDURE — 99215 OFFICE O/P EST HI 40 MIN: CPT

## 2021-03-05 PROCEDURE — 99072 ADDL SUPL MATRL&STAF TM PHE: CPT

## 2021-03-05 RX ORDER — BISMUTH SUBSALICYLATE 262 MG/1
262 TABLET, CHEWABLE ORAL 4 TIMES DAILY
Qty: 112 | Refills: 0 | Status: DISCONTINUED | COMMUNITY
Start: 2020-12-21 | End: 2021-03-05

## 2021-03-05 RX ORDER — CLARITHROMYCIN 500 MG/1
500 TABLET, FILM COATED ORAL TWICE DAILY
Qty: 28 | Refills: 0 | Status: DISCONTINUED | COMMUNITY
Start: 2020-12-18 | End: 2021-03-05

## 2021-03-05 RX ORDER — PANTOPRAZOLE 40 MG/1
40 TABLET, DELAYED RELEASE ORAL
Qty: 28 | Refills: 0 | Status: DISCONTINUED | COMMUNITY
Start: 2020-12-18 | End: 2021-03-05

## 2021-03-05 RX ORDER — TETRACYCLINE HYDROCHLORIDE 500 MG/1
500 CAPSULE ORAL
Qty: 56 | Refills: 0 | Status: DISCONTINUED | COMMUNITY
Start: 2020-12-21 | End: 2021-03-05

## 2021-03-05 RX ORDER — PANTOPRAZOLE 40 MG/1
40 TABLET, DELAYED RELEASE ORAL TWICE DAILY
Qty: 28 | Refills: 0 | Status: DISCONTINUED | COMMUNITY
Start: 2020-12-17 | End: 2021-03-05

## 2021-03-05 RX ORDER — OMEPRAZOLE 20 MG/1
20 CAPSULE, DELAYED RELEASE ORAL TWICE DAILY
Qty: 28 | Refills: 0 | Status: DISCONTINUED | COMMUNITY
Start: 2020-12-21 | End: 2021-03-05

## 2021-03-05 RX ORDER — DOXYCYCLINE 100 MG/1
100 TABLET, FILM COATED ORAL
Qty: 28 | Refills: 0 | Status: DISCONTINUED | COMMUNITY
Start: 2020-12-22 | End: 2021-03-05

## 2021-03-05 RX ORDER — METRONIDAZOLE 500 MG/1
500 TABLET ORAL
Qty: 42 | Refills: 0 | Status: DISCONTINUED | COMMUNITY
Start: 2020-12-18 | End: 2021-03-05

## 2021-03-05 RX ORDER — METRONIDAZOLE 500 MG/1
500 TABLET ORAL
Qty: 42 | Refills: 0 | Status: DISCONTINUED | COMMUNITY
Start: 2020-12-17 | End: 2021-03-05

## 2021-03-05 RX ORDER — CLARITHROMYCIN 500 MG/1
500 TABLET, FILM COATED ORAL TWICE DAILY
Qty: 28 | Refills: 0 | Status: DISCONTINUED | COMMUNITY
Start: 2020-12-17 | End: 2021-03-05

## 2021-03-05 RX ORDER — METRONIDAZOLE 500 MG/1
500 TABLET ORAL
Qty: 42 | Refills: 0 | Status: DISCONTINUED | COMMUNITY
Start: 2020-12-21 | End: 2021-03-05

## 2021-03-10 NOTE — PHYSICAL EXAM
[General Appearance - Alert] : alert [General Appearance - In No Acute Distress] : in no acute distress [General Appearance - Well Nourished] : well nourished [General Appearance - Well Developed] : well developed [General Appearance - Well-Appearing] : healthy appearing [Sclera] : the sclera and conjunctiva were normal [Extraocular Movements] : extraocular movements were intact [Strabismus] : no strabismus was seen [Outer Ear] : the ears and nose were normal in appearance [Examination Of The Oral Cavity] : the lips and gums were normal [Nasal Cavity] : the nasal mucosa and septum were normal [Oropharynx] : the oropharynx was normal [Neck Appearance] : the appearance of the neck was normal [Neck Cervical Mass (___cm)] : no neck mass was observed [Jugular Venous Distention Increased] : there was no jugular-venous distention [Thyroid Diffuse Enlargement] : the thyroid was not enlarged [Respiration, Rhythm And Depth] : normal respiratory rhythm and effort [Exaggerated Use Of Accessory Muscles For Inspiration] : no accessory muscle use [Auscultation Breath Sounds / Voice Sounds] : lungs were clear to auscultation bilaterally [Heart Rate And Rhythm] : heart rate was normal and rhythm regular [Heart Sounds] : normal S1 and S2 [Heart Sounds Gallop] : no gallops [Murmurs] : no murmurs [Heart Sounds Pericardial Friction Rub] : no pericardial rub [Arterial Pulses Carotid] : carotid pulses were normal with no bruits [Full Pulse] : the pedal pulses are present [Edema] : there was no peripheral edema [Veins - Varicosity Changes] : there were no varicosital changes [Bowel Sounds] : normal bowel sounds [Abdomen Soft] : soft [Abdomen Tenderness] : non-tender [Abdomen Mass (___ Cm)] : no abdominal mass palpated [Cervical Lymph Nodes Enlarged Posterior Bilaterally] : posterior cervical [Cervical Lymph Nodes Enlarged Anterior Bilaterally] : anterior cervical [Supraclavicular Lymph Nodes Enlarged Bilaterally] : supraclavicular [No CVA Tenderness] : no ~M costovertebral angle tenderness [No Spinal Tenderness] : no spinal tenderness [Abnormal Walk] : normal gait [Nail Clubbing] : no clubbing  or cyanosis of the fingernails [Musculoskeletal - Swelling] : no joint swelling seen [Motor Tone] : muscle strength and tone were normal [Skin Color & Pigmentation] : normal skin color and pigmentation [Skin Turgor] : normal skin turgor [] : no rash [Sensation] : the sensory exam was normal to light touch and pinprick [Motor Exam] : the motor exam was normal [Oriented To Time, Place, And Person] : oriented to person, place, and time [Impaired Insight] : insight and judgment were intact [Affect] : the affect was normal [FreeTextEntry1] : admits to anxiety & depression, mild but increased over course last yr w/ multiple medical issues.

## 2021-03-10 NOTE — ASSESSMENT
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 AVN, advanced raynauds, telangiectasias \par \par 1)  MCTD/Limited Scleroderma:  SHERI 1:2560S, + RNP > 8.0, SM 3.8, + RF 45 (neg CCP) w/ CK > 200 but denies overt weakness now or in past.   All other serologies to include APS neg w/ nl C3/4, dsDNA \par Primary sx as noted in additon to low grade fevers, arthralgias / myalgias and fatigue when "flaring". Long standing use of HCQ, well tolerated with routine opth exam fine.  \par Currently only feels fairly controlled, function limited by fatigue, hip pain, and anxiety r/t chronic condition.  \par Dx w/ limited SSc by clinical exam but serology for REGGIE \par - Raynaud's moderate to severe at times, tried and failed to tolerate nifedipine/ amlodipine now on felodipine at 10 mg and doing well. Noted digital pitting but no report of active ulcerations now or in past.  \par - GERD (presumed esophageal dysmotility) severe at times w/ dx H Pylori 6/21 completed triple tx, not well tolerated, now sx better on daily PPI fs omeprazole. Denies dysphagia  \par - Telangiectases:  moderate to severe over face, chest s/p laser tx in past.. \par - Sclerodactyly:  moderate in limited pattern, distal to PIPs and around mouth.. \par - Cardiopulmonary:  Routine PFTs w/ DLCO 79-> 74-> 68 (2018) over time... but no reported hx of pleuropericardial effusion and completely denies SOB/JENSEN  cough or change in activity tolerance.  Resting tachycardia but w/out SOB/ CP\par - SerositiS:  elevated LFTs to 900 in past x 1 but repeat studies nl, etoilogy unclear. No renal dysfunction.   having experienced intermittent feverish feeling, myalgias, and fatigue.\par Proteinuria:  intermittently + 200-400mg currently controlled  \par - Myopathy:  mildly elevated CK max 259 7/20...  w/ no previous weakness now or in past.\par NOTE: \par 2019 mild proteinuria PCR 0.4-> nl now \par neg REGGIE, Scl70, RNA polymerase, ANCA \par - 2017 adm to [facility] w/ PNA + response to antibiotics w/ + response immediately\par - 2015/2016 she went off Plaquenil but restarted it because of the Raynaud's/digital pits, restarted and sx stable\par \par 2).  Hypothyroidism on replacement w/ nl TSH \par \par 3)  Oral contraceptive use: changed brands to a low estrogen containing pill.\par  \par 4)  OA hips: primary vs. secondary. ? AVN (need updated eval). bilateral R> L w/ limited ROM and daily pain worse at times. \par Xray Hips:  moderate OA, etiology\par NOTE: \par Tx  acupuncture and PT w/ limited benefit \par \par \par Plan:\par 1.  Updated labs now\par \par 2.  Updated PFTs/ DLCO... \par \par 3.  Updated Echo with attention to evaluating for PAH\par \par 4.  CTscan chest baseline needed given progressive decrease in DLCO over time from 2015 79->  2019 at 68 \par \par 5.  Continue  felodipine 10 mg \par \par 6.  ASA 81mg \par \par 7.  Sars-Cov-2 Vaccine\par \par 8.  Updated imaging b/l hips now\par \par 9.  Return 2 months\par \par \par \par \par \par

## 2021-03-10 NOTE — HISTORY OF PRESENT ILLNESS
[FreeTextEntry1] : 3/5/21\par - overall feels fairly well long standing patient of Dr. Gonzales's transferring care for logistical reasons... \par Dx MCTD/ Limited SSc w/ Raynauds, GERD, telangiectasias, sclerodacytyly -dx 2013 \par PFTs/ DLCO (2/19) and Echo's (9/20) fine over years.. On long standing.. \par -   mg daily last eye exam 6/20\par -  Raynauds now well controlled on routine felodipine 10 mg with no current or previous obvious digital ulcerations but longstanding pitting...  \par -  never did CTscan chest. No current or previous cough, sob, cp/ palpitations/ pleurisy\par -  briefly elevated LFTs ? etiology.. \par -  Feeling poorly over past yr:  appendicitis and severe GERD w/ H Pylori confirme...required antibiotics and repeatedly doesn't tolerate well.. increase- severe GI distress... and results in severe leg cramping so bad causes nausea, HA, fever / flulike sx.. no mucositis, cardiopulmonary, +arthralgias, myalgias... general fatigue worsen but no profound weakness, no rashes.. no serositis.. Episodes last 2-4 wks before feeling nl.. Steroids low dose can help.\par - EGD H pylori tx w/ antibiotics not well tolerated \par - recent elevated LFTs to 900-> repeat (otherwise good).. on 40 mg omeprazole once daily.. with some control\par - appendicitis s/p sx perforated required 2 wk severe reaction to antibiotics (again) .. but recovered well..\par - anxiety:  good/ not great.. on 50 mg sertraline daily.. always feels worse with stress- health issues...   \par \par 1.  MCTD/Limited Scleroderma:  consisting of Raynaud's, GERD (presumed esophageal dysmotility), telangiectases, sclerodactyly, RNP Ab.  \par 2.  GERD. Pt on ranitidine. GERD symptoms on and off. No dysphagia. \par 3.  Hypothyroidism\par 4.  Myopathy: mild elevation of CKs on a couple of occasions\par 5.  Plaquenil use:  no ocular symptoms. \par 6.  Oral contraceptive use: changed brands to a low estrogen containing pill.\par 7.  Right 1st toe IP pain\par 8.  Raynauds:  more problematic in the hands than feet. . \par 9.  OA hips: bilateral.  moderate to severe R > L w/ advanced DJD   An x-ray was read as moderate OA of the hip (acetabular sclerosis and osteophytes).  \par 10. PNa 2017  Antibiotics were administered, and she improved.\par 12.  Resting tachycardia: no dyspnea or chest pain\par \par Meds\par Synthroid 0.137 mg/d\par Ranitidine 150 mg BID \par MVI\par Plaquenil 200 mg/d  \par Felodipine ER 5 mg/d\par \par Vaccines\par PNVX 12/4/12\par PNVX 23 7/22/19 ([id number]; 9/29/20)\par Prevnar 13  7/20/15  ([id number]; exp 11/16)

## 2021-03-10 NOTE — DATA REVIEWED
[FreeTextEntry1] : 8/14/19\par PFTs\par FVC  90\par TLC 84\par DLCO 70\par \par Echo:  10/16/20 nl w/ no mention PAH.. but RA/RV nl in size

## 2021-03-12 ENCOUNTER — LABORATORY RESULT (OUTPATIENT)
Age: 31
End: 2021-03-12

## 2021-03-15 LAB
ALBUMIN SERPL ELPH-MCNC: 4.8 G/DL
ALP BLD-CCNC: 101 U/L
ALT SERPL-CCNC: 20 U/L
ANION GAP SERPL CALC-SCNC: 13 MMOL/L
APPEARANCE: CLEAR
AST SERPL-CCNC: 29 U/L
BASOPHILS # BLD AUTO: 0.02 K/UL
BASOPHILS NFR BLD AUTO: 0.4 %
BILIRUB SERPL-MCNC: 0.4 MG/DL
BILIRUBIN URINE: NEGATIVE
BLOOD URINE: NEGATIVE
BUN SERPL-MCNC: 13 MG/DL
C3 SERPL-MCNC: 126 MG/DL
C4 SERPL-MCNC: 20 MG/DL
CALCIUM SERPL-MCNC: 9.5 MG/DL
CHLORIDE SERPL-SCNC: 100 MMOL/L
CK SERPL-CCNC: 121 U/L
CO2 SERPL-SCNC: 24 MMOL/L
COLOR: COLORLESS
CREAT SERPL-MCNC: 0.52 MG/DL
CREAT SPEC-SCNC: 7 MG/DL
CREAT/PROT UR: NORMAL RATIO
CRP SERPL-MCNC: <3 MG/L
DSDNA AB SER-ACNC: <12 IU/ML
EOSINOPHIL # BLD AUTO: 0.11 K/UL
EOSINOPHIL NFR BLD AUTO: 2.4 %
ERYTHROCYTE [SEDIMENTATION RATE] IN BLOOD BY WESTERGREN METHOD: 22 MM/HR
GLUCOSE QUALITATIVE U: NEGATIVE
GLUCOSE SERPL-MCNC: 88 MG/DL
HCT VFR BLD CALC: 39.4 %
HGB BLD-MCNC: 12.6 G/DL
IMM GRANULOCYTES NFR BLD AUTO: 0.2 %
KETONES URINE: NEGATIVE
LEUKOCYTE ESTERASE URINE: NEGATIVE
LUPUS ANTICOAGULANT CASCADE REFLEX: NORMAL
LYMPHOCYTES # BLD AUTO: 1.64 K/UL
LYMPHOCYTES NFR BLD AUTO: 36.5 %
MAN DIFF?: NORMAL
MCHC RBC-ENTMCNC: 26.9 PG
MCHC RBC-ENTMCNC: 32 GM/DL
MCV RBC AUTO: 84 FL
MONOCYTES # BLD AUTO: 0.55 K/UL
MONOCYTES NFR BLD AUTO: 12.2 %
NEUTROPHILS # BLD AUTO: 2.16 K/UL
NEUTROPHILS NFR BLD AUTO: 48.3 %
NITRITE URINE: NEGATIVE
PH URINE: 7
PLATELET # BLD AUTO: 370 K/UL
POTASSIUM SERPL-SCNC: 3.9 MMOL/L
PROT SERPL-MCNC: 8.1 G/DL
PROT UR-MCNC: <4 MG/DL
PROTEIN URINE: NEGATIVE
RBC # BLD: 4.69 M/UL
RBC # FLD: 13.3 %
SODIUM SERPL-SCNC: 138 MMOL/L
SPECIFIC GRAVITY URINE: 1
UROBILINOGEN URINE: NORMAL
WBC # FLD AUTO: 4.49 K/UL

## 2021-03-16 ENCOUNTER — TRANSCRIPTION ENCOUNTER (OUTPATIENT)
Age: 31
End: 2021-03-16

## 2021-03-19 ENCOUNTER — APPOINTMENT (OUTPATIENT)
Dept: CT IMAGING | Facility: CLINIC | Age: 31
End: 2021-03-19
Payer: COMMERCIAL

## 2021-03-19 ENCOUNTER — OUTPATIENT (OUTPATIENT)
Dept: OUTPATIENT SERVICES | Facility: HOSPITAL | Age: 31
LOS: 1 days | End: 2021-03-19
Payer: COMMERCIAL

## 2021-03-19 ENCOUNTER — APPOINTMENT (OUTPATIENT)
Dept: RADIOLOGY | Facility: CLINIC | Age: 31
End: 2021-03-19
Payer: COMMERCIAL

## 2021-03-19 DIAGNOSIS — J84.9 INTERSTITIAL PULMONARY DISEASE, UNSPECIFIED: ICD-10-CM

## 2021-03-19 PROCEDURE — 71250 CT THORAX DX C-: CPT

## 2021-03-19 PROCEDURE — 73522 X-RAY EXAM HIPS BI 3-4 VIEWS: CPT | Mod: 26

## 2021-03-19 PROCEDURE — 73522 X-RAY EXAM HIPS BI 3-4 VIEWS: CPT

## 2021-03-19 PROCEDURE — 71250 CT THORAX DX C-: CPT | Mod: 26

## 2021-03-25 ENCOUNTER — TRANSCRIPTION ENCOUNTER (OUTPATIENT)
Age: 31
End: 2021-03-25

## 2021-03-29 ENCOUNTER — APPOINTMENT (OUTPATIENT)
Dept: PULMONOLOGY | Facility: CLINIC | Age: 31
End: 2021-03-29
Payer: COMMERCIAL

## 2021-03-29 PROCEDURE — 94726 PLETHYSMOGRAPHY LUNG VOLUMES: CPT

## 2021-03-29 PROCEDURE — 94010 BREATHING CAPACITY TEST: CPT

## 2021-03-29 PROCEDURE — 99072 ADDL SUPL MATRL&STAF TM PHE: CPT

## 2021-03-29 PROCEDURE — 94729 DIFFUSING CAPACITY: CPT

## 2021-04-02 ENCOUNTER — NON-APPOINTMENT (OUTPATIENT)
Age: 31
End: 2021-04-02

## 2021-04-05 LAB
EJ AB SER QL: NEGATIVE
ENA JO1 AB SER IA-ACNC: <20 UNITS
ENA PM/SCL AB SER-ACNC: <20 UNITS
ENA SM+RNP AB SER IA-ACNC: 143 UNITS
ENA SS-A IGG SER QL: <20 UNITS
FIBRILLARIN AB SER QL: NEGATIVE
KU AB SER QL: ABNORMAL
MDA-5 (P140)(CADM-140): <20 UNITS
MI2 AB SER QL: NEGATIVE
NXP-2 (P140): <20 UNITS
OJ AB SER QL: NEGATIVE
PL12 AB SER QL: NEGATIVE
PL7 AB SER QL: NEGATIVE
SRP AB SERPL QL: NEGATIVE
TIF GAMMA (P155/140): <20 UNITS
U2 SNRNP AB SER QL: ABNORMAL

## 2021-04-09 NOTE — PATIENT PROFILE ADULT. - NS TRANSFER DISPOSITION PATIENT BELONGINGS
Pt received PRN Soma for c/o muscle spasms to back. Will continue to monitor for effectiveness. with patient

## 2021-05-07 ENCOUNTER — APPOINTMENT (OUTPATIENT)
Dept: RHEUMATOLOGY | Facility: CLINIC | Age: 31
End: 2021-05-07
Payer: COMMERCIAL

## 2021-05-07 VITALS
SYSTOLIC BLOOD PRESSURE: 128 MMHG | OXYGEN SATURATION: 94 % | WEIGHT: 135 LBS | BODY MASS INDEX: 24.69 KG/M2 | TEMPERATURE: 97.9 F | DIASTOLIC BLOOD PRESSURE: 72 MMHG | HEART RATE: 86 BPM

## 2021-05-07 PROCEDURE — 99072 ADDL SUPL MATRL&STAF TM PHE: CPT

## 2021-05-07 PROCEDURE — 99215 OFFICE O/P EST HI 40 MIN: CPT

## 2021-05-08 NOTE — DATA REVIEWED
[FreeTextEntry1] :               9/18/18 8/14/19 2021 \par PFTs  \par FVC         88->             90->        85\par TLC          98->             84->        80\par DLCO       68->             70->        58\par \par CTScan chest 3/22/21 peripheral nodular ggo LLs w/ no lymphadenopathy\par \par Echo:  10/16/20 nl w/ no mention PAH.. but RA/RV nl in size\par \par Xray b/l hips 3/21 symmetric djd spurring b/l  along articular margins without  gross JS loss without obvious evidence of AVN

## 2021-05-08 NOTE — PHYSICAL EXAM
[General Appearance - Alert] : alert [General Appearance - In No Acute Distress] : in no acute distress [General Appearance - Well Nourished] : well nourished [General Appearance - Well Developed] : well developed [General Appearance - Well-Appearing] : healthy appearing [Sclera] : the sclera and conjunctiva were normal [Extraocular Movements] : extraocular movements were intact [Strabismus] : no strabismus was seen [Outer Ear] : the ears and nose were normal in appearance [Examination Of The Oral Cavity] : the lips and gums were normal [Nasal Cavity] : the nasal mucosa and septum were normal [Oropharynx] : the oropharynx was normal [Neck Appearance] : the appearance of the neck was normal [Respiration, Rhythm And Depth] : normal respiratory rhythm and effort [Exaggerated Use Of Accessory Muscles For Inspiration] : no accessory muscle use [Auscultation Breath Sounds / Voice Sounds] : lungs were clear to auscultation bilaterally [Heart Rate And Rhythm] : heart rate was normal and rhythm regular [Heart Sounds] : normal S1 and S2 [Heart Sounds Gallop] : no gallops [Murmurs] : no murmurs [Heart Sounds Pericardial Friction Rub] : no pericardial rub [Edema] : there was no peripheral edema [Veins - Varicosity Changes] : there were no varicosital changes [Cervical Lymph Nodes Enlarged Posterior Bilaterally] : posterior cervical [Cervical Lymph Nodes Enlarged Anterior Bilaterally] : anterior cervical [Supraclavicular Lymph Nodes Enlarged Bilaterally] : supraclavicular [No CVA Tenderness] : no ~M costovertebral angle tenderness [No Spinal Tenderness] : no spinal tenderness [Abnormal Walk] : normal gait [Nail Clubbing] : no clubbing  or cyanosis of the fingernails [Musculoskeletal - Swelling] : no joint swelling seen [Motor Tone] : muscle strength and tone were normal [Skin Color & Pigmentation] : normal skin color and pigmentation [Skin Turgor] : normal skin turgor [Sensation] : the sensory exam was normal to light touch and pinprick [Motor Exam] : the motor exam was normal [Oriented To Time, Place, And Person] : oriented to person, place, and time [Impaired Insight] : insight and judgment were intact [Affect] : the affect was normal [] : the neck was supple [FreeTextEntry1] : admits to anxiety & depression, mild but increased over course last yr w/ multiple medical issues- better now with stability .

## 2021-05-08 NOTE — REVIEW OF SYSTEMS
[Negative] : Heme/Lymph [Heartburn] : heartburn [As Noted in HPI] : as noted in HPI [Anxiety] : anxiety [FreeTextEntry2] : stable wt  [de-identified] : would like laser tx for telangiectasias

## 2021-05-16 ENCOUNTER — RX RENEWAL (OUTPATIENT)
Age: 31
End: 2021-05-16

## 2021-05-17 LAB — UREA BREATH TEST QL: NEGATIVE

## 2021-05-18 ENCOUNTER — NON-APPOINTMENT (OUTPATIENT)
Age: 31
End: 2021-05-18

## 2021-05-18 ENCOUNTER — APPOINTMENT (OUTPATIENT)
Dept: CARDIOLOGY | Facility: CLINIC | Age: 31
End: 2021-05-18
Payer: COMMERCIAL

## 2021-05-18 VITALS — DIASTOLIC BLOOD PRESSURE: 100 MMHG | SYSTOLIC BLOOD PRESSURE: 130 MMHG

## 2021-05-18 VITALS
HEIGHT: 62 IN | OXYGEN SATURATION: 98 % | TEMPERATURE: 97.3 F | DIASTOLIC BLOOD PRESSURE: 90 MMHG | BODY MASS INDEX: 26.68 KG/M2 | SYSTOLIC BLOOD PRESSURE: 120 MMHG | WEIGHT: 145 LBS | HEART RATE: 95 BPM

## 2021-05-18 PROCEDURE — 93000 ELECTROCARDIOGRAM COMPLETE: CPT

## 2021-05-18 PROCEDURE — 99204 OFFICE O/P NEW MOD 45 MIN: CPT | Mod: 25

## 2021-05-18 RX ORDER — OMEPRAZOLE 40 MG/1
40 CAPSULE, DELAYED RELEASE ORAL DAILY
Qty: 90 | Refills: 1 | Status: DISCONTINUED | COMMUNITY
Start: 2019-11-26 | End: 2021-05-18

## 2021-05-20 ENCOUNTER — NON-APPOINTMENT (OUTPATIENT)
Age: 31
End: 2021-05-20

## 2021-05-20 ENCOUNTER — RX RENEWAL (OUTPATIENT)
Age: 31
End: 2021-05-20

## 2021-05-23 LAB
COVID-19 NUCLEOCAPSID  GAM ANTIBODY INTERPRETATION: NEGATIVE
COVID-19 SPIKE DOMAIN ANTIBODY INTERPRETATION: POSITIVE
SARS-COV-2 AB SERPL IA-ACNC: >250 U/ML
SARS-COV-2 AB SERPL QL IA: 0.09 INDEX

## 2021-05-23 NOTE — PHYSICAL EXAM
[Well Developed] : well developed [Well Nourished] : well nourished [No Acute Distress] : no acute distress [Normal Conjunctiva] : normal conjunctiva [Normal Venous Pressure] : normal venous pressure [No Carotid Bruit] : no carotid bruit [Normal S1, S2] : normal S1, S2 [No Murmur] : no murmur [No Rub] : no rub [Normal Gait] : normal gait [No Edema] : no edema [No Varicosities] : no varicosities [Normal] : alert and oriented, normal memory [de-identified] : S2 is preserved, P2 is normal.

## 2021-05-23 NOTE — HISTORY OF PRESENT ILLNESS
[FreeTextEntry1] : 31-year-old female, kindly referred by Dr. 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  She has limited scleroderma which consist of Raynaud's and esophageal dysmotility which presents,  sclerodactyly and telangiectasias.  She denies any dysphagia.  She also has elevated CPKs.\par Patient also has avascular necrosis of hips and moderate osteoarthritis of the hips.  She has had episodes of pneumonia but denies any episode of Covid infection.\par She has been vaccinated for SARS Covid 19.\par On recent PFTs there has been decrease of DLCO with normal lung functions.\par I reviewed her most recent CAT scan that was performed at [facility] in March 2021 demonstrating bibasilar and peripheral groundglass opacities.  There is no evidence of lymphadenopathy.\par Patient is on felodipine for Raynaud's with good results.  She does have a sclerodactyly.  She is bothered by telangiectasias but there is no bleeding noted.\par She has recently started to exercise by doing interval training on a treadmill.  She does not run fast and thinks there is speed is about 3 mph.  She does not have any chest pain but feels very fatigued by the end of the exercise.\par \par She has had echocardiograms almost on a yearly basis the last of which was about a year ago.  There was no mention of pulmonary hypertension.\par \par She denies any syncope or presyncope.\par I reviewed her most recent blood work as well.\par \par EKG today shows a normal sinus rhythm with normal axis and intervals and no significant ST-T changes.

## 2021-05-23 NOTE — ASSESSMENT
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  Most recent CAT scan of the chest demonstrated groundglass appearance mostly of the lower lobes.  I reviewed the images with the patient.  She has had echocardiograms in the past that have not demonstrated evidence of pulmonary hypertension.  We spoke about pulmonary hypertension in the setting of connective tissue disease.  Patient's Raynaud's has been responsive to felodipine.  We will start with an echocardiogram as a screening tool.  We may need to perform right heart catheterization.  On examination she does not appear to have significant pulmonary hypertension.  If she does have pulmonary hypertension in the setting of her scleroderma and ILD, inhaled prostacyclin's can be utilized in addition to immunosuppressive medications.  Patient will see Dr. Mei for further evaluation of abnormal PFTs.  She will follow-up with me once the above testing is completed.

## 2021-05-23 NOTE — REVIEW OF SYSTEMS
[Feeling Fatigued] : feeling fatigued [Negative] : Heme/Lymph [Fever] : no fever [Headache] : no headache [Earache] : no earache [Sore Throat] : no sore throat [SOB] : no shortness of breath [Leg Claudication] : no intermittent leg claudication [Palpitations] : no palpitations [Syncope] : no syncope [Cough] : no cough [Wheezing] : no wheezing [Nausea] : no nausea [Change In The Stool] : no change in stool [Constipation] : no constipation [Blood in Stool] : no blood in stool

## 2021-05-27 ENCOUNTER — APPOINTMENT (OUTPATIENT)
Dept: PULMONOLOGY | Facility: CLINIC | Age: 31
End: 2021-05-27
Payer: COMMERCIAL

## 2021-05-27 VITALS
SYSTOLIC BLOOD PRESSURE: 120 MMHG | DIASTOLIC BLOOD PRESSURE: 80 MMHG | HEIGHT: 61 IN | BODY MASS INDEX: 25.68 KG/M2 | WEIGHT: 136 LBS

## 2021-05-27 VITALS — HEART RATE: 113 BPM | OXYGEN SATURATION: 99 % | RESPIRATION RATE: 16 BRPM

## 2021-05-27 VITALS — HEART RATE: 92 BPM

## 2021-05-27 PROCEDURE — 99204 OFFICE O/P NEW MOD 45 MIN: CPT

## 2021-05-27 NOTE — CONSULT LETTER
[Dear  ___] : Dear  [unfilled], [Consult Letter:] : I had the pleasure of evaluating your patient, [unfilled]. [Please see my note below.] : Please see my note below. [Sincerely,] : Sincerely, [FreeTextEntry3] : Cj Mei DO [facility]\par Pulmonary Critical Care\par Director Pulmonary Division\par Medical Director Respiratory Therapy\par [facility]\par \par

## 2021-05-27 NOTE — HISTORY OF PRESENT ILLNESS
[TextBox_4] :  MCTD/Limited Scleroderma: consisting of Raynaud's, GERD (presumed esophageal dysmotility), telangiectases, sclerodactyly, RNP Ab. 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

## 2021-05-27 NOTE — DISCUSSION/SUMMARY
[FreeTextEntry1] : Mixed CT/Scleroderma\par DLCO and TLC have decreased from 2015- but stable over last 3 years \par By report previous echocardiogram without any pulmonary hypertension\par Centrilobular GG nodules predominantly lung bases, progressed 8/20-3/21\par She reports GERD, hx H Pyloi on Omeprazole- controlled\par Differential Scleroderma ILD vs esophageal reflux related, no Environmental exposures\par Currently essentially asymptomatic, exercises regularly, hx Avascular necrosis\par Will focus on HOB elevation, no eating 3 hrs prior to bed, adding Pepcid nocturnally\par Repeat CT scan 2 months for any improvement\par Need for more aggressive anti inflammatory  therapy to be made at that time, or sooner if change in status\par Repeat echocardiogram is pending\par Eventual follow up PFts \par 2-3 months or sooner if needed\par \par

## 2021-05-27 NOTE — PHYSICAL EXAM
[No Acute Distress] : no acute distress [Normal Appearance] : normal appearance [Normal Rate/Rhythm] : normal rate/rhythm [Normal S1, S2] : normal s1, s2 [No Murmurs] : no murmurs [No Rubs] : no rubs [No Gallops] : no gallops [No Resp Distress] : no resp distress [No Acc Muscle Use] : no acc muscle use [Normal Rhythm and Effort] : normal rhythm and effort [Clear to Auscultation Bilaterally] : clear to auscultation bilaterally [Normal to Percussion] : normal to percussion [No Abnormalities] : no abnormalities [Normal Gait] : normal gait [No Clubbing] : no clubbing [No Cyanosis] : no cyanosis [No Edema] : no edema [FROM] : FROM [Normal Color/ Pigmentation] : normal color/ pigmentation [No Focal Deficits] : no focal deficits [Oriented x3] : oriented x3 [Normal Affect] : normal affect

## 2021-06-11 ENCOUNTER — APPOINTMENT (OUTPATIENT)
Dept: CARDIOLOGY | Facility: CLINIC | Age: 31
End: 2021-06-11
Payer: COMMERCIAL

## 2021-06-11 PROCEDURE — 93306 TTE W/DOPPLER COMPLETE: CPT

## 2021-06-16 ENCOUNTER — TRANSCRIPTION ENCOUNTER (OUTPATIENT)
Age: 31
End: 2021-06-16

## 2021-06-18 NOTE — ED ADULT NURSE NOTE - [facility] EDP DISCH DISPOSITION ADMI
Patient Instructions by Mara Murillo MD at 5/4/2021 11:20 AM     Author: Mara Murillo MD Service: -- Author Type: Physician    Filed: 5/4/2021 11:56 AM Encounter Date: 5/4/2021 Status: Addendum    : Mara Murillo MD (Physician)    Related Notes: Original Note by Mara Murillo MD (Physician) filed at 5/4/2021 11:51 AM       We start you on new medicine Seroquel which will help you sleep at night and also help with anxiety depression  Try to increase protein in you diet , may be make protein smoothie with fruits   As you weight still quite low , ( before ulcer of stomach your weight was 133 and now you are 113.  Will order ointment for your rash on hand and also try on the left nipple which is crusting a little   Please see dentist as soon you can , to remove you teeth and fit with denturs  Referral placed today    Patient Education     Exercise for a Healthier Heart  You may wonder how you can improve the health of your heart. If youre thinking about exercise, youre on the right track. You dont need to become an athlete, but you do need a certain amount of brisk exercise to help strengthen your heart. If you have been diagnosed with a heart condition, your doctor may recommend exercise to help stabilize your condition. To help make exercise a habit, choose safe, fun activities.       Be sure to check with your health care provider before starting an exercise program.    Why exercise?  Exercising regularly offers many healthy rewards. It can help you do all of the following:    Improve your blood cholesterol levels to help prevent further heart trouble    Lower your blood pressure to help prevent a stroke or heart attack    Control diabetes, or reduce your risk of getting this disease    Improve your heart and lung function    Reach and maintain a healthy weight    Make your muscles stronger and more limber so you can stay active    Prevent falls and fractures by slowing the loss of bone mass  (osteoporosis)    Manage stress better  Exercise tips  Ease into your routine. Set small goals. Then build on them.  Exercise on most days. Aim for a total of 150 or more minutes of moderate to  vigorous intensity activity each week. Consider 40 minutes, 3 to 4 times a week. For best results, activity should last for 40 minutes on average. It is OK to work up to the 40 minute period over time. Examples of moderate-intensity activity is walking one mile in 15 minutes or 30 to 45 minutes of yard work.  Step up your daily activity level. Along with your exercise program, try being more active throughout the day. Walk instead of drive. Do more household tasks or yard work.  Choose one or more activities you enjoy. Walking is one of the easiest things you can do. You can also try swimming, riding a bike, or taking an exercise class.  Stop exercising and call your doctor if you:    Have chest pain or feel dizzy or lightheaded    Feel burning, tightness, pressure, or heaviness in your chest, neck, shoulders, back, or arms    Have unusual shortness of breath    Have increased joint or muscle pain    Have palpitations or an irregular heartbeat      4696-4729 [employer]. [address] [place] [address]. All rights reserved. This information is not intended as a substitute for professional medical care. Always follow your healthcare professional's instructions.         Patient Education   Understanding USDA MyPlate  The USDA (US Department of Agriculture) has guidelines to help you make healthy food choices. These are called MyPlate. MyPlate shows the food groups that make up healthy meals using the image of a place setting. Before you eat, think about the healthiest choices for what to put onto your plate or into your cup or bowl. To learn more about building a healthy plate, visit www.choosemyplate.gov.       The Food Groups    Fruits: Any fruit or 100% fruit juice counts as part of the Fruit Group.  Fruits may be fresh, canned, frozen, or dried, and may be whole, cut-up, or pureed. Make half your plate fruits and vegetables.    Vegetables: Any vegetable or 100% vegetable juice counts as a member of the Vegetable Group. Vegetables may be fresh, frozen, canned, or dried. They can be served raw or cooked and may be whole, cut-up, or mashed. Make half your plate fruits and vegetables.     Grains: All foods made from grains are part of the Grains Group. These include wheat, rice, oats, cornmeal, and barley such as bread, pasta, oatmeal, cereal, tortillas, and grits. Grains should be no more than a quarter of your plate. At least half of your grains should be whole grains.    Protein: This group includes meat, poultry, seafood, beans and peas, eggs, processed soy products (like tofu), nuts (including nut butters), and seeds. Make protein choices no more than a quarter of your plate. Meat and poultry choices should be lean or low fat.    Dairy: All fluid milk products and foods made from milk that contain calcium, like yogurt and cheese are part of the Dairy Group. (Foods that have little calcium, such as cream, butter, and cream cheese, are not part of the group.) Most dairy choices should be low-fat or fat-free.    Oils: These are fats that are liquid at room temperature. They include canola, corn, olive, soybean, and sunflower oil. Foods that are mainly oil include mayonnaise, certain salad dressings, and soft margarines. You should have only 5 to 7 teaspoons of oils a day. You probably already get this much from the food you eat.  Use Periscope to Help Build Your Meals  The [employer] can help you plan and track your meals and activity. You can look up individual foods to see or compare their nutritional value. You can get guidelines for what and how much you should eat. You can compare your food choices. And you can assess personal physical activities and see ways you can improve. Go to  www.rubberit.gov/[employer]/.    8494-2024 [employer]. [address] [place] [address], [place] [address]. All rights reserved. This information is not intended as a substitute for professional medical care. Always follow your healthcare professional's instructions.           Patient Education   Depression and Suicide in Older Adults  Nearly 2 million older Americans have some type of depression. Sadly, some of them even take their own lives. Yet depression among older adults is often ignored. Learn the warning signs. You may help spare a loved one needless pain. You may also save a life.       What Is Depression?  Depression is a mood disorder that affects the way you think and feel. The most common symptom is a feeling of deep sadness. People who are depressed also may seem tired and listless. And nothing seems to give them pleasure. Its normal to grieve or be sad sometimes. But sadness lessens or passes with time. Depression rarely goes away or improves on its own. Other symptoms of depression are:    Sleeping more or less than normal    Eating more or less than normal    Having headaches, stomachaches, or other pains that dont go away    Feeling nervous, empty, or worthless    Crying a great deal    Thinking or talking about suicide or death    Feeling confused or forgetful  What Causes It?  The causes of depression arent fully known. Certain chemicals in the brain play a role. Depression does run in families. And life stresses can also trigger depression in some people. That may be the case with older adults. They often face great burdens, such as the death of friends or a spouse. They may have failing health. And they are more likely to be alone, lonely, or poor.  How You Can Help  Often, depressed people may not want to ask for help. When they do, they may be ignored. Or, they may receive the wrong treatment. You can help by showing parents and older friends love and support. If they seem  depressed, help them find the right treatment. Talk to your doctor. Or contact a local mental health center, social service agency, or hospital. With modern treatment, no one has to suffer from depression.  Resources:    National Pilot of Mental Health  212.500.5275  www.nimh.nih.gov    National [place] on Mental Illness  660.584.6427  www.zeferino.org    [facility]  310.154.5997  www.[facility].org    National Suicide Hotline  488.712.2123 (800-SUICIDE)      6039-1527 [employer]. [address] [place] [address]. All rights reserved. This information is not intended as a substitute for professional medical care. Always follow your healthcare professional's instructions.           Your Bodys Response to Anxiety  Anxiety is part of the bodys natural defense system. It takes over when youre threatened and doesnt let up until youre safe again. While youre in this state, you feel strong emotions such as fear, and physical sensations such as a pounding heartbeat. These feelings make you want to react to the threat. An anxiety response is normal in many situations. But when you have an anxiety disorder, the same response can occur at the wrong times.    Anxiety Can Be Helpful  Anxiety is like an alarm bell in your brain. When youre threatened, the alarm goes off and tells your body to protect you. This is part of the same fight or flight response that helped our early ancestors survive. It made them react quickly to physical threats such as wild animals. Today, you may experience adaptive (healthy) anxiety:    When youre in danger: Anxiety prompts you to run out of a burning building, or to swerve while driving to avoid hitting another car. In theses cases, the anxiety response makes you react quickly to protect yourself.    When you need to succeed: You may feel anxious when you open an overdue bill, study for a test, or prepare to give a speech. In these situations, the anxiety response  helps you focus on the task at hand so you do a better job.  Anxiety Can Also Be a Problem  With an anxiety disorder, your body has the response described above, but in inappropriate ways. The response a person has depends on the anxiety disorder he or she has. With some disorders, the anxiety is way out of proportion to the threat that triggers it. With others, anxiety may occur even when there isnt a clear threat or trigger.  Who Does It Affect?  Some people are more prone to persistent anxiety than others. It tends to run in families, and it affects more younger people than older people. But no age, race, or gender is immune to anxiety problems.  How Does It Feel?  At certain times, people with anxiety may have:    Fear    Muscle tension or pain    Restlessness    Sleeplessness    Difficulty concentrating    Racing heartbeat    Fast breathing    Shaking or trembling    Stomachache    Diarrhea    Loss of energy    Sweating    Cold, clammy hands    Chest pain    Dry mouth  Anxiety Can Be Treated  The good news is that the anxiety thats disrupting your life can be treated. Working with your doctor or other healthcare provider, you can develop skills to help you cope with anxiety. You can also gain the perspective you need to overcome your fears. Note: Good sources of support or guidance can be found at your local hospital, mental health clinic, or an employee assistance program.  If anxiety is wearing you down, here are some things you can do to cope:    Keep in mind that you cant control everything about a situation. Change what you can and let the rest take its course.    Exercise--its a great way to relieve tension and help your body feel relaxed.    Avoid caffeine and nicotine, which can make anxiety symptoms worse.    Fight the temptation to turn to alcohol or unprescribed drugs for relief. They only make things worse in the long run.      8878-7327 [employer]. [address] [facility], [place]  [address]. All rights reserved. This information is not intended as a substitute for professional medical care. Always follow your healthcare professional's instructions.               Advance Directive  Patients advance directive was discussed and I am comfortable with the patients wishes.  Patient Education   Personalized Prevention Plan  You are due for the preventive services outlined below.  Your care team is available to assist you in scheduling these services.  If you have already completed any of these items, please share that information with your care team to update in your medical record.  Health Maintenance Due   Topic Date Due   ? HEPATITIS C SCREENING  Never done   ? DEXA SCAN  Never done   ? COVID-19 Vaccine (1) Never done   ? ZOSTER VACCINES (2 of 3) 01/02/2008              Surgery

## 2021-06-21 ENCOUNTER — TRANSCRIPTION ENCOUNTER (OUTPATIENT)
Age: 31
End: 2021-06-21

## 2021-07-07 ENCOUNTER — RX RENEWAL (OUTPATIENT)
Age: 31
End: 2021-07-07

## 2021-07-08 ENCOUNTER — APPOINTMENT (OUTPATIENT)
Dept: RHEUMATOLOGY | Facility: CLINIC | Age: 31
End: 2021-07-08
Payer: COMMERCIAL

## 2021-07-08 VITALS
WEIGHT: 140 LBS | SYSTOLIC BLOOD PRESSURE: 122 MMHG | BODY MASS INDEX: 26.45 KG/M2 | TEMPERATURE: 97.6 F | DIASTOLIC BLOOD PRESSURE: 78 MMHG

## 2021-07-08 DIAGNOSIS — G72.9 MYOPATHY, UNSPECIFIED: ICD-10-CM

## 2021-07-08 PROCEDURE — 99213 OFFICE O/P EST LOW 20 MIN: CPT

## 2021-07-12 NOTE — HISTORY OF PRESENT ILLNESS
[FreeTextEntry1] : 7/8/21\par - continues to feel well .. exercising routinely with no limiations\par - now sleeping upright to minimize risk aspiration/ inflammation w/ continued use of daily PPI, omeprazole,.. will be c/w for next few mnths then repeat PFTs.. if not better will need to consider additional therapy given decrease in  DLCO now to 58 \par - Updated Echo 6/11/21 nl RV/ LV size and function w/ no evidence effusion or PAH \par -  ongoing significant raynauds- bettter with felodipine no recent ulcerations/ lesions or pain.. sclerodactyly persists- essentially unchanged\par - Baseline Chest CTScan 3/21 + peripheral nodular GGO in LL with no lymphadenopathy.. needs updated PFTs now\par - bothered by telangiectasias.. would like lazer tx..(previously effective several ys ago)\par - Anxiety.. tolerating work/ social life, mood good on sertraline.. \par - has completed full Sars-Cov-2 Vaccine\par - ROS:  flulike, fatigue sx improved after several months of feeling poorly.  Myopathy:  mild elevated in CK few times in past but no overt weakness; no mucositis, cardiopulmonary... no serositis though brief episode of LFTs ? etiology (AST > 900).  Intermittent episodes malaise lasting 2-4 wks several times in past year.  \par Dx MCTD\par PFTs/ DLCO (2/19- update needed) and Echo's (9/20- but no mention of PAH) fine over years.. On long standing.. \par -   mg daily last eye exam 6/20, update still needed   \par Labs 3/21 ESr 22 Myomarker 3 +   nl cmp, CBC ck, CRP, C3/4, w/ neg dsDNA, LAC/ APS, PCR\par - Baseline Chest CTScan:  + peripheral nodular GGO in lower lobes \par \par \par 1.  MCTD/Limited Scleroderma:  consisting of Raynaud's, GERD (presumed esophageal dysmotility), telangiectases, sclerodactyly, RNP Ab.  \par 2.  GERD. Pt on ranitidine. GERD symptoms on and off. No dysphagia. \par 3.  Hypothyroidism\par 4.  Myopathy: mild elevation of CKs on a couple of occasions\par 5.  Plaquenil use:  no ocular symptoms. \par 6.  Oral contraceptive use: changed brands to a low estrogen containing pill.\par 7.  Raynauds:  more problematic in the hands than feet. with pitting  . \par 9.  Avascular necrosis of hips:   bilateral.  moderate to severe R > L w/ advanced DJD   An x-ray was read as moderate OA of the hip (acetabular sclerosis and osteophytes).  \par 10. PNa 2017  Antibiotics were administered, and she improved.\par 12.  Resting tachycardia: no dyspnea or chest pain\par

## 2021-07-12 NOTE — REVIEW OF SYSTEMS
[Heartburn] : heartburn [As Noted in HPI] : as noted in HPI [Anxiety] : anxiety [Negative] : Heme/Lymph [FreeTextEntry2] : stable wt  [de-identified] : would like laser tx for telangiectasias

## 2021-07-12 NOTE — ASSESSMENT
[FreeTextEntry1] : 32 yo wf with long hx Limited SSc, MCTD + RNP w/  advanced DJD b/l hips ? AVN, advanced raynauds, telangiectasias \par \par 1)  MCTD/Limited Scleroderma:  SHERI 1:2560S, + RNP > 8.0, SM 3.8, + RF 45 (neg CCP) w/ CK > 200 (intermittently) but no  overt weakness now or in past.   All other serologies to include APS neg w/ nl C3/4, dsDNA, SSA/B \par Primary sx:  raynauds, sclerodactyly, telangiectasias, in addition to low grade fevers, arthralgias / myalgias and fatigue when "flaring". Long standing use of HCQ, well tolerated with routine opth exam fine but updated study needed now (last 6/20).  \par Now reports feeling  fairly well controlled,  \par Dx w/ limited SSc by clinical exam but serology for REGGIE neg\par - Raynaud's moderate to severe at times, tried and failed to tolerate nifedipine/ amlodipine now on felodipine at 10 mg and doing well. Noted digital pitting but no report of active ulcerations now or in past.  \par - GERD (presumed esophageal dysmotility) severe at times w/ dx H Pylori 6/21 completed triple tx, not well tolerated, now sx better on daily PPI fs omeprazole. Denies dysphagia now or in past but concerned about possible aspiration and advised sleeping HOB elevated.. \par - Telangiectases:  moderate to severe over face, chest s/p laser tx in past.... would like to resume tx  \par - Sclerodactyly:  moderate in limited pattern, distal to PIPs and around mouth..stable x many ys now has not progressed \par - Cardiopulmonary:  Routine PFTs w/ DLCO 79-> 74-> 68 (2018)-> 70 (2019)-> 58 (3/21) w/ volumes relatively well preserved over time... but no reported hx of pleuropericardial effusion and completely denies SOB/JENSEN  cough or change in activity tolerance (exercises routinely).  Resting tachycardia but w/out SOB/ CP.  Baseline Chest CTScan 3/21 + peripheral nodular GGO in LL w/ no lymphadenopathy and echo 6/21 essentially nl w/ NO evidence of PAH.  If DLCO remains low in future may need R heart cath but not indicated at this time\par - SerositiS:  elevated LFTs to 900 in past x 1 but repeat studies nl, etiology unclear. No renal dysfunction, no pleural/pericardial effusions.   having experienced intermittent feverish feeling, myalgias, and fatigue\par Proteinuria:  intermittently + 200-400mg currently controlled  \par - Myopathy:  mildly elevated CK max 259 7/20...  w/ no previous weakness now or in past.\par NOTE: \par 2019 mild proteinuria PCR 0.4-> nl now \par neg REGGIE, Scl70, RNA polymerase, ANCA \par - 2017 adm to [facility] w/ PNA + response to antibiotics w/ + response immediately\par - 2015/2016 she went off Plaquenil but restarted it because of the Raynaud's/digital pits, restarted and sx stable\par \par 2).  Hypothyroidism on replacement w/ nl TSH \par \par 3)  Oral contraceptive use: changed brands to a low estrogen containing pill.\par  \par 4)  OA hips: primary vs. secondary. ? AVN (need updated eval). bilateral R> L w/ limited ROM and daily pain worse at times. \par Xray Hips:  moderate OA, etiology\par NOTE: \par Tx  acupuncture and PT w/ limited benefit \par \par \par Plan:\par 1.  Continue to f/u  pulmonary  progressive decrease in DLCO and noted GGO peripheral b/l LL. \par \par 2.  Continue f/u  (Dr Jeffrey Pate [phone]).. as needed, at least annually. \par \par 3.  May need to add tx given concerns for progressive decrease in DLCO and GGO.. inflammatory... await pulm eval/ review, refer to Dr. Mei - repeat PFTs few mnths.. 9/21\par \par 4.  Continue  felodipine 10 mg \par \par 5.  ASA 81mg \par \par 6.  Return 2 months\par \par \par \par \par \par

## 2021-07-12 NOTE — PHYSICAL EXAM
[General Appearance - Alert] : alert [General Appearance - In No Acute Distress] : in no acute distress [General Appearance - Well Nourished] : well nourished [General Appearance - Well Developed] : well developed [General Appearance - Well-Appearing] : healthy appearing [Sclera] : the sclera and conjunctiva were normal [Extraocular Movements] : extraocular movements were intact [Strabismus] : no strabismus was seen [Outer Ear] : the ears and nose were normal in appearance [Examination Of The Oral Cavity] : the lips and gums were normal [Nasal Cavity] : the nasal mucosa and septum were normal [Oropharynx] : the oropharynx was normal [Neck Appearance] : the appearance of the neck was normal [Respiration, Rhythm And Depth] : normal respiratory rhythm and effort [Exaggerated Use Of Accessory Muscles For Inspiration] : no accessory muscle use [Auscultation Breath Sounds / Voice Sounds] : lungs were clear to auscultation bilaterally [Heart Rate And Rhythm] : heart rate was normal and rhythm regular [Heart Sounds] : normal S1 and S2 [Heart Sounds Gallop] : no gallops [Murmurs] : no murmurs [Heart Sounds Pericardial Friction Rub] : no pericardial rub [Edema] : there was no peripheral edema [Veins - Varicosity Changes] : there were no varicosital changes [Cervical Lymph Nodes Enlarged Posterior Bilaterally] : posterior cervical [Cervical Lymph Nodes Enlarged Anterior Bilaterally] : anterior cervical [Supraclavicular Lymph Nodes Enlarged Bilaterally] : supraclavicular [No CVA Tenderness] : no ~M costovertebral angle tenderness [No Spinal Tenderness] : no spinal tenderness [Abnormal Walk] : normal gait [Nail Clubbing] : no clubbing  or cyanosis of the fingernails [Musculoskeletal - Swelling] : no joint swelling seen [Motor Tone] : muscle strength and tone were normal [Skin Color & Pigmentation] : normal skin color and pigmentation [Skin Turgor] : normal skin turgor [] : no rash [Sensation] : the sensory exam was normal to light touch and pinprick [Motor Exam] : the motor exam was normal [Oriented To Time, Place, And Person] : oriented to person, place, and time [Impaired Insight] : insight and judgment were intact [Affect] : the affect was normal [FreeTextEntry1] : admits to anxiety & depression, mild but increased over course last yr w/ multiple medical issues- better now with stability .

## 2021-07-26 ENCOUNTER — NON-APPOINTMENT (OUTPATIENT)
Age: 31
End: 2021-07-26

## 2021-07-26 ENCOUNTER — APPOINTMENT (OUTPATIENT)
Dept: DERMATOLOGY | Facility: CLINIC | Age: 31
End: 2021-07-26
Payer: COMMERCIAL

## 2021-07-26 PROCEDURE — 99203 OFFICE O/P NEW LOW 30 MIN: CPT

## 2021-07-26 NOTE — HISTORY OF PRESENT ILLNESS
[FreeTextEntry1] : Evaluation for red spots on the face, neck, chest and upper arms. [de-identified] : Patient with MCTD, had developed extensive red spots.  Increasing in number, despite being compliant with sunscreens.  No bleeding or tenderness.  No self tx or prior tx for the red spots.  MCTD is currently tx'ed with plaquenil.

## 2021-07-26 NOTE — PHYSICAL EXAM
[Alert] : alert [Oriented x 3] : ~L oriented x 3 [Well Nourished] : well nourished [FreeTextEntry3] : Blanching telangiectatic macules, diffusely on the bilateral malar region, cheeks, chin and forehead.  Also on the anterior/lateral neck and upper arms, v-neck region of sternum.

## 2021-07-26 NOTE — ASSESSMENT
[FreeTextEntry1] : Telangiectasias secondary to MCTD.\par Recommend PDL txs.\par Patient photographed today, for insurance pre-authorization.\par Risks/benefits of tx discussed, including possible risk of purpura.\par After care including photoprotection discussed.

## 2021-07-26 NOTE — DISCHARGE NOTE ADULT - FUNCTIONAL SCREEN CURRENT LEVEL: DRESSING, MLM
[FreeTextEntry1] : Entered by Kirt Knox, acting as scribe for Dr. Silas London.\par \par The documentation recorded by the scribe accurately reflects the service I personally performed and the decisions made by me.  (0) independent

## 2021-07-27 ENCOUNTER — OUTPATIENT (OUTPATIENT)
Dept: OUTPATIENT SERVICES | Facility: HOSPITAL | Age: 31
LOS: 1 days | End: 2021-07-27
Payer: COMMERCIAL

## 2021-07-27 ENCOUNTER — APPOINTMENT (OUTPATIENT)
Dept: CT IMAGING | Facility: CLINIC | Age: 31
End: 2021-07-27
Payer: COMMERCIAL

## 2021-07-27 ENCOUNTER — NON-APPOINTMENT (OUTPATIENT)
Age: 31
End: 2021-07-27

## 2021-07-27 DIAGNOSIS — Z00.8 ENCOUNTER FOR OTHER GENERAL EXAMINATION: ICD-10-CM

## 2021-07-27 PROCEDURE — 71250 CT THORAX DX C-: CPT

## 2021-07-27 PROCEDURE — 71250 CT THORAX DX C-: CPT | Mod: 26

## 2021-08-02 ENCOUNTER — NON-APPOINTMENT (OUTPATIENT)
Age: 31
End: 2021-08-02

## 2021-08-23 ENCOUNTER — APPOINTMENT (OUTPATIENT)
Dept: PULMONOLOGY | Facility: CLINIC | Age: 31
End: 2021-08-23
Payer: COMMERCIAL

## 2021-08-23 VITALS — SYSTOLIC BLOOD PRESSURE: 120 MMHG | BODY MASS INDEX: 26.83 KG/M2 | WEIGHT: 142 LBS | DIASTOLIC BLOOD PRESSURE: 70 MMHG

## 2021-08-23 VITALS — RESPIRATION RATE: 16 BRPM

## 2021-08-23 PROCEDURE — 99214 OFFICE O/P EST MOD 30 MIN: CPT

## 2021-08-23 NOTE — CONSULT LETTER
[Dear  ___] : Dear  [unfilled], [Consult Letter:] : I had the pleasure of evaluating your patient, [unfilled]. [Please see my note below.] : Please see my note below. [Sincerely,] : Sincerely, [FreeTextEntry3] : Cj Mei DO [facility]\par Pulmonary Critical Care\par Director Pulmonary Division\par Medical Director Respiratory Therapy\par [facility]\par \par

## 2021-08-23 NOTE — HISTORY OF PRESENT ILLNESS
[TextBox_4] :  MCTD/Limited Scleroderma: consisting of Raynaud's, GERD (presumed esophageal dysmotility), telangiectases, sclerodactyly, RNP Ab. 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

## 2021-08-23 NOTE — DISCUSSION/SUMMARY
[FreeTextEntry1] : Mixed CT/Scleroderma, slowly progressive ILD, dilated esophagus\par recent echocardiogram without  any pulmonary hypertension\par Centrilobular GG nodules predominantly lung bases, progressed from 2020 - stable 7/21-3/21\par also reticular scarring upper lobes\par Suspect combination of  esophageal reflux and Scleroderma related, no Environmental exposures\par Currently essentially asymptomatic, exercises regularly, hx Avascular necrosis\par taking aspiration precautions, will progress to GI motility eval Dr Marvin\par Spoke with Rheumatology re starting trial of disease modifying agents\par Follow up PFts \par Fully vaccinated\par 3 months or sooner if needed\par \par

## 2021-08-24 ENCOUNTER — APPOINTMENT (OUTPATIENT)
Dept: DERMATOLOGY | Facility: CLINIC | Age: 31
End: 2021-08-24
Payer: COMMERCIAL

## 2021-08-24 PROCEDURE — 17106 DSTR CUT VSC PRLF LES<10SQCM: CPT

## 2021-09-24 ENCOUNTER — APPOINTMENT (OUTPATIENT)
Dept: RHEUMATOLOGY | Facility: CLINIC | Age: 31
End: 2021-09-24
Payer: COMMERCIAL

## 2021-09-24 VITALS
HEIGHT: 61 IN | WEIGHT: 145 LBS | SYSTOLIC BLOOD PRESSURE: 130 MMHG | OXYGEN SATURATION: 93 % | DIASTOLIC BLOOD PRESSURE: 80 MMHG | TEMPERATURE: 97.3 F | BODY MASS INDEX: 27.38 KG/M2 | HEART RATE: 83 BPM

## 2021-09-24 PROCEDURE — 99214 OFFICE O/P EST MOD 30 MIN: CPT

## 2021-09-25 NOTE — ASSESSMENT
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 AVN, advanced raynauds, telangiectasias \par \par 1)  MCTD/Limited Scleroderma:  SHERI 1:2560S, + RNP > 8.0, SM 3.8, + RF 45 (neg CCP) w/ CK > 200 (intermittently) but no  overt weakness now or in past.   All other serologies to include APS neg w/ nl C3/4, dsDNA, SSA/B \par Primary sx:  raynauds, sclerodactyly, telangiectasias, in addition to low grade fevers, arthralgias / myalgias and fatigue when "flaring". Long standing use of HCQ, well tolerated with routine opth exam fine 9/21 s/p lasik sx, with intermittent bouts "conjunctivitis"- painful/ red in one or both eyes- not present now, needs immediate eval if present to r/o inflammatory eye dz vs. infection (minimal drainage when present, spontaneous onset)\par Currently reports feeling well controlled and functionally active, exercising daily with no limitations- cardio/ str training > 60 mn daily ,  \par Dx w/ limited SSc by clinical exam but serology for REGGIE neg-\par - Raynaud's moderate to severe at times, tried and failed to tolerate nifedipine/ amlodipine now on felodipine at 10 mg and doing well. Noted digital pitting but no report of active ulcerations now or in past.  \par - GERD (presumed esophageal dysmotility) severe at times w/ dx H Pylori 12/20 completed triple tx, not well tolerated, now sx better on daily PPI fs omeprazole once daily. Denies dysphagia now or in past but concerned about possible aspiration and advised sleeping HOB elevated.. Discussed possible add on tx H2.. but no change necessary at this time. Last EGD 12/20 (mild gastritis but otherwise neg)\par - Telangiectases:  moderate to severe over face, chest s/p laser tx in past and again working with Dr. Rutherford now.... 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 but no reported hx of pleuropericardial effusion and completely denies SOB/JENSEN  cough or change in activity tolerance (exercises routinely).  Resting tachycardia but w/out SOB/ CP.  Baseline Chest CTScan 3/21 + peripheral nodular GGO in LL w/ no lymphadenopathy and echo 6/21 essentially nl w/ NO evidence of PAH.  If DLCO remains low in future may need R heart cath but not indicated at this time. F/u scheduled with both pulm/ card\par - SerositiS:  elevated LFTs to 900 in past x 1 but repeat studies nl, etiology unclear. No renal dysfunction, no pleural/pericardial effusions.   having experienced intermittent feverish feeling, myalgias, and fatigue\par Proteinuria:  intermittently + 200-400mg currently controlled  \par - Myopathy:  mildly elevated CK max 259 7/20...  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  Hypothyroidism on replacement w/ nl TSH \par \par 3) Family planning:  Oral contraceptive use: changed brands to a low estrogen containing pill- currently off, not in relationship, denies sexual activity.  Discussed current reproductive guidelines \par  \par 4)  OA hips: primary vs. secondary. ? AVN (need updated eval). bilateral R> L w/ limited ROM and daily pain worse at times, doing well now \par Xray Hips:  moderate OA, etiology\par NOTE: \par Tx  acupuncture and PT w/ limited benefit \par \par \par Plan:\par 1.  Continue to f/u  pulmonary  progressive decrease in DLCO and noted GGO peripheral b/l LL. \par \par 2.  Continue f/u  (Dr Jeffrey Pate [phone]).. as needed, at least annually. \par \par 3.  May need to add tx given concerns for progressive decrease in DLCO and GGO.. inflammatory... await pulm eval/ review, refer to Dr. Mei - repeat PFTs few mnths.. 9/21\par \par 4.  Continue  felodipine 10 mg, call immediately if any ulcerations present  \par \par 5.  ASA 81mg \par \par 6.  continue to work with derm\par \par 7.Is aware to call if there is any change in her underlying symptoms.\par \par \par 8.  Return 2 months \par \par \par \par \par \par

## 2021-09-25 NOTE — REVIEW OF SYSTEMS
[Heartburn] : heartburn [As Noted in HPI] : as noted in HPI [Anxiety] : anxiety [Negative] : Heme/Lymph [FreeTextEntry2] : stable wt  [de-identified] : would like laser tx for telangiectasias

## 2021-09-25 NOTE — HISTORY OF PRESENT ILLNESS
[FreeTextEntry1] : 9/24/21 \par - overall doing well, absolutely no sob/dye, cough or pleuritic discomfort- exercising routinely \par - raynauds controlled on felodipine 10 mg\par - laser tx for telangiectasias with + response x 1 tx already.. \par - GERD sx fully controlled on  40 mg PPI daily, HOB elevated.. \par - no other new studies since last visit.. \par - anxiety well controlled on current sertraline, working FT \par - vaccinations up to date, declines flu vaccine at this time.. but absolutely plans to get this and the BOOSter, COVID spike protein 5/21 > 250 \par - PFTs updated 3/29/21 \par - Updated Echo 6/11/21 nl RV/ LV size and function w/ no evidence effusion or PAH \par - Baseline Chest CTScan 3/21 + peripheral nodular GGO in LL with no lymphadenopathy.. needs updated PFTs now \par -   mg daily last eye exam 6/20, update still needed   \par Labs 3/21 ESr 22 Myomarker 3 +   nl cmp, CBC ck, CRP, C3/4, w/ neg dsDNA, LAC/ APS, PCR\par - Baseline Chest CTScan:  + peripheral nodular GGO in lower lobes \par \par \par 1.  MCTD/Limited Scleroderma:  consisting of Raynaud's, GERD (presumed esophageal dysmotility), telangiectases, sclerodactyly, RNP Ab.  \par 2.  GERD. Pt on ranitidine. GERD symptoms on and off. No dysphagia. \par 3.  Hypothyroidism\par 4.  Myopathy: mild elevation of CKs on a couple of occasions\par 5.  Plaquenil use:  no ocular symptoms. \par 6.  Oral contraceptive use: changed brands to a low estrogen containing pill.\par 7.  Raynauds:  more problematic in the hands than feet. with pitting  . \par 9.  Avascular necrosis of hips:   bilateral.  moderate to severe R > L w/ advanced DJD   An x-ray was read as moderate OA of the hip (acetabular sclerosis and osteophytes).  \par 10. PNa 2017  Antibiotics were administered, and she improved.\par 12.  Resting tachycardia: no dyspnea or chest pain\par

## 2021-09-25 NOTE — PHYSICAL EXAM
[General Appearance - In No Acute Distress] : in no acute distress [General Appearance - Alert] : alert [General Appearance - Well Nourished] : well nourished [General Appearance - Well Developed] : well developed [General Appearance - Well-Appearing] : healthy appearing [Sclera] : the sclera and conjunctiva were normal [Strabismus] : no strabismus was seen [Extraocular Movements] : extraocular movements were intact [Outer Ear] : the ears and nose were normal in appearance [Examination Of The Oral Cavity] : the lips and gums were normal [Nasal Cavity] : the nasal mucosa and septum were normal [Oropharynx] : the oropharynx was normal [Neck Appearance] : the appearance of the neck was normal [Respiration, Rhythm And Depth] : normal respiratory rhythm and effort [Exaggerated Use Of Accessory Muscles For Inspiration] : no accessory muscle use [Auscultation Breath Sounds / Voice Sounds] : lungs were clear to auscultation bilaterally [Heart Rate And Rhythm] : heart rate was normal and rhythm regular [Heart Sounds] : normal S1 and S2 [Heart Sounds Gallop] : no gallops [Murmurs] : no murmurs [Heart Sounds Pericardial Friction Rub] : no pericardial rub [Edema] : there was no peripheral edema [Veins - Varicosity Changes] : there were no varicosital changes [Cervical Lymph Nodes Enlarged Posterior Bilaterally] : posterior cervical [Cervical Lymph Nodes Enlarged Anterior Bilaterally] : anterior cervical [Supraclavicular Lymph Nodes Enlarged Bilaterally] : supraclavicular [Abnormal Walk] : normal gait [Musculoskeletal - Swelling] : no joint swelling seen [Nail Clubbing] : no clubbing  or cyanosis of the fingernails [Motor Tone] : muscle strength and tone were normal [Skin Color & Pigmentation] : normal skin color and pigmentation [Skin Turgor] : normal skin turgor [] : no rash [Sensation] : the sensory exam was normal to light touch and pinprick [Motor Exam] : the motor exam was normal [Oriented To Time, Place, And Person] : oriented to person, place, and time [Impaired Insight] : insight and judgment were intact [Affect] : the affect was normal [FreeTextEntry1] : admits to anxiety & depression both well controlled at this time,

## 2021-10-07 ENCOUNTER — APPOINTMENT (OUTPATIENT)
Dept: DERMATOLOGY | Facility: CLINIC | Age: 31
End: 2021-10-07
Payer: COMMERCIAL

## 2021-10-07 PROCEDURE — 17106 DSTR CUT VSC PRLF LES<10SQCM: CPT | Mod: 79

## 2021-10-11 LAB
ALBUMIN SERPL ELPH-MCNC: 4.7 G/DL
ALP BLD-CCNC: 98 U/L
ALT SERPL-CCNC: 30 U/L
ANION GAP SERPL CALC-SCNC: 16 MMOL/L
APPEARANCE: CLEAR
AST SERPL-CCNC: 40 U/L
BASOPHILS # BLD AUTO: 0.03 K/UL
BASOPHILS NFR BLD AUTO: 0.6 %
BILIRUB SERPL-MCNC: 0.2 MG/DL
BILIRUBIN URINE: NEGATIVE
BLOOD URINE: NEGATIVE
BUN SERPL-MCNC: 13 MG/DL
C3 SERPL-MCNC: 137 MG/DL
C4 SERPL-MCNC: 23 MG/DL
CALCIUM SERPL-MCNC: 9.6 MG/DL
CHLORIDE SERPL-SCNC: 100 MMOL/L
CK SERPL-CCNC: 229 U/L
CO2 SERPL-SCNC: 23 MMOL/L
COLOR: NORMAL
CREAT SERPL-MCNC: 0.52 MG/DL
CREAT SPEC-SCNC: 32 MG/DL
CREAT/PROT UR: 0.1 RATIO
CRP SERPL-MCNC: <3 MG/L
DSDNA AB SER-ACNC: <12 IU/ML
EOSINOPHIL # BLD AUTO: 0.15 K/UL
EOSINOPHIL NFR BLD AUTO: 2.8 %
ERYTHROCYTE [SEDIMENTATION RATE] IN BLOOD BY WESTERGREN METHOD: 27 MM/HR
GLUCOSE QUALITATIVE U: NEGATIVE
GLUCOSE SERPL-MCNC: 85 MG/DL
HCT VFR BLD CALC: 40.9 %
HGB BLD-MCNC: 12.8 G/DL
IMM GRANULOCYTES NFR BLD AUTO: 0.2 %
KETONES URINE: NEGATIVE
LEUKOCYTE ESTERASE URINE: NEGATIVE
LYMPHOCYTES # BLD AUTO: 1.58 K/UL
LYMPHOCYTES NFR BLD AUTO: 29.4 %
MAN DIFF?: NORMAL
MCHC RBC-ENTMCNC: 26.5 PG
MCHC RBC-ENTMCNC: 31.3 GM/DL
MCV RBC AUTO: 84.7 FL
MONOCYTES # BLD AUTO: 0.67 K/UL
MONOCYTES NFR BLD AUTO: 12.5 %
NEUTROPHILS # BLD AUTO: 2.94 K/UL
NEUTROPHILS NFR BLD AUTO: 54.5 %
NITRITE URINE: NEGATIVE
PH URINE: 7
PLATELET # BLD AUTO: 369 K/UL
POTASSIUM SERPL-SCNC: 4.4 MMOL/L
PROT SERPL-MCNC: 8.3 G/DL
PROT UR-MCNC: 4 MG/DL
PROTEIN URINE: NEGATIVE
RBC # BLD: 4.83 M/UL
RBC # FLD: 12.6 %
SODIUM SERPL-SCNC: 139 MMOL/L
SPECIFIC GRAVITY URINE: 1.01
UROBILINOGEN URINE: NORMAL
WBC # FLD AUTO: 5.38 K/UL

## 2021-11-11 ENCOUNTER — APPOINTMENT (OUTPATIENT)
Dept: DERMATOLOGY | Facility: CLINIC | Age: 31
End: 2021-11-11
Payer: COMMERCIAL

## 2021-11-11 PROCEDURE — 17106 DSTR CUT VSC PRLF LES<10SQCM: CPT | Mod: 79

## 2021-12-03 ENCOUNTER — APPOINTMENT (OUTPATIENT)
Dept: PULMONOLOGY | Facility: CLINIC | Age: 31
End: 2021-12-03
Payer: COMMERCIAL

## 2021-12-03 VITALS — BODY MASS INDEX: 26.26 KG/M2 | WEIGHT: 139 LBS | DIASTOLIC BLOOD PRESSURE: 80 MMHG | SYSTOLIC BLOOD PRESSURE: 130 MMHG

## 2021-12-03 VITALS — RESPIRATION RATE: 16 BRPM | HEART RATE: 51 BPM

## 2021-12-03 PROCEDURE — 99214 OFFICE O/P EST MOD 30 MIN: CPT

## 2021-12-03 NOTE — DISCUSSION/SUMMARY
[FreeTextEntry1] : Mixed CT/Scleroderma, slowly progressive ILD, dilated esophagus\par At baseline, no pulmonary or GI complaints\par recent echocardiogram without  any pulmonary hypertension, repeat pending\par Centrilobular GG nodules predominantly lung bases, progressed from 2020 - stable 7/21-3/21\par also reticular scarring upper lobes\par Suspect combination of  esophageal reflux and Scleroderma related, no Environmental exposures\par Currently essentially asymptomatic, exercises regularly, hx Avascular necrosis\par taking aspiration precautions, asymptomatic, using Proton pump ( day) and H2 night\par repeat PFts and CT chest\par Discussed with Rheumatology, further DMARDs pending repeat CT scan and PFts\par Fully vaccinated x 3\par 3 months or sooner if needed\par \par

## 2021-12-03 NOTE — HISTORY OF PRESENT ILLNESS
[TextBox_4] :  MCTD/Limited Scleroderma: consisting of Raynaud's, GERD (presumed esophageal dysmotility), telangiectases, sclerodactyly, RNP Ab. \par sig GERD\par no fever, chill, chest pain\par never smoker\par no GERD\par no sputum, \par no fever, chill, chest pain\par no sputum\par fully vaccinated x3 , Moderna

## 2021-12-03 NOTE — CONSULT LETTER
[Dear  ___] : Dear  [unfilled], [Consult Letter:] : I had the pleasure of evaluating your patient, [unfilled]. [Please see my note below.] : Please see my note below. [Sincerely,] : Sincerely, [FreeTextEntry3] : C[facility] Mei DO [facility]\par Pulmonary Critical Care\par Director Pulmonary Division\par Medical Director Respiratory Therapy\par [facility]\par \par

## 2021-12-16 ENCOUNTER — APPOINTMENT (OUTPATIENT)
Dept: DERMATOLOGY | Facility: CLINIC | Age: 31
End: 2021-12-16
Payer: COMMERCIAL

## 2021-12-16 PROCEDURE — 17106 DSTR CUT VSC PRLF LES<10SQCM: CPT | Mod: 79

## 2021-12-22 ENCOUNTER — APPOINTMENT (OUTPATIENT)
Dept: RHEUMATOLOGY | Facility: CLINIC | Age: 31
End: 2021-12-22
Payer: COMMERCIAL

## 2021-12-22 VITALS
OXYGEN SATURATION: 98 % | TEMPERATURE: 97.4 F | WEIGHT: 143 LBS | HEART RATE: 100 BPM | DIASTOLIC BLOOD PRESSURE: 80 MMHG | SYSTOLIC BLOOD PRESSURE: 120 MMHG | BODY MASS INDEX: 27 KG/M2 | HEIGHT: 61 IN

## 2021-12-22 DIAGNOSIS — M87.059 IDIOPATHIC ASEPTIC NECROSIS OF UNSPECIFIED FEMUR: ICD-10-CM

## 2021-12-22 PROCEDURE — 99214 OFFICE O/P EST MOD 30 MIN: CPT

## 2021-12-22 NOTE — REVIEW OF SYSTEMS
[Heartburn] : heartburn [As Noted in HPI] : as noted in HPI [Anxiety] : anxiety [Negative] : Heme/Lymph [FreeTextEntry2] : stable wt  [de-identified] : would like laser tx for telangiectasias

## 2021-12-22 NOTE — ASSESSMENT
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 AVN, advanced raynauds, telangiectasias \par \par 1)  MCTD/Limited Scleroderma:  SHERI 1:2560S, + RNP > 8.0, SM 3.8, + RF 45 (neg CCP) w/ CK > 200 (intermittently) but no  overt weakness now or in past.   All other serologies to include APS neg w/ nl C3/4, dsDNA, SSA/B \par Primary sx:  raynauds, sclerodactyly, telangiectasias, NEW onset calcinosis trivedi surface both hands 12/21.. in addition to low grade fevers, arthralgias / myalgias and fatigue when "flaring". Long standing use of HCQ (200 mg= 3.07mg/kg), well tolerated with routine opth exam fine 9/21 s/p lasik sx, with intermittent bouts "conjunctivitis"- painful/ red in one or both eyes- not present now, needs immediate eval if present to r/o inflammatory eye dz vs. infection (minimal drainage when present, spontaneous onset)\par Currently reports feeling well controlled and functionally active, exercising daily with no limitations- cardio/ str training > 60 mn daily ,  \par Dx w/ limited SSc by clinical exam but serology for REGGIE neg repeatedly \par - Raynaud's moderate to severe at times, tried and failed to tolerate nifedipine/ amlodipine now on felodipine at 10 mg and doing well. Noted digital pitting but no report of active ulcerations now or in past.  \par - GERD (presumed esophageal dysmotility) severe at times w/ dx H Pylori 12/20 completed triple tx, not well tolerated, now sx better on daily PPI fs omeprazole once daily. Denies dysphagia now or in past but concerned about possible aspiration and advised sleeping HOB elevated.. and continued  tx H2 at HS and PPI in am.. . Last EGD 12/20 (mild gastritis but otherwise neg- evidence of dysmotility??)\par - Telangiectases:  moderate to severe over face, chest s/p laser tx in past and again working with Dr. Rutherford now.... pleased with outcome  \par - Sclerodactyly:  moderate in limited pattern, distal to PIPs and around mouth..stable x many ys now has not progressed. \par - NEW calcinosis:  b/l hands.. will refer to OT for hand tx now... \par - Cardiopulmonary:  Routine PFTs w/ DLCO 79-> 74-> 68 (2018)-> 70 (2019)-> 58 (3/21) w/ volumes relatively well preserved over time... but no reported hx of pleuropericardial effusion and completely denies SOB/JENSEN  cough or change in activity tolerance (exercises routinely).  Resting tachycardia but w/out SOB/ CP.  Baseline Chest CTScan 3/21 + peripheral nodular GGO in LL w/ no lymphadenopathy and echo 6/21 essentially nl w/ NO evidence of PAH.  If DLCO remains low in future may need R heart cath but not indicated at this time. F/u scheduled with both pulm/ card\par - SerositiS:  elevated LFTs to 900 in past x 1 but repeat studies nl, etiology unclear. No renal dysfunction, no pleural/pericardial effusions.   having experienced intermittent feverish feeling, myalgias, and fatigue\par Proteinuria:  intermittently + 200-400mg currently controlled  \par - Myopathy:  mildly elevated CK max 259 7/20...  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  Hypothyroidism on replacement w/ nl TSH \par \par 3) Family planning:  Oral contraceptive use: changed brands to a low estrogen containing pill- currently off, not in relationship, denies sexual activity.  Discussed current reproductive guidelines \par  \par 4)  OA hips: primary vs. secondary. ? AVN (need updated eval). bilateral R> L w/ limited ROM and daily pain worse at times, doing well now \par Xray Hips:  moderate OA, etiology\par NOTE: \par Tx  acupuncture and PT w/ limited benefit \par \par \par Plan:\par 1.  Continue to f/u  pulmonary  progressive decrease in DLCO and noted GGO peripheral b/l LL. \par \par 2.  Continue f/u  (Dr Jeffrey Pate [phone]).. as needed, at least annually. \par \par 3.  May need to add tx given concerns for progressive decrease in DLCO and GGO.. inflammatory... await pulm eval/ review, refer to Dr. Mei - repeat PFTs few mnths. 2/22- will confer with team after updated studies... \par \par 4.  Continue  felodipine 10 mg, call immediately if any ulcerations present  \par \par 5.  ASA 81mg \par \par 6.  continue to work with derm\par \par 7.Is aware to call if there is any change in her underlying symptoms.\par \par 8.  Return 3 months \par \par \par \par \par \par

## 2021-12-22 NOTE — HISTORY OF PRESENT ILLNESS
[FreeTextEntry1] : 12/22/21 \par - functionally still exercising routinely strength training .. cardio 2 x wk (no change in tolerance though notes more difficulty when not c/w cardio- even 1 missed session). working FT... \par - Raynauds persists on 10 mg felodipine w/ some control but uncomfortable, few digital lesions... but nothing acute...new onset calcinosis- 2 lesions at base thumb each side- not painful but space occupying.. \par - energy level fine\par - notes some difficulty opening mouth fully... but stable...\par - GERD controlled using Pepcid at HS and/ omeprazole 40 mg am with HOB elevated routinely... \par - no other new studies since last visit... \par - labs 10/21 stable CBC, CMP... on low dose HCQ (200 mg=3 mg/kg)... \par - anxiety well controlled on current sertraline, working FT \par - vaccinations up to date, declines flu vaccine at this time.to include  BOOSter, COVID spike protein 5/21 > 250 \par - PFTs updated 3/29/21 \par - Updated Echo 6/11/21 nl RV/ LV size and function w/ no evidence effusion or PAH \par - Baseline Chest CTScan 3/21 + peripheral nodular GGO in LL with no lymphadenopathy.. needs updated PFTs now \par Labs 3/21 ESr 22 Myomarker 3 +   nl cmp, CBC ck, CRP, C3/4, w/ neg dsDNA, LAC/ APS, PCR\par - Baseline Chest CTScan:  + peripheral nodular GGO in lower lobes \par \par \par 1.  MCTD/Limited Scleroderma:  consisting of Raynaud's, GERD (presumed esophageal dysmotility), telangiectases, sclerodactyly, RNP Ab.  \par 2.  GERD. Pt on ranitidine. GERD symptoms on and off. No dysphagia. \par 3.  Hypothyroidism\par 4.  Myopathy: mild elevation of CKs on a couple of occasions\par 5.  Plaquenil use:  no ocular symptoms. \par 6.  Oral contraceptive use: changed brands to a low estrogen containing pill.\par 7.  Raynauds:  more problematic in the hands than feet. with pitting  . \par 9.  Avascular necrosis of hips:   bilateral.  moderate to severe R > L w/ advanced DJD   An x-ray was read as moderate OA of the hip (acetabular sclerosis and osteophytes).  \par 10. PNa 2017  Antibiotics were administered, and she improved.\par 12.  Resting tachycardia: no dyspnea or chest pain\par

## 2021-12-22 NOTE — PHYSICAL EXAM
[General Appearance - Alert] : alert [General Appearance - In No Acute Distress] : in no acute distress [General Appearance - Well Nourished] : well nourished [General Appearance - Well Developed] : well developed [General Appearance - Well-Appearing] : healthy appearing [Sclera] : the sclera and conjunctiva were normal [Extraocular Movements] : extraocular movements were intact [Strabismus] : no strabismus was seen [Outer Ear] : the ears and nose were normal in appearance [Examination Of The Oral Cavity] : the lips and gums were normal [Nasal Cavity] : the nasal mucosa and septum were normal [Oropharynx] : the oropharynx was normal [Neck Appearance] : the appearance of the neck was normal [Respiration, Rhythm And Depth] : normal respiratory rhythm and effort [Exaggerated Use Of Accessory Muscles For Inspiration] : no accessory muscle use [Auscultation Breath Sounds / Voice Sounds] : lungs were clear to auscultation bilaterally [Heart Rate And Rhythm] : heart rate was normal and rhythm regular [Heart Sounds] : normal S1 and S2 [Heart Sounds Gallop] : no gallops [Murmurs] : no murmurs [Heart Sounds Pericardial Friction Rub] : no pericardial rub [Edema] : there was no peripheral edema [Veins - Varicosity Changes] : there were no varicosital changes [Cervical Lymph Nodes Enlarged Posterior Bilaterally] : posterior cervical [Cervical Lymph Nodes Enlarged Anterior Bilaterally] : anterior cervical [Supraclavicular Lymph Nodes Enlarged Bilaterally] : supraclavicular [Abnormal Walk] : normal gait [Nail Clubbing] : no clubbing  or cyanosis of the fingernails [Musculoskeletal - Swelling] : no joint swelling seen [Motor Tone] : muscle strength and tone were normal [Skin Color & Pigmentation] : normal skin color and pigmentation [Skin Turgor] : normal skin turgor [] : no rash [Sensation] : the sensory exam was normal to light touch and pinprick [Motor Exam] : the motor exam was normal [Oriented To Time, Place, And Person] : oriented to person, place, and time [Impaired Insight] : insight and judgment were intact [Affect] : the affect was normal [FreeTextEntry1] : admits to anxiety & depression both well controlled at this time,

## 2021-12-28 ENCOUNTER — TRANSCRIPTION ENCOUNTER (OUTPATIENT)
Age: 31
End: 2021-12-28

## 2022-01-05 ENCOUNTER — RX RENEWAL (OUTPATIENT)
Age: 32
End: 2022-01-05

## 2022-01-06 ENCOUNTER — NON-APPOINTMENT (OUTPATIENT)
Age: 32
End: 2022-01-06

## 2022-01-12 LAB — SARS-COV-2 N GENE NPH QL NAA+PROBE: NOT DETECTED

## 2022-01-14 ENCOUNTER — APPOINTMENT (OUTPATIENT)
Dept: PULMONOLOGY | Facility: CLINIC | Age: 32
End: 2022-01-14
Payer: COMMERCIAL

## 2022-01-14 ENCOUNTER — APPOINTMENT (OUTPATIENT)
Dept: INTERNAL MEDICINE | Facility: CLINIC | Age: 32
End: 2022-01-14
Payer: COMMERCIAL

## 2022-01-14 VITALS
HEIGHT: 62 IN | DIASTOLIC BLOOD PRESSURE: 80 MMHG | WEIGHT: 140 LBS | BODY MASS INDEX: 25.76 KG/M2 | OXYGEN SATURATION: 99 % | TEMPERATURE: 98.1 F | HEART RATE: 104 BPM | SYSTOLIC BLOOD PRESSURE: 118 MMHG

## 2022-01-14 VITALS — WEIGHT: 143 LBS | BODY MASS INDEX: 26.31 KG/M2 | HEIGHT: 62 IN

## 2022-01-14 PROCEDURE — 94010 BREATHING CAPACITY TEST: CPT

## 2022-01-14 PROCEDURE — 85018 HEMOGLOBIN: CPT | Mod: QW

## 2022-01-14 PROCEDURE — 99395 PREV VISIT EST AGE 18-39: CPT

## 2022-01-14 PROCEDURE — 94729 DIFFUSING CAPACITY: CPT

## 2022-01-14 PROCEDURE — 94727 GAS DIL/WSHOT DETER LNG VOL: CPT

## 2022-01-14 NOTE — PLAN
[FreeTextEntry1] : Patient will go for outside fasting blood work\par Were completed for her job\par Patient will continue with healthy diet and exercise\par She will try to avoid skipping meals as she tends to skip breakfast and lunch\par Will be advised blood work results once reviewed\par Continue her routine follow-ups with rheumatology and pulmonary\par Patient will make appointment with GYN as she has not been there in several years and patient has been given some referrals

## 2022-01-14 NOTE — HISTORY OF PRESENT ILLNESS
[FreeTextEntry1] : 30 y/o female presents to the office today for a physical exam [de-identified] : Patient presents to the office today for physical exam and will have outside fasting blood work\par She has been routinely following up with rheumatology\par She has just seen pulmonary this morning for pulmonary function testing\par She has been doing well with diet\par Exercising cardiovascular and weight lifting 4-5 times a week

## 2022-01-20 LAB
APPEARANCE: CLEAR
BILIRUBIN URINE: NEGATIVE
BLOOD URINE: NORMAL
C3 SERPL-MCNC: 126 MG/DL
C4 SERPL-MCNC: 21 MG/DL
CK SERPL-CCNC: 178 U/L
COLOR: COLORLESS
COVID-19 SPIKE DOMAIN ANTIBODY INTERPRETATION: POSITIVE
CREAT SPEC-SCNC: 15 MG/DL
CREAT/PROT UR: NORMAL RATIO
CRP SERPL-MCNC: <3 MG/L
DSDNA AB SER-ACNC: <12 IU/ML
ERYTHROCYTE [SEDIMENTATION RATE] IN BLOOD BY WESTERGREN METHOD: 23 MM/HR
GLUCOSE QUALITATIVE U: NEGATIVE
KETONES URINE: NEGATIVE
LEUKOCYTE ESTERASE URINE: NEGATIVE
NITRITE URINE: NEGATIVE
PH URINE: 7.5
PROT UR-MCNC: <4 MG/DL
PROTEIN URINE: NEGATIVE
SARS-COV-2 AB SERPL IA-ACNC: >250 U/ML
SPECIFIC GRAVITY URINE: 1
UROBILINOGEN URINE: NORMAL

## 2022-01-24 ENCOUNTER — TRANSCRIPTION ENCOUNTER (OUTPATIENT)
Age: 32
End: 2022-01-24

## 2022-01-24 LAB
ALBUMIN SERPL ELPH-MCNC: 4.7 G/DL
ALP BLD-CCNC: 96 U/L
ALT SERPL-CCNC: 23 U/L
ANION GAP SERPL CALC-SCNC: 13 MMOL/L
APPEARANCE: CLEAR
AST SERPL-CCNC: 34 U/L
BACTERIA: NEGATIVE
BASOPHILS # BLD AUTO: 0.02 K/UL
BASOPHILS NFR BLD AUTO: 0.5 %
BILIRUB SERPL-MCNC: 0.3 MG/DL
BILIRUBIN URINE: NEGATIVE
BLOOD URINE: NORMAL
BUN SERPL-MCNC: 10 MG/DL
CALCIUM SERPL-MCNC: 9.6 MG/DL
CHLORIDE SERPL-SCNC: 104 MMOL/L
CHOLEST SERPL-MCNC: 137 MG/DL
CO2 SERPL-SCNC: 24 MMOL/L
COLOR: COLORLESS
CREAT SERPL-MCNC: 0.58 MG/DL
EOSINOPHIL # BLD AUTO: 0.11 K/UL
EOSINOPHIL NFR BLD AUTO: 2.5 %
ESTIMATED AVERAGE GLUCOSE: 105 MG/DL
GLUCOSE QUALITATIVE U: NEGATIVE
GLUCOSE SERPL-MCNC: 99 MG/DL
HBA1C MFR BLD HPLC: 5.3 %
HCT VFR BLD CALC: 40 %
HDLC SERPL-MCNC: 43 MG/DL
HGB BLD-MCNC: 12.3 G/DL
HYALINE CASTS: 0 /LPF
IMM GRANULOCYTES NFR BLD AUTO: 0.2 %
KETONES URINE: NEGATIVE
LDLC SERPL CALC-MCNC: 84 MG/DL
LEUKOCYTE ESTERASE URINE: NEGATIVE
LYMPHOCYTES # BLD AUTO: 1.67 K/UL
LYMPHOCYTES NFR BLD AUTO: 38.2 %
M TB IFN-G BLD-IMP: NEGATIVE
MAN DIFF?: NORMAL
MCHC RBC-ENTMCNC: 26.3 PG
MCHC RBC-ENTMCNC: 30.8 GM/DL
MCV RBC AUTO: 85.7 FL
MICROSCOPIC-UA: NORMAL
MONOCYTES # BLD AUTO: 0.37 K/UL
MONOCYTES NFR BLD AUTO: 8.5 %
NEUTROPHILS # BLD AUTO: 2.19 K/UL
NEUTROPHILS NFR BLD AUTO: 50.1 %
NITRITE URINE: NEGATIVE
NONHDLC SERPL-MCNC: 95 MG/DL
PH URINE: 7
PLATELET # BLD AUTO: 363 K/UL
POTASSIUM SERPL-SCNC: 4.6 MMOL/L
PROT SERPL-MCNC: 8.2 G/DL
PROTEIN URINE: NEGATIVE
QUANTIFERON TB PLUS MITOGEN MINUS NIL: 9.14 IU/ML
QUANTIFERON TB PLUS NIL: 0.01 IU/ML
QUANTIFERON TB PLUS TB1 MINUS NIL: 0 IU/ML
QUANTIFERON TB PLUS TB2 MINUS NIL: 0 IU/ML
RBC # BLD: 4.67 M/UL
RBC # FLD: 13.3 %
RED BLOOD CELLS URINE: 0 /HPF
SODIUM SERPL-SCNC: 140 MMOL/L
SPECIFIC GRAVITY URINE: 1
SQUAMOUS EPITHELIAL CELLS: 1 /HPF
T4 FREE SERPL-MCNC: 1.6 NG/DL
TRIGL SERPL-MCNC: 56 MG/DL
TSH SERPL-ACNC: 0.26 UIU/ML
UROBILINOGEN URINE: NORMAL
VIT B12 SERPL-MCNC: 820 PG/ML
WBC # FLD AUTO: 4.37 K/UL
WHITE BLOOD CELLS URINE: 1 /HPF

## 2022-01-28 ENCOUNTER — RX CHANGE (OUTPATIENT)
Age: 32
End: 2022-01-28

## 2022-02-03 ENCOUNTER — APPOINTMENT (OUTPATIENT)
Dept: CARDIOLOGY | Facility: CLINIC | Age: 32
End: 2022-02-03
Payer: COMMERCIAL

## 2022-02-03 VITALS
WEIGHT: 146 LBS | SYSTOLIC BLOOD PRESSURE: 134 MMHG | BODY MASS INDEX: 26.87 KG/M2 | HEART RATE: 99 BPM | DIASTOLIC BLOOD PRESSURE: 82 MMHG | TEMPERATURE: 98.4 F | HEIGHT: 62 IN | OXYGEN SATURATION: 98 %

## 2022-02-03 PROCEDURE — 93000 ELECTROCARDIOGRAM COMPLETE: CPT

## 2022-02-03 PROCEDURE — 99214 OFFICE O/P EST MOD 30 MIN: CPT

## 2022-02-06 ENCOUNTER — NON-APPOINTMENT (OUTPATIENT)
Age: 32
End: 2022-02-06

## 2022-02-06 NOTE — HISTORY OF PRESENT ILLNESS
[FreeTextEntry1] : 32-year-old female seen today due to mixed connective tissue disease, admitted scleroderma with esophageal dysmotility and Raynaud's.  She is also having telangiectasias and sclerodactyly.  She denies having any dysphagia.\par She has no new symptoms no chest pain or shortness of breath.  She will get repeat CAT scan for further evaluation of ILD.  She does not exercise.\par PFTs performed January 14, 2022 demonstrated F EDV 1/FVC 87%, FVC 2.9 L FIF 55.46.\par \par Echocardiogram performed 6/16/2021 with no evidence of pulmonary hypertension, normal LVEF, mild MR.

## 2022-02-06 NOTE — ASSESSMENT
[FreeTextEntry1] : 32-year-old female with mixed connective tissue disease, interstitial lung disease, Covid infection last year who is here for follow-up due to her scleroderma.  She has esophageal dysmotility and will follow up with GI\par .  She is on PPI and famotidine\par She is a scheduled to have repeat CAT scan of the chest to see if interstitial lung disease has resolved or not\par Her last echocardiogram about a year ago did not show any evidence of pulmonary hypertension or right-sided chamber disease.\par She is followed by rheumatology as well.\par Would like her to have repeat echo sometimes in July.  She will see me at that time get an echocardiogram prior to the visit.\par

## 2022-02-06 NOTE — REVIEW OF SYSTEMS
[Weight Gain (___ Lbs)] : no recent weight gain [Weight Loss (___ Lbs)] : no recent weight loss [Earache] : no earache [Dyspnea on exertion] : not dyspnea during exertion [Leg Claudication] : no intermittent leg claudication [Palpitations] : no palpitations [Abdominal Pain] : abdominal pain [Dysphagia] : dysphagia [Dysuria] : no dysuria [Pelvic Pain] : no pelvic pain [Confusion] : no confusion was observed [Under Stress] : not under stress [Easy Bleeding] : no tendency for easy bleeding

## 2022-02-06 NOTE — CARDIOLOGY SUMMARY
[de-identified] : Normal sinus rhythm normal axis timetables delayed RS transition anterior precordial leads no ST-T changes.

## 2022-02-06 NOTE — PHYSICAL EXAM
[Normal S1, S2] : normal S1, S2 [No Edema] : no edema [Normal] : alert and oriented, normal memory [de-identified] : 3 out of 6 systolic murmur left sternal border

## 2022-02-10 ENCOUNTER — APPOINTMENT (OUTPATIENT)
Dept: DERMATOLOGY | Facility: CLINIC | Age: 32
End: 2022-02-10
Payer: COMMERCIAL

## 2022-02-10 PROCEDURE — 17106 DSTR CUT VSC PRLF LES<10SQCM: CPT | Mod: 79

## 2022-03-01 ENCOUNTER — TRANSCRIPTION ENCOUNTER (OUTPATIENT)
Age: 32
End: 2022-03-01

## 2022-03-01 LAB
T4 FREE SERPL-MCNC: 1.6 NG/DL
TSH SERPL-ACNC: 0.28 UIU/ML

## 2022-03-04 ENCOUNTER — APPOINTMENT (OUTPATIENT)
Dept: CT IMAGING | Facility: CLINIC | Age: 32
End: 2022-03-04
Payer: COMMERCIAL

## 2022-03-04 ENCOUNTER — OUTPATIENT (OUTPATIENT)
Dept: OUTPATIENT SERVICES | Facility: HOSPITAL | Age: 32
LOS: 1 days | End: 2022-03-04
Payer: COMMERCIAL

## 2022-03-04 DIAGNOSIS — Z00.00 ENCOUNTER FOR GENERAL ADULT MEDICAL EXAMINATION WITHOUT ABNORMAL FINDINGS: ICD-10-CM

## 2022-03-04 PROCEDURE — 71250 CT THORAX DX C-: CPT

## 2022-03-04 PROCEDURE — 71250 CT THORAX DX C-: CPT | Mod: 26

## 2022-03-09 ENCOUNTER — NON-APPOINTMENT (OUTPATIENT)
Age: 32
End: 2022-03-09

## 2022-03-11 ENCOUNTER — APPOINTMENT (OUTPATIENT)
Dept: PULMONOLOGY | Facility: CLINIC | Age: 32
End: 2022-03-11
Payer: COMMERCIAL

## 2022-03-11 VITALS
RESPIRATION RATE: 16 BRPM | SYSTOLIC BLOOD PRESSURE: 124 MMHG | HEIGHT: 62 IN | DIASTOLIC BLOOD PRESSURE: 72 MMHG | BODY MASS INDEX: 26.87 KG/M2 | WEIGHT: 146 LBS

## 2022-03-11 VITALS — OXYGEN SATURATION: 95 % | HEART RATE: 87 BPM

## 2022-03-11 PROCEDURE — 99214 OFFICE O/P EST MOD 30 MIN: CPT

## 2022-03-11 NOTE — DISCUSSION/SUMMARY
[FreeTextEntry1] : Mixed CT/Scleroderma, slowly progressive ILD, dilated esophagus\par At baseline, no pulmonary or GI complaints, takes nocturnal precautions\par last echocardiogram without  any pulmonary hypertension - 6/21\par Centrilobular GG nodules predominantly lung bases, progressed from 2020 - stable 3/22-3/21\par also reticular scarring upper lobes\par Suspect combination of  esophageal reflux and Scleroderma related, no Environmental exposures\par Currently essentially asymptomatic, exercises regularly, hx Avascular necrosis\par taking aspiration precautions, asymptomatic, using Proton pump ( day) and H2 night\par PFTs are actually mildly improved DLCO and TLC from 3/21, TLC normal, FVC normal\par Will discussed with Rheumatology, Needs input with Dr Marvin for Reflux\par Fully vaccinated x 3\par 6 months or sooner if needed\par \par

## 2022-03-11 NOTE — PROCEDURE
[FreeTextEntry1] : CT scans reviewed and compared 3/22-3/21- stable NSIP like ILD\par PFts 1/22 : Normal TLC and spirometry, \par

## 2022-03-11 NOTE — CONSULT LETTER
[Dear  ___] : Dear  [unfilled], [Consult Letter:] : I had the pleasure of evaluating your patient, [unfilled]. [Please see my note below.] : Please see my note below. [Sincerely,] : Sincerely, [FreeTextEntry3] : Cj Mei DO [facility]\par Pulmonary Critical Care\par Director Pulmonary Division\par Medical Director Respiratory Therapy\par [facility]\par \par

## 2022-03-24 ENCOUNTER — NON-APPOINTMENT (OUTPATIENT)
Age: 32
End: 2022-03-24

## 2022-03-25 ENCOUNTER — APPOINTMENT (OUTPATIENT)
Dept: RHEUMATOLOGY | Facility: CLINIC | Age: 32
End: 2022-03-25
Payer: COMMERCIAL

## 2022-03-25 VITALS
DIASTOLIC BLOOD PRESSURE: 70 MMHG | HEART RATE: 104 BPM | WEIGHT: 144 LBS | OXYGEN SATURATION: 98 % | HEIGHT: 62 IN | SYSTOLIC BLOOD PRESSURE: 120 MMHG | TEMPERATURE: 97.9 F | BODY MASS INDEX: 26.5 KG/M2

## 2022-03-25 PROCEDURE — 99215 OFFICE O/P EST HI 40 MIN: CPT

## 2022-03-25 NOTE — REVIEW OF SYSTEMS
[Heartburn] : heartburn [As Noted in HPI] : as noted in HPI [Anxiety] : anxiety [Negative] : Heme/Lymph [FreeTextEntry2] : stable wt  [de-identified] : laser tx for telangiectasias  [de-identified] : mild - appropriate

## 2022-03-25 NOTE — DATA REVIEWED
[FreeTextEntry1] : Labs: \par 3/21 ESr 22 Myomarker 3 +   nl cmp, CBC ck, CRP, C3/4, w/ neg dsDNA, LAC/ APS, PCR\par \par \par               9/18/18 8/14/19 2021 2022\par PFTs  \par FVC         88->             90->        85->    88\par TLC          98->             84->        80-)    86\par DLCO       68->             70->        58-)    68\par \par CTScan 3/22 stable GGO still present, NSIP pattern c/w CTD, no lymphadenopathy\par \par CTScan chest 3/22/21 peripheral nodular ggo LLs w/ no lymphadenopathy\par \par Echo:  10/16/20 nl w/ no mention PAH.. but RA/RV nl in size\par \par Xray b/l hips 3/21 symmetric djd spurring b/l  along articular margins without  gross JS loss without obvious evidence of AVN

## 2022-03-25 NOTE — ASSESSMENT
[FreeTextEntry1] : 32 yo wf with long hx Limited SSc, MCTD + RNP w/  advanced DJD b/l hips ? AVN, advanced raynauds, telangiectasias \par \par 1)  MCTD/Limited Scleroderma:  SHERI 1:2560S, + RNP > 8.0, SM 3.8, + RF 45 (neg CCP) w/ CK > 200 (intermittently) but no  overt weakness now or in past.   All other serologies to include APS neg w/ nl C3/4, dsDNA, SSA/B \par Primary sx:  raynauds, sclerodactyly, telangiectasias, NEW onset calcinosis trivedi surface both hands 12/21.. in addition to low grade fevers, arthralgias / myalgias and fatigue when "flaring". Long standing use of HCQ (200 mg= 3.07mg/kg), well tolerated with routine opth exam fine 9/21 s/p lasik sx, with intermittent bouts "conjunctivitis"- painful/ red in one or both eyes- not present now and no evidence of inflammatory eye disease or excessive dryness. \par Currently reports feeling well controlled and functionally active, exercising daily with no limitations- cardio/ str training > 60 mn daily ,  \par Dx w/ limited SSc by clinical exam but serology for REGGIE neg repeatedly- MCTD\par - Raynaud's moderate to severe at times, tried and failed to tolerate nifedipine/ amlodipine now on felodipine at 10 mg and doing well. Noted digital pitting but no report of active ulcerations - though dry ulcer on L5 distally.. and evidence similar on others.. nothing for past few ys.. new one now.   \par - GERD (presumed esophageal dysmotility) severe at times w/ dx H Pylori 12/20 completed triple tx, not well tolerated, now sx better on daily PPI fs omeprazole once daily altternating w/ H2. Denies dysphagia now or in past but concerned about possible aspiration and advised sleeping HOB elevated.. and continued  tx H2 at HS and PPI in am.. . Last EGD 12/20 (mild gastritis but otherwise neg- evidence of dysmotility??), needs updated eval.  Refer to Dr. Red\par - Telangiectases/ Calcinosis:  moderate to severe over face, chest s/p laser tx in past and again working with Dr. Rutherford now.... pleased with outcome.  Calcinosis noted 12/21- stable not painful.. if progresses b/l hands.. will refer to OT for hand tx now... \par - Sclerodactyly:  moderate in limited pattern, distal to PIPs and around mouth..stable x many ys now has not progressed. \par - Cardiopulmonary:  Routine PFTs w/ DLCO 79-> 74-> 68 (2018)-> 70 (2019)-> 58 (3/21) w/ volumes relatively well preserved over time... but no reported hx of pleuropericardial effusion and completely denies SOB/JENSEN  cough or change in activity tolerance (exercises routinely).  Resting tachycardia but w/out SOB/ CP.  Baseline Chest CTScan 3/21 + peripheral nodular GGO in LL w/ no lymphadenopathy and echo 6/21 essentially nl w/ NO evidence of PAH.  Updated Ctscan 3/22 unchanged.. If DLCO remains low in future may need R heart cath but not indicated at this time. F/u scheduled with both pulm/ card. Given relative stability and decreased DLCO.. discussed w/ Dr. Mei need to consider trial of MMF ideally to improve / but also stabilize any changes.  \par Reviewed R/ B/SE and understands, will call if any issues or concerns, literature provided and reviewed.  All questions answered \par - SerositiS:  elevated LFTs to 900 in past x 1 but repeat studies nl, etiology unclear. No renal dysfunction, no pleural/pericardial effusions.   having experienced intermittent feverish feeling, myalgias, and fatigue\par Proteinuria:  intermittently + 200-400mg currently controlled  \par - Myopathy:  mildly elevated CK max 259 7/20...  w/ no previous weakness now or in past.\par NOTE: \par 3/21 Myomarker 3 NEG\par 2019 mild proteinuria PCR 0.4-> nl now \par neg REGGIE, Scl70, RNA polymerase, ANCA \par - 2017 adm to [place] w/ PNA + response to antibiotics w/ + response immediately\par - 2015/2016 she went off Plaquenil but restarted it because of the Raynaud's/digital pits, restarted and sx stable\par \par 2).  Hypothyroidism on replacement w/ nl TSH \par \par 3) Family planning:  Oral contraceptive use: changed brands to a low estrogen containing pill- currently off, not in relationship, denies sexual activity.  Discussed current reproductive guidelines .... and is interested in pregnancy in next few ys.  Discussed use of MMF and need to stop 6-8 wks before conception... understood clearly, will contact \par  \par 4)  OA hips: primary vs. secondary. ? AVN (need updated eval). bilateral R> L w/ limited ROM and daily pain worse at times, doing well now \par Xray Hips:  moderate OA, etiology\par NOTE: \par Tx  acupuncture and PT w/ limited benefit \par \par \par Plan:\par 1.  Continue HCQ... 200 mg routinely... \par \par 2.  Start MMF (mycophenolate= Cellcept)... 500 mg tabs once daily x 1 wk, if ok increase to twice, every week increase by 1 tab till on 4 tabs daily (2 twice daily).. \par - recommended stopping 6 weeks prior to conception only.. then assess for disease stability. \par \par 3. Labs monthly \par \par 2.  Continue f/u  (Dr Jeffrey Pate [phone]).. as needed, at least annually. .. \par \par 3.  Continue  felodipine 10 mg, call immediately if any ulcerations present  \par \par 5.  ASA 81mg ... may need to reconsider.. if you continue to have ulcerations.. should restart \par \par 6.  continue to work with derm\par \par 7.Is aware to call if there is any change in her underlying symptoms.\par \par 8.  Will order Evusheld for you:  monoclonal antibody.. to prevent COVID infection \par \par 9. Currently FDA suggests immocompromised people need 4 th vaccine (maybe in next 6 m)\par \par 8.  Return 3 months \par \par \par \par \par \par

## 2022-03-25 NOTE — HISTORY OF PRESENT ILLNESS
[FreeTextEntry1] : 3/25/22\par - doing well overall, subjectively no cardiopulmonary sx. still exercising routinely, and working FT  \par - most recent echo 6/21 NO evidence of PAH\par - most recent PFTs stable essentially over past \par - GERD chronic, sleeps with HOB up and takes omeprozole or Pepcid routinely\par - raynauds persists on 10 mg felodipine w/ small ulcerations- dry (see images).. not painful \par - telangiectasias persist but working w/ derm to manage severity... laser tx + response.  \par - no recent calcinosis...\par - notes some difficulty opening mouth fully... but stable...\par - hip ROM limited stable.  \par - Family planning:  not actively pursuing but would like to.. needs to consider this\par - labs 2/22  stable CBC (WBC 4.5-6), CMP,  (stable) nl ESR/ CRP, C3/4, neg dsDNA still... on low dose HCQ (200 mg=3 mg/kg)... \par - anxiety well controlled on current sertraline, working FT \par - vaccinations up to date, declines flu vaccine at this time.to include  BOOSter, COVID spike protein 5/21 > 250 \par - PFTs updated 3/29/21 \par - Updated Echo 6/11/21 nl RV/ LV size and function w/ no evidence effusion or PAH \par - Baseline Chest CTScan 3/21 + peripheral nodular GGO in LL with no lymphadenopathy.. Stable repeat 3/22.. GGO still present.. \par Labs 3/21 ESr 22 Myomarker 3 +   nl cmp, CBC ck, CRP, C3/4, w/ neg dsDNA, LAC/ APS, PCR\par \par \par \par 1.  MCTD/Limited Scleroderma:  consisting of Raynaud's, GERD (presumed esophageal dysmotility), telangiectases, sclerodactyly, RNP Ab.  \par 2.  GERD. Pt on ranitidine. GERD symptoms on and off. No dysphagia. \par 3.  Hypothyroidism\par 4.  Myopathy: mild elevation of CKs on a couple of occasions\par 5.  Plaquenil use:  no ocular symptoms. \par 6.  Oral contraceptive use: changed brands to a low estrogen containing pill.\par 7.  Raynauds:  more problematic in the hands than feet. with pitting  . \par 9.  Avascular necrosis of hips:   bilateral.  moderate to severe R > L w/ advanced DJD   An x-ray was read as moderate OA of the hip (acetabular sclerosis and osteophytes).  \par 10. PNa 2017  Antibiotics were administered, and she improved.\par 12.  Resting tachycardia: no dyspnea or chest pain\par

## 2022-04-09 ENCOUNTER — OUTPATIENT (OUTPATIENT)
Dept: OUTPATIENT SERVICES | Facility: HOSPITAL | Age: 32
LOS: 1 days | End: 2022-04-09

## 2022-04-09 ENCOUNTER — APPOINTMENT (OUTPATIENT)
Age: 32
End: 2022-04-09

## 2022-04-09 VITALS
DIASTOLIC BLOOD PRESSURE: 84 MMHG | SYSTOLIC BLOOD PRESSURE: 131 MMHG | TEMPERATURE: 98 F | OXYGEN SATURATION: 99 % | HEART RATE: 96 BPM | RESPIRATION RATE: 17 BRPM

## 2022-04-09 VITALS
SYSTOLIC BLOOD PRESSURE: 119 MMHG | RESPIRATION RATE: 18 BRPM | HEART RATE: 89 BPM | OXYGEN SATURATION: 97 % | DIASTOLIC BLOOD PRESSURE: 86 MMHG | TEMPERATURE: 98 F

## 2022-04-09 DIAGNOSIS — J84.9 INTERSTITIAL PULMONARY DISEASE, UNSPECIFIED: ICD-10-CM

## 2022-04-09 DIAGNOSIS — G72.9 MYOPATHY, UNSPECIFIED: ICD-10-CM

## 2022-04-09 DIAGNOSIS — Z23 ENCOUNTER FOR IMMUNIZATION: ICD-10-CM

## 2022-04-09 DIAGNOSIS — M35.1 OTHER OVERLAP SYNDROMES: ICD-10-CM

## 2022-04-14 ENCOUNTER — APPOINTMENT (OUTPATIENT)
Dept: DERMATOLOGY | Facility: CLINIC | Age: 32
End: 2022-04-14
Payer: COMMERCIAL

## 2022-04-14 PROCEDURE — 17106 DSTR CUT VSC PRLF LES<10SQCM: CPT | Mod: 79

## 2022-05-13 ENCOUNTER — RX RENEWAL (OUTPATIENT)
Age: 32
End: 2022-05-13

## 2022-05-25 LAB
ALBUMIN SERPL ELPH-MCNC: 4.9 G/DL
ALP BLD-CCNC: 95 U/L
ALT SERPL-CCNC: 30 U/L
ANION GAP SERPL CALC-SCNC: 15 MMOL/L
AST SERPL-CCNC: 33 U/L
BASOPHILS # BLD AUTO: 0.04 K/UL
BASOPHILS NFR BLD AUTO: 0.6 %
BILIRUB SERPL-MCNC: 0.3 MG/DL
BUN SERPL-MCNC: 13 MG/DL
CALCIUM SERPL-MCNC: 10 MG/DL
CHLORIDE SERPL-SCNC: 102 MMOL/L
CK SERPL-CCNC: 217 U/L
CO2 SERPL-SCNC: 22 MMOL/L
CREAT SERPL-MCNC: 0.55 MG/DL
EGFR: 126 ML/MIN/1.73M2
EOSINOPHIL # BLD AUTO: 0.14 K/UL
EOSINOPHIL NFR BLD AUTO: 2.2 %
ERYTHROCYTE [SEDIMENTATION RATE] IN BLOOD BY WESTERGREN METHOD: 23 MM/HR
GLUCOSE SERPL-MCNC: 78 MG/DL
HCT VFR BLD CALC: 39.7 %
HGB BLD-MCNC: 12.5 G/DL
IMM GRANULOCYTES NFR BLD AUTO: 0.2 %
LYMPHOCYTES # BLD AUTO: 1.74 K/UL
LYMPHOCYTES NFR BLD AUTO: 27.2 %
MAN DIFF?: NORMAL
MCHC RBC-ENTMCNC: 26.8 PG
MCHC RBC-ENTMCNC: 31.5 GM/DL
MCV RBC AUTO: 85 FL
MONOCYTES # BLD AUTO: 0.71 K/UL
MONOCYTES NFR BLD AUTO: 11.1 %
NEUTROPHILS # BLD AUTO: 3.75 K/UL
NEUTROPHILS NFR BLD AUTO: 58.7 %
PLATELET # BLD AUTO: 360 K/UL
POTASSIUM SERPL-SCNC: 4.1 MMOL/L
PROT SERPL-MCNC: 8.1 G/DL
RBC # BLD: 4.67 M/UL
RBC # FLD: 13.6 %
SODIUM SERPL-SCNC: 139 MMOL/L
WBC # FLD AUTO: 6.39 K/UL

## 2022-06-02 ENCOUNTER — APPOINTMENT (OUTPATIENT)
Dept: DERMATOLOGY | Facility: CLINIC | Age: 32
End: 2022-06-02
Payer: COMMERCIAL

## 2022-06-02 PROCEDURE — 17106 DSTR CUT VSC PRLF LES<10SQCM: CPT | Mod: 79

## 2022-06-27 LAB — ERYTHROCYTE [SEDIMENTATION RATE] IN BLOOD BY WESTERGREN METHOD: 7 MM/HR

## 2022-06-28 ENCOUNTER — RX RENEWAL (OUTPATIENT)
Age: 32
End: 2022-06-28

## 2022-07-02 ENCOUNTER — NON-APPOINTMENT (OUTPATIENT)
Age: 32
End: 2022-07-02

## 2022-07-05 ENCOUNTER — APPOINTMENT (OUTPATIENT)
Dept: RHEUMATOLOGY | Facility: CLINIC | Age: 32
End: 2022-07-05

## 2022-07-05 VITALS
BODY MASS INDEX: 27.05 KG/M2 | TEMPERATURE: 96.4 F | HEART RATE: 60 BPM | WEIGHT: 147 LBS | DIASTOLIC BLOOD PRESSURE: 80 MMHG | HEIGHT: 62 IN | OXYGEN SATURATION: 96 % | SYSTOLIC BLOOD PRESSURE: 130 MMHG

## 2022-07-05 PROCEDURE — 99214 OFFICE O/P EST MOD 30 MIN: CPT

## 2022-07-05 NOTE — HISTORY OF PRESENT ILLNESS
[FreeTextEntry1] : 7/5/22\par - now on MMF FS since 4/22 actually tolerated well, no SE/ tox.. labs 5/22 w/ nl CBC, low ESR/ CRP .. CK still mildly elevated but no weakness and continues on low dose HCQ (200 mg=3 mg/kg)... \par - denies SOB/ JENSEN or cough.. \par - updated PFTs/ Echo scheduled in next few mnths\par - Raynauds better on higher dose felodipine 10 mg daily.. still distal pitting but no new lesions.. pain minimal\par - mild decrease in sensation of stiffness in hands \par - GERD chronic, sleeps with HOB up and takes omeprozole or Pepcid routinely.. (2 wks H2/ alt 2 wk PPI), scheduled to see Dr Red next fall). \par - telangiectasias persist but working w/ derm to manage severity... laser tx + response face.. many on chest not addressed .  \par - no recent calcinosis...\par - notes some difficulty opening mouth fully... but stable...\par - hip ROM limited stable.  \par - Family planning:  not actively pursuing but would like to.. needs to consider this. \par - anxiety well controlled on current sertraline, working FT \par - vaccinations up to date, declines flu vaccine at this time.to include  BOOSter, COVID spike protein 5/21 > 250 -> and given Evusheld (high exposure at work and has not had infection). NEeds shingrix and should also get COVID booster.. \par - Baseline Chest CTScan 3/21 + peripheral nodular GGO in LL with no lymphadenopathy.. Stable repeat 3/22.. GGO still present.. \par \par \par \par \par 1.  MCTD/Limited Scleroderma:  consisting of Raynaud's, GERD (presumed esophageal dysmotility), telangiectases, sclerodactyly, RNP Ab.  \par 2.  GERD. Pt on ranitidine. GERD symptoms on and off. No dysphagia. \par 3.  Hypothyroidism\par 4.  Myopathy: mild elevation of CKs on a couple of occasions\par 5.  Plaquenil use:  no ocular symptoms. \par 6.  Oral contraceptive use: changed brands to a low estrogen containing pill.\par 7.  Raynauds:  more problematic in the hands than feet. with pitting  . \par 9.  Avascular necrosis of hips:   bilateral.  moderate to severe R > L w/ advanced DJD   An x-ray was read as moderate OA of the hip (acetabular sclerosis and osteophytes).  \par 10. PNa 2017  Antibiotics were administered, and she improved.\par 12.  Resting tachycardia: no dyspnea or chest pain\par

## 2022-07-05 NOTE — ASSESSMENT
[FreeTextEntry1] : 31 yo wf with long hx Limited SSc, MCTD + RNP w/  advanced DJD b/l hips ? AVN, advanced raynauds, telangiectasias \par \par 1)  MCTD/Limited Scleroderma:  SHERI 1:2560S, + RNP > 8.0, SM 3.8, + RF 45 (neg CCP) w/ CK > 200 (intermittently) but no  overt weakness now or in past.   All other serologies to include APS neg w/ nl C3/4, dsDNA, SSA/B \par Primary sx:  raynauds, sclerodactyly, telangiectasias, NEW onset calcinosis trivedi surface both hands 12/21.. in addition to low grade fevers, arthralgias / myalgias and fatigue when "flaring". Long standing use of HCQ (200 mg= 3.07mg/kg) and MMF now 3 gm daily (4/22), well tolerated with routine opth exam fine 3/22  s/p lasik sx, with intermittent bouts "conjunctivitis"- painful/ red in one or both eyes- not present now and no evidence of inflammatory eye disease or excessive dryness. \par Currently reports feeling well controlled and functionally active, exercising daily with no limitations- cardio/ str training > 60 mn daily ,  \par Dx w/ limited SSc by clinical exam but serology for REGGIE neg repeatedly- MCTD\par - Raynaud's moderate to severe at times, tried and failed to tolerate nifedipine/ amlodipine now on felodipine at 10 mg and doing well. Noted digital pitting but no report of active ulcerations - though dry ulcer on L5 distally.. and evidence similar on others.. nothing for past few ys.. new one now.   Sclerodactyly slightly better today, ?? I think.. \par - GERD (presumed esophageal dysmotility) severe at times w/ dx H Pylori 12/20 completed triple tx, not well tolerated, now sx better on daily PPI fs omeprazole once daily altternating w/ H2. Denies dysphagia now or in past but concerned about possible aspiration and advised sleeping HOB elevated.. and continued  tx H2 at HS and PPI in am.. . Last EGD 12/20 (mild gastritis but otherwise neg- evidence of dysmotility??), needs updated eval.  Refer to Dr. Red appt 10/22 \par - Telangiectases/ Calcinosis:  moderate to severe over face, chest s/p laser tx in past and again working with Dr. Rutherford now.... pleased with outcome.  Calcinosis noted 12/21- stable not painful.. if progresses b/l hands.. will refer to OT for hand tx as needed \par - Sclerodactyly:  moderate in limited pattern, distal to PIPs and around mouth (sl progression)..stable x many ys and hands look sl better . \par - Cardiopulmonary:  Routine PFTs w/ DLCO 79-> 74-> 68 (2018)-> 70 (2019)-> 58 (3/21) w/ volumes relatively well preserved over time... but no reported hx of pleuropericardial effusion and completely denies SOB/JENSEN  cough or change in activity tolerance (exercises routinely).  Resting tachycardia but w/out SOB/ CP.  Baseline Chest CTScan 3/21 + peripheral nodular GGO in LL w/ no lymphadenopathy and echo 6/21 essentially nl w/ NO evidence of PAH.  \par Updated Ctscan 3/22 unchanged.. \par Updated PFTs needed now on MMF 3 g daily, ideally note improvement  \par - SerositiS:  elevated LFTs to 900 in past x 1 but repeat studies nl, etiology unclear. No renal dysfunction, no pleural/pericardial effusions.   \par Proteinuria:  intermittently + 200-400mg currently controlled  \par - Myopathy:  mildly elevated CK max 259 7/20...  w/ no previous weakness now or in past still \par NOTE: \par 3/21 Myomarker 3 NEG\par 2019 mild proteinuria PCR 0.4-> nl now \par neg REGGIE, Scl70, RNA polymerase, ANCA \par - 2017 adm to [place] w/ PNA + response to antibiotics w/ + response immediately\par - 2015/2016 she went off Plaquenil but restarted it because of the Raynaud's/digital pits, restarted and sx stable\par \par 2).  Hypothyroidism on replacement w/ nl TSH \par \par 3) Family planning:  Oral contraceptive use: changed brands to a low estrogen containing pill- currently off, not in relationship, denies sexual activity.  Discussed current reproductive guidelines .... and is interested in pregnancy in next few ys.  Discussed use of MMF and need to stop 6-8 wks before conception... understood clearly, will contact \par  \par 4)  OA hips: primary vs. secondary. ? AVN (need updated eval). bilateral R> L w/ limited ROM and daily pain worse at times, doing well now \par Xray Hips:  moderate OA, etiology\par NOTE: \par Tx  acupuncture and PT w/ limited benefit \par \par \par Plan:\par 1.  Continue HCQ... 200 mg routinely... \par \par 2.  Continue  MMF (mycophenolate= Cellcept)... at fs 1500 mg BID \par - recommended stopping 6 weeks prior to conception only.. then assess for disease stability. \par \par 3. Labs monthly .. needed \par \par 2.  Continue f/u  (Dr Jeffrey Pate [phone]).. as needed, at least annually. .. \par \par 3.  Continue  felodipine 10 mg, call immediately if any ulcerations present  \par \par 5.  ASA 81mg ... may need to reconsider.. if you continue to have ulcerations.. should restart not necessary at this point \par \par 6.  continue to work with derm\par \par 7.Is aware to call if there is any change in her underlying symptoms.\par \par 8.  Continue  Evusheld for you:  monoclonal antibody.. to prevent COVID infection, next dose 10/22\par \par 9.  Should get booster vaccine as well x 4, may consider in summer with modified version. \par \par 10.  Return 3 months \par \par \par \par \par \par

## 2022-07-05 NOTE — REVIEW OF SYSTEMS
[Heartburn] : heartburn [As Noted in HPI] : as noted in HPI [Anxiety] : anxiety [Negative] : Heme/Lymph [FreeTextEntry2] : stable wt  [de-identified] : laser tx for telangiectasias and digital pitting  [de-identified] : mild - appropriate

## 2022-07-07 ENCOUNTER — APPOINTMENT (OUTPATIENT)
Dept: DERMATOLOGY | Facility: CLINIC | Age: 32
End: 2022-07-07

## 2022-07-07 PROCEDURE — 17106 DSTR CUT VSC PRLF LES<10SQCM: CPT | Mod: 79

## 2022-07-14 ENCOUNTER — APPOINTMENT (OUTPATIENT)
Dept: CARDIOLOGY | Facility: CLINIC | Age: 32
End: 2022-07-14

## 2022-07-14 ENCOUNTER — NON-APPOINTMENT (OUTPATIENT)
Age: 32
End: 2022-07-14

## 2022-07-14 VITALS
RESPIRATION RATE: 14 BRPM | HEART RATE: 93 BPM | OXYGEN SATURATION: 97 % | WEIGHT: 148 LBS | TEMPERATURE: 98.1 F | SYSTOLIC BLOOD PRESSURE: 122 MMHG | HEIGHT: 62 IN | DIASTOLIC BLOOD PRESSURE: 78 MMHG | BODY MASS INDEX: 27.23 KG/M2

## 2022-07-14 DIAGNOSIS — Z13.6 ENCOUNTER FOR SCREENING FOR CARDIOVASCULAR DISORDERS: ICD-10-CM

## 2022-07-14 PROCEDURE — 93000 ELECTROCARDIOGRAM COMPLETE: CPT

## 2022-07-14 PROCEDURE — 93306 TTE W/DOPPLER COMPLETE: CPT

## 2022-07-14 PROCEDURE — 99214 OFFICE O/P EST MOD 30 MIN: CPT | Mod: 25

## 2022-07-14 NOTE — PHYSICAL EXAM
[Normal S1, S2] : normal S1, S2 [No Rub] : no rub [No Edema] : no edema [Normal] : alert and oriented, normal memory [de-identified] : 3 out of 6 systolic murmur left sternal border

## 2022-07-14 NOTE — ASSESSMENT
[FreeTextEntry1] : PT with ILD possibly from scleroderma vs. aspiration from GERD\par BP is to goal\par No evidence of PH on echo or exam\par Able to exercise\par No EKG changes\par Advised to fu in 1 year. \par

## 2022-07-14 NOTE — HISTORY OF PRESENT ILLNESS
[FreeTextEntry1] : 32-year-old female seen today due to mixed connective tissue disease with scleroderma with esophageal dysmotility and Raynaud's.  She is also having telangiectasias and sclerodactyly.  She denies having any dysphagia.\par She has no new symptoms no chest pain or shortness of breath.  She will get repeat CAT scan for further evaluation of ILD.  She does not exercise.\par \par EKG: NSR, 72 BPM, normal axis and intervals, no st/t changes. \par \par Echocardiogram performed today was dw pt. Right sided chambers are WNL, no evidence of PH. \par She exercising and has no new cardiac symptoms. \par Recently started on mycophenolate. \par \par CT chest from 3/22 was reviewed. Evidence of NSIP.

## 2022-07-19 ENCOUNTER — RX RENEWAL (OUTPATIENT)
Age: 32
End: 2022-07-19

## 2022-07-27 ENCOUNTER — TRANSCRIPTION ENCOUNTER (OUTPATIENT)
Age: 32
End: 2022-07-27

## 2022-08-11 ENCOUNTER — APPOINTMENT (OUTPATIENT)
Dept: DERMATOLOGY | Facility: CLINIC | Age: 32
End: 2022-08-11

## 2022-08-11 PROCEDURE — 17106 DSTR CUT VSC PRLF LES<10SQCM: CPT | Mod: 58

## 2022-08-12 ENCOUNTER — RX CHANGE (OUTPATIENT)
Age: 32
End: 2022-08-12

## 2022-08-15 ENCOUNTER — TRANSCRIPTION ENCOUNTER (OUTPATIENT)
Age: 32
End: 2022-08-15

## 2022-09-02 NOTE — H&P ADULT - NSCORESITESY/N_GEN_A_CORE_RD
Present Diagnosis and Illness: [de-identified] y.o. female who presents with liver lesion. 1. 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)        Allergies   Allergen Reactions    Other      Mold, trees       Current Outpatient Medications   Medication Sig Dispense Refill    atorvastatin (LIPITOR) 10 MG tablet Take 10 mg by mouth daily      vitamin E 400 UNIT capsule Take 400 Units by mouth 2 times daily      calcium-vitamin D (OSCAL-500) 500-200 MG-UNIT per tablet Take 1 tablet by mouth daily      Multiple Vitamins-Minerals (THERAPEUTIC MULTIVITAMIN-MINERALS) tablet Take 1 tablet by mouth daily      ascorbic acid (VITAMIN C) 500 MG tablet Take 500 mg by mouth daily       No current facility-administered medications for this encounter. Facility-Administered Medications Ordered in Other Encounters   Medication Dose Route Frequency Provider Last Rate Last Admin    furosemide (LASIX) tablet 40 mg  40 mg Oral Once Alexandro Valencia, APRN - CNP            Past Medical History:   Diagnosis Date    Arthritis     Cancer ([address]     melanoma    Diffuse large cell non-Hodgkin's lymphoma ([address]     Dislocation of hip ([address]     RIGHT X7    Environmental allergies     Foot drop, right 2010    with neuropathy from damaged nerve during hip surgery 2010    History of blood transfusion 01/2017    2 UNITS    Hyperlipidemia     Hypertension     Melanoma ([address]     right face    RAY (nonalcoholic steatohepatitis)     Venous insufficiency     RLE       Family History   Problem Relation Age of Onset    Cancer Mother 62        leukemia    Tuberculosis Father     Cancer Brother 72        lung    Cancer Brother 36        lung    Cancer Sister 36        Breast    Cancer Brother 62        prostate    Cancer Brother         lung    Cancer Brother         Prostate Cancet       Physical Examination:    Blood pressure (!) 124/56, pulse 61, temperature 98.2 °F (36.8 °C), temperature source Oral, resp.  rate 12, SpO2 98 Yes %.    Head and neck: normal atraumatic, no neck masses, normal thyroid, no jvd  Chest: Normal  Heart: Regular rate and rhythm  Abdominal: soft, non-tender. Bowel sounds normal. No masses,  no organomegaly  Neurological: normal    Moderate Sedation Focused Evaluation:    NPO for 4 hours: Yes    ASA 2 - Patient with mild systemic disease with no functional limitations    II (soft palate, uvula, fauces visible)    Activity:  2 - Able to move 4 extremities voluntarily on command  Respiration:  2 - Able to breathe deeply and cough freely  Circulation:  2 - BP+/- 20mmHg of normal  Consciousness:  2 - Fully awake  Oxygen Saturation (color):  2 - Able to maintain oxygen saturation >92% on room air    Sedation : Moderate sedation planned    Labs:  CBCP:  Lab Results   Component Value Date    WBC 4.8 07/28/2017    HGB 11.1 (L) 07/28/2017    HCT 37.2 (L) 07/28/2017    .5 (H) 07/28/2017    PLT 69 (L) 09/02/2022      BASIC:  Lab Results   Component Value Date/Time     07/28/2017 02:07 PM    K 4.1 07/28/2017 02:07 PM     07/28/2017 02:07 PM    CO2 26 07/28/2017 02:07 PM    BUN 16 07/28/2017 02:07 PM    CREATININE 0.77 07/28/2017 02:07 PM    GLUCOSE 89 07/28/2017 02:07 PM    CALCIUM 9.2 07/28/2017 02:07 PM       COAGS:  Lab Results   Component Value Date    INR 1.15 (H) 09/02/2022    INR 1.10 04/25/2017    INR 1.05 01/12/2017    PROTIME 14.6 (H) 09/02/2022    PROTIME 12.4 04/25/2017    PROTIME 12.0 01/12/2017        Assessment: [de-identified] y.o. female with liver lesion. Following evaluation, patient is appropriate for Moderate Sedation Yes. Plan: Will plan for liver lesion biopsy with moderate sedation.     Riya Rajan MD  09/02/22  10:51 AM

## 2022-09-07 ENCOUNTER — RX RENEWAL (OUTPATIENT)
Age: 32
End: 2022-09-07

## 2022-09-15 ENCOUNTER — RX RENEWAL (OUTPATIENT)
Age: 32
End: 2022-09-15

## 2022-09-16 ENCOUNTER — APPOINTMENT (OUTPATIENT)
Dept: PULMONOLOGY | Facility: CLINIC | Age: 32
End: 2022-09-16

## 2022-09-16 VITALS
BODY MASS INDEX: 25.76 KG/M2 | HEIGHT: 62 IN | WEIGHT: 140 LBS | SYSTOLIC BLOOD PRESSURE: 132 MMHG | DIASTOLIC BLOOD PRESSURE: 80 MMHG | RESPIRATION RATE: 16 BRPM

## 2022-09-16 VITALS — OXYGEN SATURATION: 97 % | HEART RATE: 84 BPM

## 2022-09-16 PROCEDURE — 99215 OFFICE O/P EST HI 40 MIN: CPT

## 2022-09-16 NOTE — HISTORY OF PRESENT ILLNESS
[TextBox_4] :  MCTD/Limited Scleroderma: consisting of Raynaud's, GERD (presumed esophageal dysmotility), telangiectases, sclerodactyly, RNP Ab. \par sig GERD\par no fever, chill, chest pain\par never smoker\par no GERD\par no sputum, \par no fever, chill, chest pain\par had some sputum, now resolved\par fully vaccinated x3 , Moderna, had Evusheld

## 2022-09-16 NOTE — CONSULT LETTER
[Dear  ___] : Dear  [unfilled], [Consult Letter:] : I had the pleasure of evaluating your patient, [unfilled]. [Please see my note below.] : Please see my note below. [Sincerely,] : Sincerely, [FreeTextEntry3] : Cj Mei DO [facility]\par Pulmonary Critical Care\par Director Pulmonary Division\par Medical Director Respiratory Therapy\par [facility]\par \par

## 2022-09-16 NOTE — DISCUSSION/SUMMARY
[FreeTextEntry1] : Mixed CT/Scleroderma, slowly progressive ILD, dilated esophagus\par At baseline, no pulmonary complaints, takes nocturnal precautions, now has adjustable bed\par last echocardiogram without  any pulmonary hypertension - 7/22\par Centrilobular GG nodules predominantly lung bases, progressed from 2020 - stable 3/22-3/21\par also reticular scarring upper lobes\par Suspect combination of  esophageal reflux and Scleroderma related, no Environmental exposures\par Currently essentially asymptomatic, exercises regularly, hx Avascular necrosis\par Now on MMF 3 gms, has sulfa rash, will discuss prophylaxis with rhe\par taking aspiration precautions, asymptomatic, using Proton pump ( day) and H2 night\par Will repeat CT scan and  PFts\par New GI appt for reflux\par Had Carlito in April, planning again in October\par 3 months or sooner if needed, advised pt to call for results\par \par

## 2022-09-19 ENCOUNTER — RX RENEWAL (OUTPATIENT)
Age: 32
End: 2022-09-19

## 2022-09-24 ENCOUNTER — NON-APPOINTMENT (OUTPATIENT)
Age: 32
End: 2022-09-24

## 2022-09-27 ENCOUNTER — APPOINTMENT (OUTPATIENT)
Dept: CT IMAGING | Facility: CLINIC | Age: 32
End: 2022-09-27

## 2022-09-27 PROCEDURE — 71250 CT THORAX DX C-: CPT

## 2022-09-28 ENCOUNTER — TRANSCRIPTION ENCOUNTER (OUTPATIENT)
Age: 32
End: 2022-09-28

## 2022-09-29 ENCOUNTER — APPOINTMENT (OUTPATIENT)
Dept: DERMATOLOGY | Facility: CLINIC | Age: 32
End: 2022-09-29

## 2022-09-29 PROCEDURE — 17106 DSTR CUT VSC PRLF LES<10SQCM: CPT | Mod: 79

## 2022-10-02 ENCOUNTER — NON-APPOINTMENT (OUTPATIENT)
Age: 32
End: 2022-10-02

## 2022-10-05 ENCOUNTER — LABORATORY RESULT (OUTPATIENT)
Age: 32
End: 2022-10-05

## 2022-10-07 ENCOUNTER — APPOINTMENT (OUTPATIENT)
Dept: RHEUMATOLOGY | Facility: CLINIC | Age: 32
End: 2022-10-07

## 2022-10-07 VITALS
HEART RATE: 78 BPM | SYSTOLIC BLOOD PRESSURE: 116 MMHG | DIASTOLIC BLOOD PRESSURE: 72 MMHG | OXYGEN SATURATION: 98 % | BODY MASS INDEX: 26.7 KG/M2 | WEIGHT: 146 LBS | TEMPERATURE: 97.6 F

## 2022-10-07 PROCEDURE — 99214 OFFICE O/P EST MOD 30 MIN: CPT | Mod: 25

## 2022-10-07 PROCEDURE — G0008: CPT

## 2022-10-07 PROCEDURE — 90662 IIV NO PRSV INCREASED AG IM: CPT

## 2022-10-07 NOTE — ASSESSMENT
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 AVN, advanced raynauds, telangiectasias \par Recent prolonged cough several wks, resolved- ? etiology 8/22 \par \par 1)  MCTD/Limited Scleroderma:  SHERI 1:2560S, + RNP > 8.0, SM 3.8, + RF 45 (neg CCP) w/ CK > 200 (intermittently) but no  overt weakness now or in past.   All other serologies to include APS neg w/ nl C3/4, dsDNA, SSA/B \par Primary sx:  raynauds, sclerodactyly, telangiectasias, mild sicca/ keratoconjunctivitis, NEW onset calcinosis trivedi surface both hands and heels of both feet..  12/21.. in addition to low grade fevers, arthralgias / myalgias and fatigue when "flaring". Added MMF in past 6 m 3 gm daily (4/22 well tolerated )  and continues w/ HCQ (200 mg= 3.07 mg/kg) with routine opth exam fine 3/22\par Currently reports feeling well controlled and functionally active, exercising daily with no limitations- cardio/ str training > 60 mn daily ,\par Dx w/ limited SSc by clinical exam but serology for REGGIE neg repeatedly- MCTD\par - Raynaud's moderate to severe at times, tried and failed to tolerate nifedipine/ amlodipine now on felodipine at 10 mg and doing well. Noted digital pitting but no report of active ulcerations -.. nothing for past few ys.. new one now.   Sclerodactyly slightly better today, ?? I think..overall not worse definitely  \par - GERD (presumed esophageal dysmotility) severe at times w/ dx H Pylori 12/20 completed triple tx, not well tolerated, now sx better on daily PPI fs omeprazole once daily altternating w/ H2 but recently doesn't feel H2 working will try sulcrafate and needs to return to GI for updated EGD.. scheduled to see Dr. Chapin next mnth. Denies dysphagia now or in past but concerned about possible aspiration and  has been c/w  sleeping HOB elevated.. and continued . Last EGD 12/20 (mild gastritis but otherwise neg- evidence of dysmotility??),\par - Telangiectases/ Calcinosis:  moderate to severe over face, chest s/p laser tx in past and again working with Dr. Rutherford now.... pleased with outcome.  Calcinosis noted 12/21- stable not painful.. if progresses b/l hands and now noted on heels of both feet.. working with podiatry q 6 m reduced through scraping.. will refer to OT for hand tx as needed (not necessary at this time.. though would be helpful given sclerodactyly too)\par - Sclerodactyly:  moderate in limited pattern, distal to PIPs and around mouth (sl progression)..stable x many ys and hands look sl better . \par - Cardiopulmonary:  Routine PFTs w/ DLCO 79-> 74-> 68 (2018)-> 70 (2019)-> 58 (3/21) w/ volumes relatively well preserved over time... but no reported hx of pleuropericardial effusion and completely denies SOB/JENSEN  cough or change in activity tolerance (exercises routinely) now but did have acute cough x several weeks over summer.. despite that updated CTScan 9/22 actually improved with mild- moderate resolution of GGO.. .  Resting tachycardia but w/out SOB/ CP.  Baseline Chest CTScan 3/21 + peripheral nodular GGO in LL w/ no lymphadenopathy and echo 6/21 essentially nl w/ NO evidence of PAH.  \par Updated Ctscan 3/22 unchanged.. 9/22 improved \par Updated PFTs needed now on MMF 3 g daily, ideally note improvement  needs update 12/22 \par - SerositiS:  elevated LFTs to 900 in past x 1 but repeat studies nl, etiology unclear. No renal dysfunction, no pleural/pericardial effusions.   \par Proteinuria:  intermittently + 200-400mg currently controlled  \par - Myopathy:  mildly elevated CK max 259 7/20...  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  Hypothyroidism on replacement w/ nl TSH \par \par 3) Family planning:  Oral contraceptive use: changed brands to a low estrogen containing pill- currently off, not in relationship, denies sexual activity.  Discussed current reproductive guidelines .... and is interested in pregnancy in next few ys.  Discussed use of MMF and need to stop 6-8 wks before conception... understood clearly, will contact \par  \par 4)  OA hips: primary vs. secondary. ? AVN (need updated eval). bilateral R> L w/ limited ROM and daily pain worse at times, doing well now \par Xray Hips:  moderate OA, etiology\par NOTE: \par Tx  acupuncture and PT w/ limited benefit \par \par \par Plan:\par - Administered Fluzone HD in Right deltoid \par \par - Needs covid booster \par \par - Get shingrix vaccination when she feels more stable health wise\par \par - Needs repeat endosocpy\par \par - Ordering ordering Carafate to be taken as needed (no more than 4 times a day) with omeprazole and famotidine \par \par - Continue using adjustable mattress \par \par - Follow up 3-4 months\par \par \par - Continue HCQ... 200 mg routinely... \par \par - Continue  MMF (mycophenolate= Cellcept)... at fs 1500 mg BID \par - recommended stopping 6 weeks prior to conception only.. then assess for disease stability. \par \par - Continue f/u  (Dr Jeffrey Pate [phone]).. as needed, at least annually. .. \par \par - Continue  felodipine 10 mg, call immediately if any ulcerations present  \par \par - ASA 81mg ... may need to reconsider.. if you continue to have ulcerations.. should restart not necessary at this point \par \par - continue to work with derm\par \par - Is aware to call if there is any change in her underlying symptoms.\par \par - Continue  Evusheld for you:  monoclonal antibody.. to prevent COVID infection, next dose 10/22. \par \par - Return 3 months \par \par \par \par \par \par

## 2022-10-07 NOTE — HISTORY OF PRESENT ILLNESS
[FreeTextEntry1] : 10/7/2022\par - Reports Pulmonary CT scan was much better that before....scheduled for breathing test in December.\par -  Everything else is overall okay....had a random lingering cough for some time that has finally been resolved....cough did not worsen over time was just persistent.....negative for COVID, but did not check flu,\par - Raynaud on fingers have been controlled despite weather changes  \par - Reports slight feeling of arthritis in R big toe 1st MTP ...follows up with podiatrist for calcinosis every 6 months scraping to reduce size, well tolerated ..denies calcinosis anywhere else besides feet.....had this condition for the past 2 years.\par - Reports that famotidine is not effective in treating her aspiration... was alternating between famotidine and omeprazole......currently thinking of just taking omeprazole\par - Has not gotten shingrix due to previous illness\par - Has evusheld scheduled for November 2022\par \par \par 1.  MCTD/Limited Scleroderma:  consisting of Raynaud's, GERD (presumed esophageal dysmotility), telangiectases, sclerodactyly, RNP Ab.  \par 2.  GERD. Pt on ranitidine. GERD symptoms on and off. No dysphagia. \par 3.  Hypothyroidism\par 4.  Myopathy: mild elevation of CKs on a couple of occasions\par 5.  Plaquenil use:  no ocular symptoms. \par 6.  Oral contraceptive use: changed brands to a low estrogen containing pill.\par 7.  Raynauds:  more problematic in the hands than feet. with pitting  . \par 9.  Avascular necrosis of hips:   bilateral.  moderate to severe R > L w/ advanced DJD   An x-ray was read as moderate OA of the hip (acetabular sclerosis and osteophytes).  \par 10. PNa 2017  Antibiotics were administered, and she improved.\par 12.  Resting tachycardia: no dyspnea or chest pain\par

## 2022-10-07 NOTE — DATA REVIEWED
[FreeTextEntry1] : Labs: \par \par 9/29/2022 nl CBC, CMP, ESR, C3, C4, CRP\par \par 3/21 ESr 22 Myomarker 3 +   nl cmp, CBC ck, CRP, C3/4, w/ neg dsDNA, LAC/ APS, PCR\par \par \par               9/18/18 8/14/19 2021 2022\par PFTs  \par FVC         88->             90->        85->    88\par TLC          98->             84->        80-)    86\par DLCO       68->             70->        58-)    68\par \par CTScan 3/22 stable GGO still present, NSIP pattern c/w CTD, no lymphadenopathy\par \par CTScan chest 3/22/21 peripheral nodular ggo LLs w/ no lymphadenopathy\par \par Echo:  10/16/20 nl w/ no mention PAH.. but RA/RV nl in size\par \par Xray b/l hips 3/21 symmetric djd spurring b/l  along articular margins without  gross JS loss without obvious evidence of AVN

## 2022-10-07 NOTE — PROCEDURE
[Today's Date:] : Date: [unfilled] [Risks] : risks [Benefits] : benefits [Alternatives] : alternatives [Consent Obtained] : written consent was obtained prior to the procedure and is detailed in the patient's record [Patient] : Prior to the start of the procedure a time out was taken and the identity of the patient was confirmed via name and date of birth with the patient. The correct site and the procedure to be performed were confirmed. The correct side was confirmed if applicable. The availability of the correct equipment was verified [Diagnostic] : diagnostic  [#1 Site: ______] : #1 site identified in the [unfilled] [Alcohol] : alcohol [de-identified] : Fluzone  ([id number]      [id number]) [FreeTextEntry1] : monitor for flulike sx or allergic rxn, take APAP as needed for the first 3 days, if flulike sx persist call office\par

## 2022-10-07 NOTE — REVIEW OF SYSTEMS
[Heartburn] : heartburn [As Noted in HPI] : as noted in HPI [Anxiety] : anxiety [Negative] : Integumentary [FreeTextEntry2] : stable wt  [de-identified] : laser tx for telangiectasias and digital pitting  [de-identified] : improved

## 2022-10-12 LAB
ALBUMIN SERPL ELPH-MCNC: 4.8 G/DL
ALP BLD-CCNC: 94 U/L
ALT SERPL-CCNC: 31 U/L
ANION GAP SERPL CALC-SCNC: 12 MMOL/L
APPEARANCE: CLEAR
AST SERPL-CCNC: 38 U/L
BASOPHILS # BLD AUTO: 0.03 K/UL
BASOPHILS NFR BLD AUTO: 0.5 %
BILIRUB SERPL-MCNC: 0.3 MG/DL
BILIRUBIN URINE: NEGATIVE
BLOOD URINE: NEGATIVE
BUN SERPL-MCNC: 11 MG/DL
C3 SERPL-MCNC: 128 MG/DL
C4 SERPL-MCNC: 23 MG/DL
CALCIUM SERPL-MCNC: 9.7 MG/DL
CHLORIDE SERPL-SCNC: 102 MMOL/L
CK SERPL-CCNC: 167 U/L
CO2 SERPL-SCNC: 25 MMOL/L
COLOR: COLORLESS
CREAT SERPL-MCNC: 0.53 MG/DL
CREAT SPEC-SCNC: 7 MG/DL
CREAT/PROT UR: NORMAL RATIO
CRP SERPL-MCNC: <3 MG/L
DSDNA AB SER-ACNC: <12 IU/ML
EGFR: 127 ML/MIN/1.73M2
EOSINOPHIL # BLD AUTO: 0.13 K/UL
EOSINOPHIL NFR BLD AUTO: 2.3 %
ERYTHROCYTE [SEDIMENTATION RATE] IN BLOOD BY WESTERGREN METHOD: 19 MM/HR
GLUCOSE QUALITATIVE U: NEGATIVE
GLUCOSE SERPL-MCNC: 118 MG/DL
HCT VFR BLD CALC: 38.8 %
HGB BLD-MCNC: 12.5 G/DL
IMM GRANULOCYTES NFR BLD AUTO: 0.2 %
KETONES URINE: NEGATIVE
LEUKOCYTE ESTERASE URINE: NEGATIVE
LYMPHOCYTES # BLD AUTO: 1.61 K/UL
LYMPHOCYTES NFR BLD AUTO: 28.9 %
MAN DIFF?: NORMAL
MCHC RBC-ENTMCNC: 26.9 PG
MCHC RBC-ENTMCNC: 32.2 GM/DL
MCV RBC AUTO: 83.6 FL
MONOCYTES # BLD AUTO: 0.44 K/UL
MONOCYTES NFR BLD AUTO: 7.9 %
NEUTROPHILS # BLD AUTO: 3.35 K/UL
NEUTROPHILS NFR BLD AUTO: 60.2 %
NITRITE URINE: NEGATIVE
PH URINE: 7.5
PLATELET # BLD AUTO: 353 K/UL
POTASSIUM SERPL-SCNC: 4.2 MMOL/L
PROT SERPL-MCNC: 7.6 G/DL
PROT UR-MCNC: <4 MG/DL
PROTEIN URINE: NEGATIVE
RBC # BLD: 4.64 M/UL
RBC # FLD: 13.5 %
SODIUM SERPL-SCNC: 139 MMOL/L
SPECIFIC GRAVITY URINE: 1
UROBILINOGEN URINE: NORMAL
WBC # FLD AUTO: 5.57 K/UL

## 2022-10-22 DIAGNOSIS — J01.90 ACUTE SINUSITIS, UNSPECIFIED: ICD-10-CM

## 2022-10-27 ENCOUNTER — TRANSCRIPTION ENCOUNTER (OUTPATIENT)
Age: 32
End: 2022-10-27

## 2022-11-08 ENCOUNTER — RX RENEWAL (OUTPATIENT)
Age: 32
End: 2022-11-08

## 2022-11-08 ENCOUNTER — APPOINTMENT (OUTPATIENT)
Dept: DERMATOLOGY | Facility: CLINIC | Age: 32
End: 2022-11-08

## 2022-11-08 PROCEDURE — 17106 DSTR CUT VSC PRLF LES<10SQCM: CPT | Mod: 58

## 2022-11-12 ENCOUNTER — APPOINTMENT (OUTPATIENT)
Age: 32
End: 2022-11-12

## 2022-11-12 ENCOUNTER — OUTPATIENT (OUTPATIENT)
Dept: OUTPATIENT SERVICES | Facility: HOSPITAL | Age: 32
LOS: 1 days | End: 2022-11-12

## 2022-11-12 VITALS
SYSTOLIC BLOOD PRESSURE: 120 MMHG | HEART RATE: 75 BPM | RESPIRATION RATE: 18 BRPM | TEMPERATURE: 98 F | DIASTOLIC BLOOD PRESSURE: 82 MMHG | OXYGEN SATURATION: 97 %

## 2022-11-12 VITALS
DIASTOLIC BLOOD PRESSURE: 79 MMHG | SYSTOLIC BLOOD PRESSURE: 124 MMHG | RESPIRATION RATE: 18 BRPM | HEART RATE: 93 BPM | TEMPERATURE: 99 F | OXYGEN SATURATION: 97 %

## 2022-11-12 DIAGNOSIS — M35.1 OTHER OVERLAP SYNDROMES: ICD-10-CM

## 2022-11-12 DIAGNOSIS — J84.9 INTERSTITIAL PULMONARY DISEASE, UNSPECIFIED: ICD-10-CM

## 2022-11-12 DIAGNOSIS — Z23 ENCOUNTER FOR IMMUNIZATION: ICD-10-CM

## 2022-11-12 DIAGNOSIS — G72.9 MYOPATHY, UNSPECIFIED: ICD-10-CM

## 2022-11-17 ENCOUNTER — TRANSCRIPTION ENCOUNTER (OUTPATIENT)
Age: 32
End: 2022-11-17

## 2022-11-30 ENCOUNTER — APPOINTMENT (OUTPATIENT)
Dept: GASTROENTEROLOGY | Facility: CLINIC | Age: 32
End: 2022-11-30

## 2022-11-30 VITALS
BODY MASS INDEX: 26.87 KG/M2 | WEIGHT: 146 LBS | SYSTOLIC BLOOD PRESSURE: 132 MMHG | TEMPERATURE: 97.6 F | HEART RATE: 71 BPM | HEIGHT: 62 IN | OXYGEN SATURATION: 98 % | DIASTOLIC BLOOD PRESSURE: 84 MMHG

## 2022-11-30 PROCEDURE — 99203 OFFICE O/P NEW LOW 30 MIN: CPT

## 2022-11-30 NOTE — PHYSICAL EXAM
[Alert] : alert [Normal Voice/Communication] : normal voice/communication [Healthy Appearing] : healthy appearing [No Acute Distress] : no acute distress [Sclera] : the sclera and conjunctiva were normal [Hearing Threshold Finger Rub Not [place]] : hearing was normal [Normal Lips/Gums] : the lips and gums were normal [Oropharynx] : the oropharynx was normal [Normal Appearance] : the appearance of the neck was normal [No Neck Mass] : no neck mass was observed [No Respiratory Distress] : no respiratory distress [No Acc Muscle Use] : no accessory muscle use [Respiration, Rhythm And Depth] : normal respiratory rhythm and effort [Auscultation Breath Sounds / Voice Sounds] : lungs were clear to auscultation bilaterally [Heart Rate And Rhythm] : heart rate was normal and rhythm regular [Normal S1, S2] : normal S1 and S2 [Murmurs] : no murmurs [Bowel Sounds] : normal bowel sounds [Abdomen Tenderness] : non-tender [No Masses] : no abdominal mass palpated [Abdomen Soft] : soft [] : no hepatosplenomegaly [Oriented To Time, Place, And Person] : oriented to person, place, and time

## 2022-11-30 NOTE — HISTORY OF PRESENT ILLNESS
[FreeTextEntry1] : This is a pleasant 32-year-old female with history of mixed connective tissue disease, interstitial lung disease, presenting for management of chronic GERD.  She reports GERD symptoms of over 5 years duration for which she would feel food regurgitation and burning sensation in her chest.  She denies dysphagia or odynophagia.  She denies abdominal pain, nausea or vomiting.  She relates that she has been on omeprazole 40 mg daily for over 5 years.  When she tries to stop the omeprazole and even switching over to famotidine 40 mg daily she would develop heartburn symptoms.  She underwent an upper endoscopy December 2020 where she was found to have H. pylori gastritis.  She was treated with quadruple therapy and had follow-up breath testing which came back negative for H. pylori.  Esophageal biopsies at the GE junction consistent with squamous epithelium mucosa with no diagnostic abnormality, negative for intestinal metaplasia.  Esophagus biopsy 25 cm showing squamous esophageal mucosa with no diagnostic abnormality negative for intestinal metaplasia.  She denies changes in bowel habits.  She denies rectal bleeding or melena.  She denies weight loss or weight gain.  She denies family history of colon cancer or colon polyps.

## 2022-11-30 NOTE — ASSESSMENT
[FreeTextEntry1] : This is a 32-year-old female with chronic GERD dependent on omeprazole 40 mg daily.  I informed her possible complications of long-term PPI therapy including but not limited to bone loss.  I recommend trial of low-dose omeprazole 20 mg in the morning half hour before meal and famotidine 40 mg at bedtime.  She is aware that she needs to separate her omeprazole dosing from her levothyroxine.  If she is feeling well without symptoms of reflux on this regimen, she can extend frequency of omeprazole to every other day or every 3 days and continue on famotidine 40 mg at bedtime.  I recommend she eats 5 small meals a day and avoid laying down after eating.  She is to call if she has any questions or concerns otherwise I will see her for follow-up visit in 6 months.  She is to call me if she develops symptoms of dysphagia at which time she will undergo an esophageal motility testing.

## 2022-12-23 ENCOUNTER — NON-APPOINTMENT (OUTPATIENT)
Age: 32
End: 2022-12-23

## 2022-12-27 ENCOUNTER — APPOINTMENT (OUTPATIENT)
Dept: DERMATOLOGY | Facility: CLINIC | Age: 32
End: 2022-12-27
Payer: COMMERCIAL

## 2022-12-27 DIAGNOSIS — B07.9 VIRAL WART, UNSPECIFIED: ICD-10-CM

## 2022-12-27 LAB — SARS-COV-2 N GENE NPH QL NAA+PROBE: NOT DETECTED

## 2022-12-27 PROCEDURE — 17106 DSTR CUT VSC PRLF LES<10SQCM: CPT | Mod: 79

## 2022-12-29 ENCOUNTER — APPOINTMENT (OUTPATIENT)
Dept: PULMONOLOGY | Facility: CLINIC | Age: 32
End: 2022-12-29
Payer: COMMERCIAL

## 2022-12-29 VITALS — WEIGHT: 148 LBS | HEIGHT: 61.5 IN | BODY MASS INDEX: 27.58 KG/M2

## 2022-12-29 PROCEDURE — 94727 GAS DIL/WSHOT DETER LNG VOL: CPT

## 2022-12-29 PROCEDURE — 85018 HEMOGLOBIN: CPT | Mod: QW

## 2022-12-29 PROCEDURE — 94729 DIFFUSING CAPACITY: CPT

## 2022-12-29 PROCEDURE — 94010 BREATHING CAPACITY TEST: CPT

## 2023-01-02 LAB
ALBUMIN SERPL ELPH-MCNC: 4.6 G/DL
ALP BLD-CCNC: 107 U/L
ALT SERPL-CCNC: 24 U/L
ANION GAP SERPL CALC-SCNC: 12 MMOL/L
AST SERPL-CCNC: 31 U/L
BASOPHILS # BLD AUTO: 0.05 K/UL
BASOPHILS NFR BLD AUTO: 0.6 %
BILIRUB SERPL-MCNC: 0.2 MG/DL
BUN SERPL-MCNC: 13 MG/DL
C3 SERPL-MCNC: 135 MG/DL
CALCIUM SERPL-MCNC: 9.3 MG/DL
CHLORIDE SERPL-SCNC: 100 MMOL/L
CK SERPL-CCNC: 109 U/L
CO2 SERPL-SCNC: 26 MMOL/L
CREAT SERPL-MCNC: 0.63 MG/DL
CRP SERPL-MCNC: 4 MG/L
EGFR: 122 ML/MIN/1.73M2
EOSINOPHIL # BLD AUTO: 0.17 K/UL
EOSINOPHIL NFR BLD AUTO: 2 %
ERYTHROCYTE [SEDIMENTATION RATE] IN BLOOD BY WESTERGREN METHOD: 20 MM/HR
GLUCOSE SERPL-MCNC: 88 MG/DL
HCT VFR BLD CALC: 39.2 %
HGB BLD-MCNC: 11.9 G/DL
IMM GRANULOCYTES NFR BLD AUTO: 0.2 %
LYMPHOCYTES # BLD AUTO: 2.21 K/UL
LYMPHOCYTES NFR BLD AUTO: 26.5 %
MAN DIFF?: NORMAL
MCHC RBC-ENTMCNC: 26.6 PG
MCHC RBC-ENTMCNC: 30.4 GM/DL
MCV RBC AUTO: 87.5 FL
MONOCYTES # BLD AUTO: 0.83 K/UL
MONOCYTES NFR BLD AUTO: 10 %
NEUTROPHILS # BLD AUTO: 5.05 K/UL
NEUTROPHILS NFR BLD AUTO: 60.7 %
PLATELET # BLD AUTO: 438 K/UL
POTASSIUM SERPL-SCNC: 4.5 MMOL/L
PROT SERPL-MCNC: 7.7 G/DL
RBC # BLD: 4.48 M/UL
RBC # FLD: 13 %
SODIUM SERPL-SCNC: 139 MMOL/L
WBC # FLD AUTO: 8.33 K/UL

## 2023-01-03 ENCOUNTER — NON-APPOINTMENT (OUTPATIENT)
Age: 33
End: 2023-01-03

## 2023-01-06 ENCOUNTER — APPOINTMENT (OUTPATIENT)
Dept: RHEUMATOLOGY | Facility: CLINIC | Age: 33
End: 2023-01-06
Payer: COMMERCIAL

## 2023-01-06 VITALS
HEIGHT: 61 IN | HEART RATE: 101 BPM | WEIGHT: 145 LBS | SYSTOLIC BLOOD PRESSURE: 140 MMHG | BODY MASS INDEX: 27.38 KG/M2 | OXYGEN SATURATION: 100 % | DIASTOLIC BLOOD PRESSURE: 90 MMHG

## 2023-01-06 DIAGNOSIS — G47.00 INSOMNIA, UNSPECIFIED: ICD-10-CM

## 2023-01-06 PROCEDURE — 99214 OFFICE O/P EST MOD 30 MIN: CPT

## 2023-01-06 RX ORDER — OMEPRAZOLE 40 MG/1
40 CAPSULE, DELAYED RELEASE ORAL
Qty: 90 | Refills: 3 | Status: DISCONTINUED | COMMUNITY
Start: 2021-03-17 | End: 2023-01-06

## 2023-01-06 RX ORDER — AZITHROMYCIN 250 MG/1
250 TABLET, FILM COATED ORAL
Qty: 1 | Refills: 0 | Status: DISCONTINUED | COMMUNITY
Start: 2022-10-22 | End: 2023-01-06

## 2023-01-06 RX ORDER — SUCRALFATE 1 G/1
1 TABLET ORAL
Qty: 120 | Refills: 2 | Status: DISCONTINUED | COMMUNITY
Start: 2022-10-07 | End: 2023-01-06

## 2023-01-06 RX ORDER — MOMETASONE 50 UG/1
50 SPRAY, METERED NASAL DAILY
Qty: 1 | Refills: 3 | Status: ACTIVE | COMMUNITY
Start: 2022-10-22 | End: 1900-01-01

## 2023-01-06 NOTE — HISTORY OF PRESENT ILLNESS
[FreeTextEntry1] : 1/6/23\par Updated PFTs 12/29/22 stable overall.. no worsening.  \par \par -Continues MMF 2 gm /  mg daily, off steroids.  Updated PFTs/ CTScan 2022 all stable, actually improved slightly.  \par \par - this is the first week she has been feeling well since her last visit......since August 2022 she has been battling recurrent illness- URI- that last for 2-3 weeks at a time w/ a brief period of resolution between each episode.......she gets a lot of congestion that progresses into a cough or sore throat \par - she also gets swollen lymph nodes R cervical when she is sick...sometimes she gets them right before she starts to feel sick....denies infection ever effecting lungs \par - she did present to urgent care for two of these episodes who have confirmed that it was a viral infection. 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 close contact routinely.  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\par \par - Medication : celcept (4 pills total), plaquenil, vitamine C, mometasone (regularly in the winter), felodipine 10 mg (raynauds have been doing well despite it being winter)\par - Since last visit Gastro has recommended she take 20 mg of omeprazole in the morning and 40 mg of famotidine at night to help w/ reflux.....due for endoscopy (working w/ Dr Chapin)\par - last eye exam was September 2022 and everything was nl\par - hasn't gotten shingles vaccine because she hasn't been feeling well\par - Raynaud on fingers have been controlled despite weather changes  no new ulcers/ or sclerodactyly\par - Telangiectasias.. persist, some improvement w/ laser but incomplete.. stable overall \par Denies any active joint inflammation or pain, does not limit activity.. when not acutely ill is very active \par \par \par 1.  MCTD/Limited Scleroderma:  consisting of Raynaud's, GERD (presumed esophageal dysmotility), telangiectases, sclerodactyly, RNP Ab.  \par 2.  GERD. Pt on ranitidine. GERD symptoms on and off. No dysphagia. \par 3.  Hypothyroidism\par 4.  Myopathy: mild elevation of CKs on a couple of occasions\par 5.  Plaquenil use:  no ocular symptoms. \par 6.  Oral contraceptive use: changed brands to a low estrogen containing pill.\par 7.  Raynauds:  more problematic in the hands than feet. with pitting  . \par 9.  Avascular necrosis of hips:   bilateral.  moderate to severe R > L w/ advanced DJD   An x-ray was read as moderate OA of the hip (acetabular sclerosis and osteophytes).  \par 10. PNa 2017  Antibiotics were administered, and she improved.\par 12.  Resting tachycardia: no dyspnea or chest pain\par

## 2023-01-06 NOTE — DATA REVIEWED
[FreeTextEntry1] : Labs: \par 12/30/2022 nl CBC plt 438, ESR 20, CMP, CK, CRP, C3/4, UA\par \par 9/29/2022 nl CBC, CMP, ESR, C3, C4, CRP\par \par 3/21 ESr 22 Myomarker 3 +   nl cmp, CBC ck, CRP, C3/4, w/ neg dsDNA, LAC/ APS, PCR\par \par \par               9/18/18 8/14/19 2021 2022\par PFTs  \par FVC         88->             90->        85->    88\par TLC          98->             84->        80-)    86\par DLCO       68->             70->        58-)    68\par \par CTScan 3/22 stable GGO still present, NSIP pattern c/w CTD, no lymphadenopathy\par \par CTScan chest 3/22/21 peripheral nodular ggo LLs w/ no lymphadenopathy\par \par Echo:  10/16/20 nl w/ no mention PAH.. but RA/RV nl in size\par \par Xray b/l hips 3/21 symmetric djd spurring b/l  along articular margins without  gross JS loss without obvious evidence of AVN

## 2023-01-06 NOTE — ASSESSMENT
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 AVN, advanced raynauds, telangiectasias \par \par \par 1)  MCTD/Limited Scleroderma:  SHERI 1:2560S, + RNP > 8.0, SM 3.8, + RF 45 (neg CCP) w/ CK > 200 (intermittently) but no  overt weakness now or in past.   All other serologies to include APS neg w/ nl C3/4, dsDNA, SSA/B \par Primary sx:  raynauds, sclerodactyly, telangiectasias, mild sicca/ keratoconjunctivitis, NEW onset calcinosis trivedi surface both hands and heels of both feet..  12/21.. in addition to low grade fevers, arthralgias / myalgias and fatigue when "flaring". Added MMF in past 6 m 3 gm daily (4/22 well tolerated )  and continues w/ HCQ (200 mg= 3.07 mg/kg) with routine opth exam fine 3/22- > lowered to 2 gm MMF and remains stable.  \par When doesn't have URI feels well exercises without limitation but since last visit has had 3 URIs each lasting 2-3 wks and overwhelming fatigue/ poor sleep.  \par Concerned about immunosuppression- but also likely exposure to young children... will continue w/ nasal steroids but wonder if anything should be considered ?? montelukast.. really can't lower DMARds... is also trying to make changes at work...\par Summary Status by symptom:\par - Raynaud's moderate to severe at times, tried and failed to tolerate nifedipine/ amlodipine now on felodipine at 10 mg and doing well. Noted digital pitting but no report of active ulcerations -.. nothing for past few ys.. new one now.   Sclerodactyly slightly better today, ?? I think..overall not worse definitely  \par - GERD (presumed esophageal dysmotility) severe at times w/ dx H Pylori 12/20 completed triple tx, not well tolerated, now sx better on daily PPI 20 mg and H2 40 mg w/ prn sulcrafate.. working w/ Dr Chapin.. pending updated EGD (last study 12/20- mild gastritis but otherwise neg- evidence of dysmotility??),\par - Telangiectases/ Calcinosis:  moderate to severe over face, chest s/p laser tx in past and again working with Dr. Rutherford now.... pleased with outcome.  Calcinosis noted 12/21- stable not painful.. if progresses b/l hands and now noted on heels of both feet.. working with podiatry q 6 m reduced through scraping.. will refer to OT for hand tx as needed (not necessary at this time.. though would be helpful given sclerodactyly too)\par - Sclerodactyly:  moderate in limited pattern, distal to PIPs and around mouth (sl progression)..stable x many ys and hands look sl better . \par - Cardiopulmonary:  Routine PFTs w/ DLCO 79-> 74-> 68 (2018)-> 70 (2019)-> 58 (3/21) w/ volumes relatively well preserved over time... no reported hx of pleuropericardial effusion and completely denies SOB/JENSEN  cough or change in activity tolerance (exercises routinely).  updated CTScan 9/22 actually improved with mild- moderate resolution of GGO now on MMF.. \par Updated PFTs needed now on MMF 3 g daily, ideally note improvement  needs update 12/22 -> repeated on 2 gm and stable \par - SerositiS:  elevated LFTs to 900 in past x 1 but repeat studies nl, etiology unclear. No renal dysfunction, no pleural/pericardial effusions.   \par Proteinuria:  intermittently + 200-400mg currently controlled  \par - Myopathy:  mildly elevated CK max 259 7/20...  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  Hypothyroidism on replacement w/ nl TSH \par \par 3) Family planning:  Oral contraceptive use: changed brands to a low estrogen containing pill- currently off, not in relationship, denies sexual activity.  Discussed current reproductive guidelines .... and is interested in pregnancy in next few ys.  Discussed use of MMF and need to stop 6-8 wks before conception... understood clearly, will contact - unchanged today \par  \par 4)  OA hips: primary vs. secondary. ? AVN (need updated eval). bilateral R> L w/ limited ROM and daily pain worse at times, doing well now \par Xray Hips:  moderate OA, etiology\par NOTE: \par Tx  acupuncture and PT w/ limited benefit \par \par \par Plan:\par - increase vitamine D and C intake \par \par - starting on 2.5 mg of ambien can go up to 10 mg PRN for sleep disruption when sick \par \par - Keep using mometasone nasal spray through winter months \par \par - talk to Dr. Mei about starting Singulair as another option... \par \par - Get shingrix vaccination when she feels more stable health wise\par \par - Needs repeat endosocpy as per GI f/u with Dr. Chapin routinely continue to try to minimize PPI as directed \par \par - Continue Carafate to be taken as needed (no more than 4 times a day) with omeprazole and famotidine \par \par - Follow up 4 months\par \par - Continue HCQ... 200 mg routinely... \par \par - recommended stopping MMF 6 weeks prior to conception only.. then assess for disease stability. \par \par - Continue f/u  (Dr Jeffrey Pate [phone]).. as needed, at least annually. .. \par \par - Continue  felodipine 10 mg, call immediately if any ulcerations present  \par \par - ASA 81mg ... may need to reconsider.. if you continue to have ulcerations.. should restart not necessary at this point \par \par - continue to work with derm\par \par - Is aware to call if there is any change in her underlying symptoms.\par \par \par \par \par \par

## 2023-01-06 NOTE — REVIEW OF SYSTEMS
[Heartburn] : heartburn [Anxiety] : anxiety [Negative] : Heme/Lymph [As Noted in HPI] : as noted in HPI [Cough] : cough [FreeTextEntry2] : stable wt battling recurrent viral infections  [FreeTextEntry7] : on 20 mg of omeprazole in the morning and 40 mg of famotidine at night for reflux  [de-identified] : laser tx for telangiectasias and digital pitting  [de-identified] : improved

## 2023-01-20 ENCOUNTER — APPOINTMENT (OUTPATIENT)
Dept: INTERNAL MEDICINE | Facility: CLINIC | Age: 33
End: 2023-01-20
Payer: COMMERCIAL

## 2023-01-20 VITALS
SYSTOLIC BLOOD PRESSURE: 130 MMHG | HEIGHT: 61 IN | TEMPERATURE: 98 F | WEIGHT: 146 LBS | HEART RATE: 98 BPM | OXYGEN SATURATION: 98 % | DIASTOLIC BLOOD PRESSURE: 70 MMHG | BODY MASS INDEX: 27.56 KG/M2

## 2023-01-20 DIAGNOSIS — Z11.1 ENCOUNTER FOR SCREENING FOR RESPIRATORY TUBERCULOSIS: ICD-10-CM

## 2023-01-20 PROCEDURE — 99395 PREV VISIT EST AGE 18-39: CPT | Mod: 25

## 2023-01-20 NOTE — HISTORY OF PRESENT ILLNESS
[FreeTextEntry1] : 32 year old female patient here at the office today for a annual physical exam. [de-identified] : Pt presnets to the office today for CPE and FBW\par She has been feeling much better within the last month with her fatigue\par She is currently trying to make a job change for physical and mental health \par She has been stretching daily and walking.  She has not increased exercise as of yet \par Compliant with her medications\par Routinely sees her specialists

## 2023-01-20 NOTE — PLAN
[FreeTextEntry1] : Pt will continue with healthy diet\par Continue with walking and exercise as tolerated\par FBW done in office today\par TB testing done and will complete work form once BW is reviewed

## 2023-01-20 NOTE — PHYSICAL EXAM
[No Acute Distress] : no acute distress [Well Nourished] : well nourished [Well Developed] : well developed [Well-Appearing] : well-appearing [Normal Sclera/Conjunctiva] : normal sclera/conjunctiva [PERRL] : pupils equal round and reactive to light [EOMI] : extraocular movements intact [Normal Outer Ear/Nose] : the outer ears and nose were normal in appearance [Normal Oropharynx] : the oropharynx was normal [No JVD] : no jugular venous distention [No Lymphadenopathy] : no lymphadenopathy [Supple] : supple [Thyroid Normal, No Nodules] : the thyroid was normal and there were no nodules present [No Respiratory Distress] : no respiratory distress  [No Accessory Muscle Use] : no accessory muscle use [Clear to Auscultation] : lungs were clear to auscultation bilaterally [Normal Rate] : normal rate  [Regular Rhythm] : with a regular rhythm [Normal S1, S2] : normal S1 and S2 [No Murmur] : no murmur heard [No Carotid Bruits] : no carotid bruits [No Abdominal Bruit] : a ~M bruit was not heard ~T in the abdomen [No Varicosities] : no varicosities [Pedal Pulses Present] : the pedal pulses are present [No Edema] : there was no peripheral edema [No Palpable Aorta] : no palpable aorta [No Extremity Clubbing/Cyanosis] : no extremity clubbing/cyanosis [Soft] : abdomen soft [Non Tender] : non-tender [Non-distended] : non-distended [No Masses] : no abdominal mass palpated [No HSM] : no HSM [Normal Bowel Sounds] : normal bowel sounds [Normal Posterior Cervical Nodes] : no posterior cervical lymphadenopathy [Normal Anterior Cervical Nodes] : no anterior cervical lymphadenopathy [No CVA Tenderness] : no CVA  tenderness [No Spinal Tenderness] : no spinal tenderness [No Joint Swelling] : no joint swelling [Grossly Normal Strength/Tone] : grossly normal strength/tone [Coordination Grossly Intact] : coordination grossly intact [No Focal Deficits] : no focal deficits [Normal Gait] : normal gait [Deep Tendon Reflexes (DTR)] : deep tendon reflexes were 2+ and symmetric [Normal Affect] : the affect was normal [Normal Insight/Judgement] : insight and judgment were intact [de-identified] : telangiectases on face and palms Coloration of fingertips improved

## 2023-01-20 NOTE — REVIEW OF SYSTEMS
[Negative] : Heme/Lymph [FreeTextEntry2] : intermittent fatigue [FreeTextEntry7] : reflux has subsided

## 2023-01-22 ENCOUNTER — RX RENEWAL (OUTPATIENT)
Age: 33
End: 2023-01-22

## 2023-01-26 ENCOUNTER — TRANSCRIPTION ENCOUNTER (OUTPATIENT)
Age: 33
End: 2023-01-26

## 2023-01-26 LAB
ALBUMIN SERPL ELPH-MCNC: 5.2 G/DL
ALP BLD-CCNC: 113 U/L
ALT SERPL-CCNC: 29 U/L
ANION GAP SERPL CALC-SCNC: 13 MMOL/L
APPEARANCE: CLEAR
AST SERPL-CCNC: 32 U/L
BACTERIA: NEGATIVE
BASOPHILS # BLD AUTO: 0.04 K/UL
BASOPHILS NFR BLD AUTO: 0.7 %
BILIRUB SERPL-MCNC: 0.4 MG/DL
BILIRUBIN URINE: NEGATIVE
BLOOD URINE: NEGATIVE
BUN SERPL-MCNC: 10 MG/DL
CALCIUM SERPL-MCNC: 10.1 MG/DL
CHLORIDE SERPL-SCNC: 101 MMOL/L
CHOLEST SERPL-MCNC: 174 MG/DL
CO2 SERPL-SCNC: 24 MMOL/L
COLOR: NORMAL
CREAT SERPL-MCNC: 0.56 MG/DL
EGFR: 125 ML/MIN/1.73M2
EOSINOPHIL # BLD AUTO: 0.13 K/UL
EOSINOPHIL NFR BLD AUTO: 2.4 %
GLUCOSE QUALITATIVE U: NEGATIVE
GLUCOSE SERPL-MCNC: 86 MG/DL
HCT VFR BLD CALC: 43.2 %
HDLC SERPL-MCNC: 57 MG/DL
HGB BLD-MCNC: 13.4 G/DL
HYALINE CASTS: 0 /LPF
IMM GRANULOCYTES NFR BLD AUTO: 0.2 %
KETONES URINE: NEGATIVE
LDLC SERPL CALC-MCNC: 103 MG/DL
LEUKOCYTE ESTERASE URINE: NEGATIVE
LYMPHOCYTES # BLD AUTO: 2.11 K/UL
LYMPHOCYTES NFR BLD AUTO: 39.4 %
M TB IFN-G BLD-IMP: NEGATIVE
MAN DIFF?: NORMAL
MCHC RBC-ENTMCNC: 26.5 PG
MCHC RBC-ENTMCNC: 31 GM/DL
MCV RBC AUTO: 85.4 FL
MICROSCOPIC-UA: NORMAL
MONOCYTES # BLD AUTO: 0.31 K/UL
MONOCYTES NFR BLD AUTO: 5.8 %
NEUTROPHILS # BLD AUTO: 2.76 K/UL
NEUTROPHILS NFR BLD AUTO: 51.5 %
NITRITE URINE: NEGATIVE
NONHDLC SERPL-MCNC: 116 MG/DL
PH URINE: 7.5
PLATELET # BLD AUTO: 425 K/UL
POTASSIUM SERPL-SCNC: 4.5 MMOL/L
PROT SERPL-MCNC: 8.6 G/DL
PROTEIN URINE: NEGATIVE
QUANTIFERON TB PLUS MITOGEN MINUS NIL: 2.37 IU/ML
QUANTIFERON TB PLUS NIL: 0.02 IU/ML
QUANTIFERON TB PLUS TB1 MINUS NIL: 0 IU/ML
QUANTIFERON TB PLUS TB2 MINUS NIL: 0 IU/ML
RBC # BLD: 5.06 M/UL
RBC # FLD: 13.6 %
RED BLOOD CELLS URINE: 1 /HPF
SODIUM SERPL-SCNC: 138 MMOL/L
SPECIFIC GRAVITY URINE: 1.01
SQUAMOUS EPITHELIAL CELLS: 0 /HPF
T4 FREE SERPL-MCNC: 1.7 NG/DL
TRIGL SERPL-MCNC: 69 MG/DL
TSH SERPL-ACNC: 0.66 UIU/ML
UROBILINOGEN URINE: NORMAL
VIT B12 SERPL-MCNC: 1026 PG/ML
WBC # FLD AUTO: 5.36 K/UL
WHITE BLOOD CELLS URINE: 0 /HPF

## 2023-02-09 ENCOUNTER — APPOINTMENT (OUTPATIENT)
Dept: DERMATOLOGY | Facility: CLINIC | Age: 33
End: 2023-02-09
Payer: COMMERCIAL

## 2023-02-09 PROCEDURE — 17106 DSTR CUT VSC PRLF LES<10SQCM: CPT | Mod: 79

## 2023-02-21 ENCOUNTER — APPOINTMENT (OUTPATIENT)
Dept: PULMONOLOGY | Facility: CLINIC | Age: 33
End: 2023-02-21
Payer: COMMERCIAL

## 2023-02-21 VITALS
SYSTOLIC BLOOD PRESSURE: 136 MMHG | DIASTOLIC BLOOD PRESSURE: 84 MMHG | WEIGHT: 142 LBS | BODY MASS INDEX: 26.13 KG/M2 | RESPIRATION RATE: 16 BRPM | HEIGHT: 62 IN

## 2023-02-21 VITALS — HEART RATE: 88 BPM | OXYGEN SATURATION: 97 %

## 2023-02-21 PROCEDURE — 99213 OFFICE O/P EST LOW 20 MIN: CPT

## 2023-02-21 RX ORDER — ZOLPIDEM TARTRATE 10 MG/1
10 TABLET ORAL
Qty: 30 | Refills: 2 | Status: DISCONTINUED | COMMUNITY
Start: 2023-01-06 | End: 2023-02-21

## 2023-02-21 NOTE — HISTORY OF PRESENT ILLNESS
[TextBox_4] :  MCTD/Limited Scleroderma: consisting of Raynaud's, GERD (presumed esophageal dysmotility), telangiectases, sclerodactyly, RNP Ab. \par never smoker\par no active GERD- saw GI on Prilosec, Pepcid\par no sputum, \par no fever, chill, chest pain\par Last echo 7/22 no sig PH Dr Pate\par Rheumatology on MMF 2 gms NP Stamatos\par at baseline, no acute pulmonary complaints

## 2023-02-21 NOTE — PROCEDURE
[FreeTextEntry1] : CT scans reviewed and compared 3/22-3/21- stable NSIP like ILD\par \par \par CT chest 9/22 mild decrease GG bases from 3/22\par PFts 12/22 normal TLC and FVC, mild decrease DLCO- unchanged from 1/22\par

## 2023-02-21 NOTE — CONSULT LETTER
[Dear  ___] : Dear  [unfilled], [Consult Letter:] : I had the pleasure of evaluating your patient, [unfilled]. [Please see my note below.] : Please see my note below. [Sincerely,] : Sincerely, [FreeTextEntry3] : Cj Mei DO [facility]\par Pulmonary Critical Care\par Director Pulmonary Division\par Medical Director Respiratory Therapy\par [facility]\par \par

## 2023-02-21 NOTE — DISCUSSION/SUMMARY
[FreeTextEntry1] : Mixed CT/Scleroderma, slowly progressive ILD, dilated esophagus\par At baseline, no pulmonary complaints, takes nocturnal precautions, has adjustable bed- GI input noted\par last echocardiogram without  any pulmonary hypertension - 7/22 Cardiology following\par Centrilobular GG nodules predominantly lung bases, improved on current regimen 9/22-3/22\par also stable reticular scarring upper lobes\par Suspect combination of  esophageal reflux and Scleroderma related, no Environmental exposures\par Currently essentially asymptomatic, exercises regularly, hx Avascular necrosis\par Now on MMF 2 gms, has sulfa rash, discussed PJP prophylaxis with rheumatology\par Recent PFTs 12/22 stable- Normal FVC and TLC, mild decrease DLCO\par Will re evaluate 4 months or sooner if needed\par \par \par

## 2023-03-24 ENCOUNTER — RX RENEWAL (OUTPATIENT)
Age: 33
End: 2023-03-24

## 2023-04-07 ENCOUNTER — OFFICE (OUTPATIENT)
Dept: URBAN - METROPOLITAN AREA CLINIC 102 | Facility: CLINIC | Age: 33
Setting detail: OPHTHALMOLOGY
End: 2023-04-07
Payer: COMMERCIAL

## 2023-04-07 DIAGNOSIS — M35.9: ICD-10-CM

## 2023-04-07 DIAGNOSIS — Z79.891: ICD-10-CM

## 2023-04-07 PROCEDURE — 92134 CPTRZ OPH DX IMG PST SGM RTA: CPT | Performed by: OPHTHALMOLOGY

## 2023-04-07 PROCEDURE — 92083 EXTENDED VISUAL FIELD XM: CPT | Performed by: OPHTHALMOLOGY

## 2023-04-07 PROCEDURE — 92014 COMPRE OPH EXAM EST PT 1/>: CPT | Performed by: OPHTHALMOLOGY

## 2023-04-07 ASSESSMENT — REFRACTION_AUTOREFRACTION
OD_SPHERE: -0.50
OD_CYLINDER: -0.25
OS_SPHERE: -0.50
OD_AXIS: 168
OS_AXIS: 167
OS_CYLINDER: -0.50

## 2023-04-07 ASSESSMENT — SPHEQUIV_DERIVED
OS_SPHEQUIV: -0.75
OD_SPHEQUIV: -0.625

## 2023-04-07 ASSESSMENT — AXIALLENGTH_DERIVED
OS_AL: 24.1255
OD_AL: 24.0269

## 2023-04-07 ASSESSMENT — KERATOMETRY
METHOD_AUTO_MANUAL: AUTO
OS_K1POWER_DIOPTERS: 42.50
OD_AXISANGLE_DEGREES: 095
OS_K2POWER_DIOPTERS: 43.25
OD_K2POWER_DIOPTERS: 43.50
OS_AXISANGLE_DEGREES: 073
OD_K1POWER_DIOPTERS: 42.50

## 2023-04-07 ASSESSMENT — VISUAL ACUITY
OD_BCVA: 20/20
OS_BCVA: 20/20-2

## 2023-04-07 ASSESSMENT — CONFRONTATIONAL VISUAL FIELD TEST (CVF)
OD_FINDINGS: FULL
OS_FINDINGS: FULL

## 2023-04-07 ASSESSMENT — TONOMETRY: OD_IOP_MMHG: 10

## 2023-04-11 ENCOUNTER — APPOINTMENT (OUTPATIENT)
Dept: DERMATOLOGY | Facility: CLINIC | Age: 33
End: 2023-04-11
Payer: COMMERCIAL

## 2023-04-11 PROCEDURE — 17106 DSTR CUT VSC PRLF LES<10SQCM: CPT | Mod: 79

## 2023-04-11 NOTE — REASON FOR VISIT
[FreeTextEntry2] : IPL for spider telangiectasias associated with MCTD.  Patient is s/p multiple txs with the PDL.

## 2023-04-14 ENCOUNTER — APPOINTMENT (OUTPATIENT)
Dept: RHEUMATOLOGY | Facility: CLINIC | Age: 33
End: 2023-04-14
Payer: COMMERCIAL

## 2023-04-14 VITALS
DIASTOLIC BLOOD PRESSURE: 90 MMHG | HEART RATE: 92 BPM | HEIGHT: 62 IN | BODY MASS INDEX: 27.05 KG/M2 | SYSTOLIC BLOOD PRESSURE: 140 MMHG | OXYGEN SATURATION: 96 % | WEIGHT: 147 LBS | TEMPERATURE: 98 F

## 2023-04-14 PROCEDURE — 99214 OFFICE O/P EST MOD 30 MIN: CPT

## 2023-04-17 NOTE — HISTORY OF PRESENT ILLNESS
[FreeTextEntry1] : 4/14/2023\par \par - Has been great, w/ no issues \par - joints are good....hands have improved and able to do everything she needs to do \par - exercising.....has noticed some redness in between her legs that she does not believe it is due to chafing and it last a week \par - denies any skin tightness besides in her fingers...mouth does not feel any tighter than nl....recently went to dentist and everything is okay\par \par - on no steroids \par - still taking cellcept 1000 mg BID \par - breathing well.....last  PFTs 12/22 improved overall with DLCO from 58-68 with addition of MMF \par \par - GI issues controlled on 20 mg of Omeprazole in the morning and 40 mg of famotidine at night.....has a f/u appointment in 6/2023......has not had repeat EGD \par \par \par \par 1.  MCTD/Limited Scleroderma:  consisting of Raynaud's, GERD (presumed esophageal dysmotility), telangiectases, sclerodactyly, RNP Ab.  \par 2.  GERD. Pt on ranitidine. GERD symptoms on and off. No dysphagia. \par 3.  Hypothyroidism\par 4.  Myopathy: mild elevation of CKs on a couple of occasions\par 5.  Plaquenil use:  no ocular symptoms. \par 6.  Oral contraceptive use: changed brands to a low estrogen containing pill.\par 7.  Raynauds:  more problematic in the hands than feet. with pitting  . \par 9.  Avascular necrosis of hips:   bilateral.  moderate to severe R > L w/ advanced DJD   An x-ray was read as moderate OA of the hip (acetabular sclerosis and osteophytes).  \par 10. PNa 2017  Antibiotics were administered, and she improved.\par 12.  Resting tachycardia: no dyspnea or chest pain\par

## 2023-04-17 NOTE — PHYSICAL EXAM
[General Appearance - Alert] : alert [General Appearance - In No Acute Distress] : in no acute distress [General Appearance - Well Nourished] : well nourished [General Appearance - Well Developed] : well developed [General Appearance - Well-Appearing] : healthy appearing [Sclera] : the sclera and conjunctiva were normal [Extraocular Movements] : extraocular movements were intact [Strabismus] : no strabismus was seen [Outer Ear] : the ears and nose were normal in appearance [Examination Of The Oral Cavity] : the lips and gums were normal [Nasal Cavity] : the nasal mucosa and septum were normal [Oropharynx] : the oropharynx was normal [Neck Appearance] : the appearance of the neck was normal [Respiration, Rhythm And Depth] : normal respiratory rhythm and effort [Exaggerated Use Of Accessory Muscles For Inspiration] : no accessory muscle use [Auscultation Breath Sounds / Voice Sounds] : lungs were clear to auscultation bilaterally [Heart Rate And Rhythm] : heart rate was normal and rhythm regular [Heart Sounds] : normal S1 and S2 [Heart Sounds Gallop] : no gallops [Murmurs] : no murmurs [Heart Sounds Pericardial Friction Rub] : no pericardial rub [Edema] : there was no peripheral edema [Veins - Varicosity Changes] : there were no varicosital changes [Cervical Lymph Nodes Enlarged Posterior Bilaterally] : posterior cervical [Cervical Lymph Nodes Enlarged Anterior Bilaterally] : anterior cervical [Supraclavicular Lymph Nodes Enlarged Bilaterally] : supraclavicular [Abnormal Walk] : normal gait [Nail Clubbing] : no clubbing  or cyanosis of the fingernails [Musculoskeletal - Swelling] : no joint swelling seen [Motor Tone] : muscle strength and tone were normal [Skin Color & Pigmentation] : normal skin color and pigmentation [Skin Turgor] : normal skin turgor [] : no rash [Sensation] : the sensory exam was normal to light touch and pinprick [Motor Exam] : the motor exam was normal [Oriented To Time, Place, And Person] : oriented to person, place, and time [Impaired Insight] : insight and judgment were intact [Affect] : the affect was normal [FreeTextEntry1] : strength 5/5 X 4 extremities

## 2023-04-17 NOTE — REVIEW OF SYSTEMS
[Cough] : cough [Heartburn] : heartburn [Anxiety] : anxiety [Negative] : Heme/Lymph [FreeTextEntry2] : stable wt battling recurrent viral infections - RESOLVED [FreeTextEntry7] : on 20 mg of omeprazole in the morning and 40 mg of famotidine at night for reflux which is controlled [de-identified] : improved [de-identified] : laser tx for telangiectasias and digital pitting

## 2023-04-17 NOTE — ASSESSMENT
[FreeTextEntry1] : 32 yo wf with long hx Limited SSc, MCTD + RNP w/  advanced DJD b/l hips ? AVN, advanced raynauds, telangiectasias \par \par \par 1)  MCTD/Limited Scleroderma:  SHERI 1:2560S, + RNP > 8.0, SM 3.8, + RF 45 (neg CCP) w/ CK > 200 (intermittently) but no  overt weakness now or in past.   All other serologies to include APS neg w/ nl C3/4, dsDNA, SSA/B \par Primary sx:  raynauds, sclerodactyly, telangiectasias, mild sicca/ keratoconjunctivitis, NEW onset calcinosis trivedi surface both hands and heels of both feet..  12/21.. in addition to low grade fevers, arthralgias / myalgias and fatigue when "flaring". Added MMF in past 6 m 3 gm daily (4/22 well tolerated )  and continues w/ HCQ (200 mg= 3.07 mg/kg) with routine opth exam fine 3/22- > lowered to 2 gm MMF and remains stable.  \par When doesn't have URI feels well exercises without limitation but since last visit and no recent URI (on lower dose MMF) \par Concerned about immunosuppression- with routine exposure to young children... will continue w/ nasal steroids but wonder if anything should be considered.  Doing well not, no further tx needed.. but in future may consider ?? montelukast.. really can't lower DMARds... is also trying to make changes at work...\par Summary Status by symptom:\par - Raynaud's moderate to severe at times, tried and failed to tolerate nifedipine/ amlodipine now on felodipine at 10 mg and doing well now. Noted digital pitting but no report of active ulcerations -.. nothing for past few ys.. .   Sclerodactyly can't really say if any better but definitely not worse. ROM limited by tightness but  str good and functionally without limitations at this point.\par - GERD (presumed esophageal dysmotility) severe at times w/ dx H Pylori 12/20 completed triple tx, not well tolerated, now sx better on daily PPI 20 mg and H2 40 mg w/ prn sulcrafate (was NOT effective).. working w/ Dr Chapin.. pending updated EGD (last study 12/20- mild gastritis but otherwise neg- evidence of dysmotility??),\par - Telangiectases/ Calcinosis:  moderate to severe over face, chest s/p laser tx in past and again working with Dr. Rutherford now.... pleased with outcome.  Calcinosis noted 12/21- stable not painful.. if progresses b/l hands and now noted on heels of both feet.. working with podiatry q 6 m reduced through scraping.. will refer to OT for hand tx as needed (not necessary at this time.. though would be helpful given sclerodactyly -not necessary at this point)\par - Sclerodactyly:  moderate in limited pattern, distal to PIPs and around mouth ..stable x many ys and hands look sl better . \par - Cardiopulmonary:  Routine PFTs w/ DLCO 79-> 74-> 68 (2018)-> 70 (2019)-> 58 (3/21)-> 68 (12/22) w/ volumes relatively well preserved over time... no reported hx of pleuropericardial effusion and completely denies SOB/JENSEN  cough or change in activity tolerance (exercises routinely).  updated CTScan 9/22 actually improved with mild- moderate resolution of GGO now on MMF.. \par - SerositiS:  elevated LFTs to 900 in past x 1 but repeat studies nl, etiology unclear. No renal dysfunction, no pleural/pericardial effusions.   \par Proteinuria:  intermittently + 200-400mg currently controlled  \par - Myopathy:  mildly elevated CK max 259 7/20...  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  Hypothyroidism on replacement w/ nl TSH \par \par 3) Family planning:  Oral contraceptive use: changed brands to a low estrogen containing pill- currently off, not in relationship, denies sexual activity.  Discussed current reproductive guidelines .... and is interested in pregnancy in next few ys.  Discussed use of MMF and need to stop 6-8 wks (at minimum) before conception... understood clearly, will contact - unchanged today \par  \par 4)  OA hips: primary vs. secondary. ? AVN (need updated eval). bilateral R> L w/ limited ROM and daily pain worse at times, doing well now \par Xray Hips:  moderate OA, etiology\par NOTE: \par Tx  acupuncture and PT w/ limited benefit \par \par \par Plan:\par \par - continue on 2.5 mg of ambien can go up to 10 mg PRN for sleep disruption when sick \par \par - complete labs before next visit \par \par - contacting GI about repeat EGD \par \par - referring to GYN [ GET THE NAME OF GYN FRIEND OF [facility] ]\par \par - Continue HCQ... 200 mg routinely... \par \par - recommended stopping MMF 6 weeks prior to conception only.. then assess for disease stability. \par \par - Continue f/u  (Dr Jeffrey Pate [phone]).. as needed, at least annually. .. \par \par - Continue  felodipine 10 mg, call immediately if any ulcerations present  \par \par - continue to work with derm\par \par - shingrix vaccination recommended \par \par - Is aware to call if there is any change in her underlying symptoms.\par \par - RTO in august \par \par - Keep using mometasone nasal spray through winter months \par \par \par

## 2023-04-22 LAB
ALBUMIN SERPL ELPH-MCNC: 4.7 G/DL
ALP BLD-CCNC: 101 U/L
ALT SERPL-CCNC: 17 U/L
ANION GAP SERPL CALC-SCNC: 11 MMOL/L
APPEARANCE: CLEAR
AST SERPL-CCNC: 21 U/L
BASOPHILS # BLD AUTO: 0.05 K/UL
BASOPHILS NFR BLD AUTO: 0.6 %
BILIRUB SERPL-MCNC: 0.2 MG/DL
BILIRUBIN URINE: NEGATIVE
BLOOD URINE: NEGATIVE
BUN SERPL-MCNC: 11 MG/DL
C3 SERPL-MCNC: 136 MG/DL
C4 SERPL-MCNC: 28 MG/DL
CALCIUM SERPL-MCNC: 9.4 MG/DL
CHLORIDE SERPL-SCNC: 102 MMOL/L
CK SERPL-CCNC: 132 U/L
CO2 SERPL-SCNC: 27 MMOL/L
COLOR: YELLOW
CREAT SERPL-MCNC: 0.62 MG/DL
CREAT SPEC-SCNC: 66 MG/DL
CREAT/PROT UR: 0.1 RATIO
CRP SERPL-MCNC: 3 MG/L
DSDNA AB SER-ACNC: <12 IU/ML
EGFR: 121 ML/MIN/1.73M2
EOSINOPHIL # BLD AUTO: 0.14 K/UL
EOSINOPHIL NFR BLD AUTO: 1.7 %
ERYTHROCYTE [SEDIMENTATION RATE] IN BLOOD BY WESTERGREN METHOD: 8 MM/HR
GLUCOSE QUALITATIVE U: NEGATIVE
GLUCOSE SERPL-MCNC: 115 MG/DL
HCT VFR BLD CALC: 39.5 %
HGB BLD-MCNC: 12.4 G/DL
IMM GRANULOCYTES NFR BLD AUTO: 0.2 %
KETONES URINE: NEGATIVE
LEUKOCYTE ESTERASE URINE: NEGATIVE
LYMPHOCYTES # BLD AUTO: 1.88 K/UL
LYMPHOCYTES NFR BLD AUTO: 22.8 %
MAN DIFF?: NORMAL
MCHC RBC-ENTMCNC: 26.8 PG
MCHC RBC-ENTMCNC: 31.4 GM/DL
MCV RBC AUTO: 85.5 FL
MONOCYTES # BLD AUTO: 0.82 K/UL
MONOCYTES NFR BLD AUTO: 10 %
NEUTROPHILS # BLD AUTO: 5.32 K/UL
NEUTROPHILS NFR BLD AUTO: 64.7 %
NITRITE URINE: NEGATIVE
PH URINE: 7.5
PLATELET # BLD AUTO: 396 K/UL
POTASSIUM SERPL-SCNC: 4.5 MMOL/L
PROT SERPL-MCNC: 7.1 G/DL
PROT UR-MCNC: 8 MG/DL
PROTEIN URINE: NORMAL
RBC # BLD: 4.62 M/UL
RBC # FLD: 12.5 %
SODIUM SERPL-SCNC: 140 MMOL/L
SPECIFIC GRAVITY URINE: 1.01
UROBILINOGEN URINE: NORMAL
WBC # FLD AUTO: 8.23 K/UL

## 2023-06-05 ENCOUNTER — APPOINTMENT (OUTPATIENT)
Dept: GASTROENTEROLOGY | Facility: CLINIC | Age: 33
End: 2023-06-05
Payer: COMMERCIAL

## 2023-06-05 VITALS
OXYGEN SATURATION: 97 % | WEIGHT: 140 LBS | HEIGHT: 62 IN | HEART RATE: 103 BPM | DIASTOLIC BLOOD PRESSURE: 82 MMHG | TEMPERATURE: 97.7 F | BODY MASS INDEX: 25.76 KG/M2 | SYSTOLIC BLOOD PRESSURE: 124 MMHG

## 2023-06-05 PROCEDURE — 99213 OFFICE O/P EST LOW 20 MIN: CPT

## 2023-06-05 NOTE — PHYSICAL EXAM
[Alert] : alert [Normal Voice/Communication] : normal voice/communication [Healthy Appearing] : healthy appearing [No Acute Distress] : no acute distress [Sclera] : the sclera and conjunctiva were normal [Hearing Threshold Finger Rub Not [place]] : hearing was normal [Normal Lips/Gums] : the lips and gums were normal [Oropharynx] : the oropharynx was normal [Normal Appearance] : the appearance of the neck was normal [No Neck Mass] : no neck mass was observed [No Respiratory Distress] : no respiratory distress [No Acc Muscle Use] : no accessory muscle use [Respiration, Rhythm And Depth] : normal respiratory rhythm and effort [Auscultation Breath Sounds / Voice Sounds] : lungs were clear to auscultation bilaterally [Heart Rate And Rhythm] : heart rate was normal and rhythm regular [Normal S1, S2] : normal S1 and S2 [Murmurs] : no murmurs [Bowel Sounds] : normal bowel sounds [Abdomen Tenderness] : non-tender [No Masses] : no abdominal mass palpated [Abdomen Soft] : soft [] : no hepatosplenomegaly [Oriented To Time, Place, And Person] : oriented to person, place, and time

## 2023-06-05 NOTE — ASSESSMENT
[FreeTextEntry1] : This is a pleasant 33-year-old female with chronic GERD.  She was able to taper down her omeprazole from 40 mg daily to 20 mg every other day in conjunction with famotidine 40 mg at bedtime.  No alarm GI symptoms.  She will continue on this regimen.  She is to call me if she has any questions or concerns otherwise I will see her for follow-up visit in 1 year.

## 2023-06-05 NOTE — HISTORY OF PRESENT ILLNESS
[FreeTextEntry1] : Lilia presents for a follow-up visit.  She relates that she was able to cut down on her omeprazole from 40 mg daily to 20 mg daily.  She tried to extend the frequency but can only go as far as 20 mg every other day.  She is on famotidine 40 mg at bedtime.  With this current regimen she feels comfortable.  She denies dysphagia or odynophagia.  She denies abdominal pain, nausea or vomiting.  She denies changes in bowel habits.  She is trying to lose weight by getting a .

## 2023-06-08 ENCOUNTER — APPOINTMENT (OUTPATIENT)
Dept: DERMATOLOGY | Facility: CLINIC | Age: 33
End: 2023-06-08
Payer: COMMERCIAL

## 2023-06-08 PROCEDURE — 17106 DSTR CUT VSC PRLF LES<10SQCM: CPT | Mod: 58

## 2023-06-13 ENCOUNTER — APPOINTMENT (OUTPATIENT)
Dept: PULMONOLOGY | Facility: CLINIC | Age: 33
End: 2023-06-13
Payer: COMMERCIAL

## 2023-06-13 VITALS — WEIGHT: 140 LBS | HEIGHT: 62 IN | BODY MASS INDEX: 25.76 KG/M2

## 2023-06-13 VITALS — OXYGEN SATURATION: 98 %

## 2023-06-13 VITALS — DIASTOLIC BLOOD PRESSURE: 76 MMHG | RESPIRATION RATE: 16 BRPM | SYSTOLIC BLOOD PRESSURE: 124 MMHG

## 2023-06-13 PROCEDURE — 99213 OFFICE O/P EST LOW 20 MIN: CPT

## 2023-06-13 NOTE — HISTORY OF PRESENT ILLNESS
[TextBox_4] :  MCTD/Limited Scleroderma: consisting of Raynaud's, GERD (presumed esophageal dysmotility), telangiectases, sclerodactyly, RNP Ab. \par never smoker\par no active GERD- saw GI on Prilosec, Pepcid\par no sputum, \par no fever, chill, chest pain\par Last echo 7/22 no sig PH Dr Pate\par Rheumatology on MMF 2 gms NP Stamatos\par at baseline, no acute pulmonary complaints\par \par 6/13/23\par doing well\par no acute pulmonary complaints\par saw GI , tapering omeprazole, Pepcid q HS

## 2023-06-13 NOTE — DISCUSSION/SUMMARY
[FreeTextEntry1] : Mixed CT/Scleroderma, slowly progressive ILD, dilated esophagus\par At baseline, no pulmonary complaints, takes nocturnal precautions, has adjustable bed- GI input noted\par last echocardiogram without  any pulmonary hypertension - 7/22 Cardiology following\par Centrilobular GG nodules predominantly lung bases, improved on current regimen 9/22-3/22\par also stable reticular scarring upper lobes\par Suspect combination of  esophageal reflux and Scleroderma related, no Environmental exposures\par Currently essentially asymptomatic, exercises regularly, hx Avascular necrosis\par Now on MMF 2 gms, has sulfa rash, discussed PJP prophylaxis with rheumatology\par Recent PFTs 12/22 stable- Normal FVC and TLC, mild decrease DLCO\par Will re evaluate 4 months or sooner if needed, no further testing given stability\par \par \par

## 2023-06-13 NOTE — CONSULT LETTER
[Dear  ___] : Dear  [unfilled], [Consult Letter:] : I had the pleasure of evaluating your patient, [unfilled]. [Please see my note below.] : Please see my note below. [Sincerely,] : Sincerely, [FreeTextEntry3] : Cj Mei DO [facility]\par Pulmonary Critical Care\par Director Pulmonary Division\par Medical Director Respiratory Therapy\par [facility]\par \par

## 2023-07-10 ENCOUNTER — OFFICE (OUTPATIENT)
Dept: URBAN - METROPOLITAN AREA CLINIC 102 | Facility: CLINIC | Age: 33
Setting detail: OPHTHALMOLOGY
End: 2023-07-10
Payer: COMMERCIAL

## 2023-07-10 DIAGNOSIS — Z79.891: ICD-10-CM

## 2023-07-10 DIAGNOSIS — M35.9: ICD-10-CM

## 2023-07-10 PROCEDURE — 92134 CPTRZ OPH DX IMG PST SGM RTA: CPT | Performed by: OPHTHALMOLOGY

## 2023-07-10 PROCEDURE — 92014 COMPRE OPH EXAM EST PT 1/>: CPT | Performed by: OPHTHALMOLOGY

## 2023-07-10 ASSESSMENT — VISUAL ACUITY
OD_BCVA: 20/20
OS_BCVA: 20/25-

## 2023-07-10 ASSESSMENT — TONOMETRY
OS_IOP_MMHG: 10
OD_IOP_MMHG: 12

## 2023-07-10 ASSESSMENT — AXIALLENGTH_DERIVED
OS_AL: 23.9263
OD_AL: 24.1284

## 2023-07-10 ASSESSMENT — REFRACTION_AUTOREFRACTION
OS_AXIS: 164
OS_SPHERE: 0.00
OD_SPHERE: -0.75
OD_CYLINDER: -0.25
OS_CYLINDER: -0.75
OD_AXIS: 009

## 2023-07-10 ASSESSMENT — KERATOMETRY
METHOD_AUTO_MANUAL: AUTO
OS_K1POWER_DIOPTERS: 42.50
OD_K1POWER_DIOPTERS: 42.50
OS_K2POWER_DIOPTERS: 43.50
OD_AXISANGLE_DEGREES: 099
OD_K2POWER_DIOPTERS: 43.50
OS_AXISANGLE_DEGREES: 078

## 2023-07-10 ASSESSMENT — CONFRONTATIONAL VISUAL FIELD TEST (CVF)
OD_FINDINGS: FULL
OS_FINDINGS: FULL

## 2023-07-10 ASSESSMENT — SPHEQUIV_DERIVED
OD_SPHEQUIV: -0.875
OS_SPHEQUIV: -0.375

## 2023-07-17 ENCOUNTER — APPOINTMENT (OUTPATIENT)
Dept: CARDIOLOGY | Facility: CLINIC | Age: 33
End: 2023-07-17

## 2023-07-21 NOTE — DATA REVIEWED
Hi, this is Jose Kay calling my phone. My birthday is [birth year]. When I was to see the doctor, Shobha Bridges, when I was in last time, she was going to give me a prescription for a water pill. It was hydrochlorothiazide or something at 25 milligrams. And I went to the pharmacy. I never did get it. They never, they said they never saw it with my other prescriptions. I'm still trying to see if maybe she could call it into the [facility] and brought itself. And also there was a problem with the other one from [place] where I have to get my elementary romantic. So they're going to, they'll contact her on that. Never did come through to them. And then if I could just get the water pill called in to [facility], I would appreciate it. OK. Thank you. Bye.  Bye.
[FreeTextEntry1] : Labs: \par 3/21 ESr 22 Myomarker 3 +   nl cmp, CBC ck, CRP, C3/4, w/ neg dsDNA, LAC/ APS, PCR\par \par \par               9/18/18 8/14/19 2021 \par PFTs  \par FVC         88->             90->        85\par TLC          98->             84->        80\par DLCO       68->             70->        58\par \par CTScan chest 3/22/21 peripheral nodular ggo LLs w/ no lymphadenopathy\par \par Echo:  10/16/20 nl w/ no mention PAH.. but RA/RV nl in size\par \par Xray b/l hips 3/21 symmetric djd spurring b/l  along articular margins without  gross JS loss without obvious evidence of AVN

## 2023-07-27 ENCOUNTER — APPOINTMENT (OUTPATIENT)
Dept: DERMATOLOGY | Facility: CLINIC | Age: 33
End: 2023-07-27
Payer: COMMERCIAL

## 2023-07-27 PROCEDURE — 17106 DSTR CUT VSC PRLF LES<10SQCM: CPT | Mod: 79

## 2023-07-27 NOTE — REASON FOR VISIT
[FreeTextEntry2] : Matted telangiectasias, in patient with MCTD.  She feels significant improvement.

## 2023-08-01 ENCOUNTER — RX RENEWAL (OUTPATIENT)
Age: 33
End: 2023-08-01

## 2023-08-03 LAB
ALBUMIN SERPL ELPH-MCNC: 5 G/DL
ALP BLD-CCNC: 108 U/L
ALT SERPL-CCNC: 28 U/L
ANION GAP SERPL CALC-SCNC: 13 MMOL/L
APPEARANCE: CLEAR
AST SERPL-CCNC: 31 U/L
BILIRUB SERPL-MCNC: 0.3 MG/DL
BILIRUBIN URINE: NEGATIVE
BLOOD URINE: NEGATIVE
BUN SERPL-MCNC: 13 MG/DL
C3 SERPL-MCNC: 133 MG/DL
C4 SERPL-MCNC: 23 MG/DL
CALCIUM SERPL-MCNC: 10.2 MG/DL
CHLORIDE SERPL-SCNC: 99 MMOL/L
CK SERPL-CCNC: 129 U/L
CO2 SERPL-SCNC: 26 MMOL/L
COLOR: YELLOW
CREAT SERPL-MCNC: 0.58 MG/DL
CREAT SPEC-SCNC: 12 MG/DL
CREAT/PROT UR: NORMAL RATIO
CRP SERPL-MCNC: <3 MG/L
DSDNA AB SER-ACNC: <12 IU/ML
EGFR: 123 ML/MIN/1.73M2
ERYTHROCYTE [SEDIMENTATION RATE] IN BLOOD BY WESTERGREN METHOD: 30 MM/HR
GLUCOSE QUALITATIVE U: NEGATIVE MG/DL
GLUCOSE SERPL-MCNC: 81 MG/DL
KETONES URINE: NEGATIVE MG/DL
LEUKOCYTE ESTERASE URINE: NEGATIVE
NITRITE URINE: NEGATIVE
PH URINE: 7
POTASSIUM SERPL-SCNC: 4.2 MMOL/L
PROT SERPL-MCNC: 8 G/DL
PROT UR-MCNC: <4 MG/DL
PROTEIN URINE: NEGATIVE MG/DL
SODIUM SERPL-SCNC: 138 MMOL/L
SPECIFIC GRAVITY URINE: 1
UROBILINOGEN URINE: 0.2 MG/DL

## 2023-08-11 ENCOUNTER — APPOINTMENT (OUTPATIENT)
Dept: RHEUMATOLOGY | Facility: CLINIC | Age: 33
End: 2023-08-11
Payer: COMMERCIAL

## 2023-08-11 VITALS
SYSTOLIC BLOOD PRESSURE: 134 MMHG | OXYGEN SATURATION: 90 % | TEMPERATURE: 97.7 F | HEIGHT: 62 IN | HEART RATE: 80 BPM | WEIGHT: 155 LBS | BODY MASS INDEX: 28.52 KG/M2 | DIASTOLIC BLOOD PRESSURE: 84 MMHG

## 2023-08-11 DIAGNOSIS — J30.9 ALLERGIC RHINITIS, UNSPECIFIED: ICD-10-CM

## 2023-08-11 DIAGNOSIS — K22.4 DYSKINESIA OF ESOPHAGUS: ICD-10-CM

## 2023-08-11 DIAGNOSIS — F51.02 ADJUSTMENT INSOMNIA: ICD-10-CM

## 2023-08-11 DIAGNOSIS — M34.9 SYSTEMIC SCLEROSIS, UNSPECIFIED: ICD-10-CM

## 2023-08-11 PROCEDURE — 99214 OFFICE O/P EST MOD 30 MIN: CPT

## 2023-08-11 RX ORDER — SUCRALFATE 1 G/10ML
1 SUSPENSION ORAL
Qty: 3 | Refills: 3 | Status: DISCONTINUED | COMMUNITY
Start: 2023-01-06 | End: 2023-08-11

## 2023-08-11 RX ORDER — FELODIPINE 10 MG/1
10 TABLET, EXTENDED RELEASE ORAL
Qty: 90 | Refills: 3 | Status: ACTIVE | COMMUNITY
Start: 2021-03-05 | End: 1900-01-01

## 2023-08-11 RX ORDER — ZOSTER VACCINE RECOMBINANT, ADJUVANTED 50 MCG/0.5
50 KIT INTRAMUSCULAR
Qty: 1 | Refills: 1 | Status: DISCONTINUED | COMMUNITY
Start: 2022-07-05 | End: 2023-08-11

## 2023-08-11 NOTE — REVIEW OF SYSTEMS
[Cough] : cough [Heartburn] : heartburn [Anxiety] : anxiety [Negative] : Heme/Lymph [FreeTextEntry2] : stable wt battling recurrent viral infections - RESOLVED [FreeTextEntry7] : on 20 mg of omeprazole in the morning and 40 mg of famotidine at night for reflux which is controlled [de-identified] : improved [de-identified] : laser tx for telangiectasias and digital pitting

## 2023-08-11 NOTE — ASSESSMENT
[FreeTextEntry1] : 34 yo wf with long hx Limited SSc, MCTD + RNP w/  advanced DJD b/l hips ? AVN, advanced raynauds, telangiectasias    1)  MCTD/Limited Scleroderma:  SHERI 1:2560S, + RNP > 8.0, SM 3.8, + RF 45 (neg CCP) w/ CK > 200 (intermittently) but no  overt weakness now or in past.   All other serologies to include APS neg w/ nl C3/4, dsDNA, SSA/B  Primary sx:  raynauds, sclerodactyly, telangiectasias, mild sicca/ keratoconjunctivitis, NEW onset calcinosis trivedi surface both hands and heels of both feet..  12/21.. in addition to low grade fevers, arthralgias / myalgias and fatigue when "flaring". Added MMF in past 6 m 3 gm daily (4/22 well tolerated )  and continues w/ HCQ (200 mg= 3.07 mg/kg) with routine opth exam fine 3/22- > lowered to 2 gm MMF and remains stable.   When doesn't have URI feels well exercises without limitation but since last visit and no recent URI (on lower dose MMF)  Concerned about immunosuppression- with routine exposure to young children... will continue w/ nasal steroids but wonder if anything should be considered.  Doing well not, no further tx needed.. but in future may consider ?? montelukast.. really can't lower DMARds... is also trying to make changes at work... Summary Status by symptom: - Raynaud's moderate to severe at times, tried and failed to tolerate nifedipine/ amlodipine now on felodipine at 10 mg and doing well now. Noted digital pitting but no report of active ulcerations -.. nothing for past few ys.. .   Sclerodactyly can't really say if any better but definitely not worse. ROM limited by tightness but  str good and functionally without limitations at this point. - GERD (presumed esophageal dysmotility) severe at times w/ dx H Pylori 12/20 completed triple tx, not well tolerated, now sx better on daily PPI 20 mg and H2 40 mg w/ prn sulcrafate (was NOT effective).. working w/ Dr Red.. pending updated EGD (last study 12/20- mild gastritis but otherwise neg- evidence of dysmotility??), - Telangiectases/ Calcinosis:  moderate to severe over face, chest s/p laser tx in past and again working with Dr. Rutherford now.... pleased with outcome.  Calcinosis noted 12/21- stable not painful.. if progresses b/l hands and now noted on heels of both feet.. working with podiatry q 6 m reduced through scraping.. will refer to OT for hand tx as needed (not necessary at this time.. though would be helpful given sclerodactyly -not necessary at this point) - Sclerodactyly:  moderate in limited pattern, distal to PIPs and around mouth ..stable x many ys and hands look sl better .  - Cardiopulmonary:  Routine PFTs w/ DLCO 79-> 74-> 68 (2018)-> 70 (2019)-> 58 (3/21)-> 68 (12/22) w/ volumes relatively well preserved over time... no reported hx of pleuropericardial effusion and completely denies SOB/JENSEN  cough or change in activity tolerance (exercises routinely).  updated CTScan 9/22 actually improved with mild- moderate resolution of GGO now on MMF..  - SerositiS:  elevated LFTs to 900 in past x 1 but repeat studies nl, etiology unclear. No renal dysfunction, no pleural/pericardial effusions.    Proteinuria:  intermittently + 200-400mg currently controlled   - Myopathy:  mildly elevated CK max 259 7/20...  w/ no previous weakness now or in past still - now nl  NOTE:  3/21 Myomarker 3 NEG 2019 mild proteinuria PCR 0.4-> nl now  neg REGGIE, Scl70, RNA polymerase, ANCA  - 2017 adm to [place] w/ PNA + response to antibiotics w/ + response immediately - 2015/2016 she went off Plaquenil but restarted it because of the Raynaud's/digital pits, restarted and sx stable  2).  Hypothyroidism on replacement w/ nl TSH   3) Family planning:  Oral contraceptive use: changed brands to a low estrogen containing pill- currently off, not in relationship, denies sexual activity.  Discussed current reproductive guidelines .... and is interested in pregnancy in next few ys.  Discussed use of MMF and need to stop 6-8 wks (at minimum) before conception... understood clearly, will contact - unchanged today    4)  OA hips: primary vs. secondary. ? AVN (need updated eval). bilateral R> L w/ limited ROM and daily pain worse at times, doing well now  Xray Hips:  moderate OA, etiology NOTE:  Tx  acupuncture and PT w/ limited benefit    Plan:  - continue on 2.5 mg of ambien can go up to 10 mg PRN for sleep disruption when sick  - trial amitriptyline: start 5- 10 mg q HS 1-4 hours before bed... if feel groggy in am take an hour earlier... you should not wake up feeling tired... but refreshed. Goal is to stay asleep, if you wake you fall immediately back to sleep.  Increase in 2 wks if not better but still tolerating and continue if needed up to 40 mg at bedtime (1-4 hs before bed).    - check heels both feet for calcinosis... next visit and ?? did she get Shingrix?    - complete labs before next visit   - contacting GI about repeat EGD ... working w/ Dr Red (? EGD scheduled)   - ? do you still need Uro GYN specialist   - Continue HCQ... 200 mg routinely... eye exam q 6m  - recommended stopping MMF 6 weeks prior to conception only.. then assess for disease stability.   - Continue f/u  (Dr Jeffrey Pate [phone]).. as needed, at least annually. ..   - Continue  felodipine 10 mg, call immediately if any ulcerations present    - continue to work with derm  - Is aware to call if there is any change in her underlying symptoms.  - RTO 6 m  - Keep using mometasone nasal spray through winter months

## 2023-08-11 NOTE — HISTORY OF PRESENT ILLNESS
[FreeTextEntry1] : 8/11/23  - Has been great, w/ no issues  - fatigue still #1 issue, at times considerable but rarely interferes with activities.... recent issues w/ Staying asleep and when wakes 2-4 x night can be awake for up to 40 mins despite using nighttime stories.. another NP strategies. Rare use of ambien and when taken only low dose 2.5 mg..  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 would like change)  - joints are good....hands have improved and able to do everything she needs to do  - exercising.....has noticed some redness in between her legs that she does not believe it is due to chafing and it last a week  - denies any skin tightness besides in her fingers...mouth does not feel any tighter than nl....recently went to dentist and everything is okay  - on no steroids  - still taking cellcept 1000 mg BID  - breathing well.....last  PFTs 12/22 improved overall with DLCO from 58-68 with addition of MMF   - GI issues controlled on 20 mg of Omeprazole at times QOD.. in the morning and 40 mg of famotidine at night.....Still hasn't had repeat EGD but working with Brianne Red.   1.  MCTD/Limited Scleroderma:  consisting of Raynaud's, GERD (presumed esophageal dysmotility), telangiectases, sclerodactyly, RNP Ab.   2.  GERD. Pt on ranitidine. GERD symptoms on and off. No dysphagia.  3.  Hypothyroidism 4.  Myopathy: mild elevation of CKs on a couple of occasions 5.  Plaquenil use:  no ocular symptoms.  6.  Oral contraceptive use: changed brands to a low estrogen containing pill. 7.  Raynauds:  more problematic in the hands than feet. with pitting  .  9.  Avascular necrosis of hips:   bilateral.  moderate to severe R > L w/ advanced DJD   An x-ray was read as moderate OA of the hip (acetabular sclerosis and osteophytes).   10. PNa 2017  Antibiotics were administered, and she improved. 12.  Resting tachycardia: no dyspnea or chest pain

## 2023-08-11 NOTE — PHYSICAL EXAM
[General Appearance - Alert] : alert [General Appearance - In No Acute Distress] : in no acute distress [General Appearance - Well Nourished] : well nourished [General Appearance - Well Developed] : well developed [General Appearance - Well-Appearing] : healthy appearing [Sclera] : the sclera and conjunctiva were normal [Outer Ear] : the ears and nose were normal in appearance [Examination Of The Oral Cavity] : the lips and gums were normal [Nasal Cavity] : the nasal mucosa and septum were normal [Oropharynx] : the oropharynx was normal [Neck Appearance] : the appearance of the neck was normal [Respiration, Rhythm And Depth] : normal respiratory rhythm and effort [Exaggerated Use Of Accessory Muscles For Inspiration] : no accessory muscle use [Auscultation Breath Sounds / Voice Sounds] : lungs were clear to auscultation bilaterally [Heart Rate And Rhythm] : heart rate was normal and rhythm regular [Heart Sounds] : normal S1 and S2 [Heart Sounds Gallop] : no gallops [Murmurs] : no murmurs [Heart Sounds Pericardial Friction Rub] : no pericardial rub [Edema] : there was no peripheral edema [Veins - Varicosity Changes] : there were no varicosital changes [Cervical Lymph Nodes Enlarged Posterior Bilaterally] : posterior cervical [Cervical Lymph Nodes Enlarged Anterior Bilaterally] : anterior cervical [Supraclavicular Lymph Nodes Enlarged Bilaterally] : supraclavicular [Abnormal Walk] : normal gait [Nail Clubbing] : no clubbing  or cyanosis of the fingernails [Musculoskeletal - Swelling] : no joint swelling seen [Motor Tone] : muscle strength and tone were normal [Skin Color & Pigmentation] : normal skin color and pigmentation [Skin Turgor] : normal skin turgor [] : no rash [Sensation] : the sensory exam was normal to light touch and pinprick [Motor Exam] : the motor exam was normal [Oriented To Time, Place, And Person] : oriented to person, place, and time [Impaired Insight] : insight and judgment were intact [Affect] : the affect was normal [FreeTextEntry1] : admits to anxiety & depression both well controlled at this time,

## 2023-08-26 ENCOUNTER — RX RENEWAL (OUTPATIENT)
Age: 33
End: 2023-08-26

## 2023-08-28 ENCOUNTER — RX RENEWAL (OUTPATIENT)
Age: 33
End: 2023-08-28

## 2023-08-31 ENCOUNTER — RX RENEWAL (OUTPATIENT)
Age: 33
End: 2023-08-31

## 2023-08-31 ENCOUNTER — TRANSCRIPTION ENCOUNTER (OUTPATIENT)
Age: 33
End: 2023-08-31

## 2023-09-02 ENCOUNTER — RX RENEWAL (OUTPATIENT)
Age: 33
End: 2023-09-02

## 2023-09-05 ENCOUNTER — OFFICE (OUTPATIENT)
Dept: URBAN - METROPOLITAN AREA CLINIC 102 | Facility: CLINIC | Age: 33
Setting detail: OPHTHALMOLOGY
End: 2023-09-05
Payer: COMMERCIAL

## 2023-09-05 DIAGNOSIS — M35.9: ICD-10-CM

## 2023-09-05 DIAGNOSIS — H10.33: ICD-10-CM

## 2023-09-05 DIAGNOSIS — Z79.891: ICD-10-CM

## 2023-09-05 DIAGNOSIS — H20.00: ICD-10-CM

## 2023-09-05 PROCEDURE — 92012 INTRM OPH EXAM EST PATIENT: CPT | Performed by: OPHTHALMOLOGY

## 2023-09-05 ASSESSMENT — KERATOMETRY
METHOD_AUTO_MANUAL: AUTO
OD_K1POWER_DIOPTERS: 42.25
OD_AXISANGLE_DEGREES: 094
OS_K1POWER_DIOPTERS: 42.25
OS_AXISANGLE_DEGREES: 069
OD_K2POWER_DIOPTERS: 43.50
OS_K2POWER_DIOPTERS: 43.50

## 2023-09-05 ASSESSMENT — VISUAL ACUITY
OD_BCVA: 20/20
OS_BCVA: 20/20

## 2023-09-05 ASSESSMENT — REFRACTION_AUTOREFRACTION
OD_SPHERE: -0.50
OD_CYLINDER: -0.25
OS_AXIS: 144
OD_AXIS: 159
OS_CYLINDER: -0.25
OS_SPHERE: -0.25

## 2023-09-05 ASSESSMENT — AXIALLENGTH_DERIVED
OS_AL: 23.9737
OD_AL: 24.0747

## 2023-09-05 ASSESSMENT — CONFRONTATIONAL VISUAL FIELD TEST (CVF)
OD_FINDINGS: FULL
OS_FINDINGS: FULL

## 2023-09-05 ASSESSMENT — SPHEQUIV_DERIVED
OD_SPHEQUIV: -0.625
OS_SPHEQUIV: -0.375

## 2023-09-05 ASSESSMENT — TONOMETRY
OD_IOP_MMHG: 12
OS_IOP_MMHG: 13

## 2023-09-07 ENCOUNTER — RX RENEWAL (OUTPATIENT)
Age: 33
End: 2023-09-07

## 2023-09-12 ENCOUNTER — OFFICE (OUTPATIENT)
Dept: URBAN - METROPOLITAN AREA CLINIC 102 | Facility: CLINIC | Age: 33
Setting detail: OPHTHALMOLOGY
End: 2023-09-12
Payer: COMMERCIAL

## 2023-09-12 DIAGNOSIS — H10.33: ICD-10-CM

## 2023-09-12 DIAGNOSIS — Z79.891: ICD-10-CM

## 2023-09-12 DIAGNOSIS — M35.9: ICD-10-CM

## 2023-09-12 DIAGNOSIS — H20.00: ICD-10-CM

## 2023-09-12 PROCEDURE — 92012 INTRM OPH EXAM EST PATIENT: CPT | Performed by: OPHTHALMOLOGY

## 2023-09-12 ASSESSMENT — REFRACTION_AUTOREFRACTION
OS_CYLINDER: -0.75
OD_CYLINDER: -0.25
OS_SPHERE: -0.25
OD_SPHERE: -0.75
OD_AXIS: 010
OS_AXIS: 161

## 2023-09-12 ASSESSMENT — KERATOMETRY
OD_AXISANGLE_DEGREES: 095
OS_AXISANGLE_DEGREES: 075
OD_K1POWER_DIOPTERS: 42.75
OS_K1POWER_DIOPTERS: 42.50
METHOD_AUTO_MANUAL: AUTO
OS_K2POWER_DIOPTERS: 43.75
OD_K2POWER_DIOPTERS: 43.75

## 2023-09-12 ASSESSMENT — TEAR BREAK UP TIME (TBUT)
OS_TBUT: 2+
OD_TBUT: 2+

## 2023-09-12 ASSESSMENT — TONOMETRY
OS_IOP_MMHG: 13
OD_IOP_MMHG: 13

## 2023-09-12 ASSESSMENT — SUPERFICIAL PUNCTATE KERATITIS (SPK)
OS_SPK: T 1+
OD_SPK: T 1+

## 2023-09-12 ASSESSMENT — AXIALLENGTH_DERIVED
OD_AL: 24.0327
OS_AL: 23.9794

## 2023-09-12 ASSESSMENT — SPHEQUIV_DERIVED
OD_SPHEQUIV: -0.875
OS_SPHEQUIV: -0.625

## 2023-09-12 ASSESSMENT — CONFRONTATIONAL VISUAL FIELD TEST (CVF)
OS_FINDINGS: FULL
OD_FINDINGS: FULL

## 2023-09-12 ASSESSMENT — VISUAL ACUITY
OS_BCVA: 20/30
OD_BCVA: 20/30

## 2023-09-21 ENCOUNTER — APPOINTMENT (OUTPATIENT)
Dept: DERMATOLOGY | Facility: CLINIC | Age: 33
End: 2023-09-21
Payer: COMMERCIAL

## 2023-09-21 PROCEDURE — 17106 DSTR CUT VSC PRLF LES<10SQCM: CPT | Mod: 79

## 2023-09-24 ENCOUNTER — TRANSCRIPTION ENCOUNTER (OUTPATIENT)
Age: 33
End: 2023-09-24

## 2023-09-29 ENCOUNTER — OFFICE (OUTPATIENT)
Dept: URBAN - METROPOLITAN AREA CLINIC 102 | Facility: CLINIC | Age: 33
Setting detail: OPHTHALMOLOGY
End: 2023-09-29
Payer: COMMERCIAL

## 2023-09-29 DIAGNOSIS — H20.00: ICD-10-CM

## 2023-09-29 DIAGNOSIS — M35.9: ICD-10-CM

## 2023-09-29 DIAGNOSIS — Z79.891: ICD-10-CM

## 2023-09-29 DIAGNOSIS — H10.33: ICD-10-CM

## 2023-09-29 PROCEDURE — 92012 INTRM OPH EXAM EST PATIENT: CPT | Performed by: OPHTHALMOLOGY

## 2023-09-29 ASSESSMENT — KERATOMETRY
OD_AXISANGLE_DEGREES: 090
OS_K2POWER_DIOPTERS: 43.50
OD_K2POWER_DIOPTERS: 43.75
OS_K1POWER_DIOPTERS: 42.75
OS_AXISANGLE_DEGREES: 075
OD_K1POWER_DIOPTERS: 42.75
METHOD_AUTO_MANUAL: AUTO

## 2023-09-29 ASSESSMENT — SPHEQUIV_DERIVED
OS_SPHEQUIV: -0.75
OD_SPHEQUIV: -1.125

## 2023-09-29 ASSESSMENT — REFRACTION_AUTOREFRACTION
OS_SPHERE: -0.50
OS_AXIS: 167
OD_AXIS: 176
OS_CYLINDER: -0.50
OD_SPHERE: -1.00
OD_CYLINDER: -0.25

## 2023-09-29 ASSESSMENT — VISUAL ACUITY
OS_BCVA: 20/30-
OD_BCVA: 20/30

## 2023-09-29 ASSESSMENT — CONFRONTATIONAL VISUAL FIELD TEST (CVF)
OD_FINDINGS: FULL
OS_FINDINGS: FULL

## 2023-09-29 ASSESSMENT — TONOMETRY: OD_IOP_MMHG: 11

## 2023-09-29 ASSESSMENT — TEAR BREAK UP TIME (TBUT)
OD_TBUT: 2+
OS_TBUT: 2+

## 2023-09-29 ASSESSMENT — AXIALLENGTH_DERIVED
OD_AL: 24.1342
OS_AL: 24.0298

## 2023-10-06 ENCOUNTER — OFFICE (OUTPATIENT)
Dept: URBAN - METROPOLITAN AREA CLINIC 109 | Facility: CLINIC | Age: 33
Setting detail: OPHTHALMOLOGY
End: 2023-10-06
Payer: COMMERCIAL

## 2023-10-06 DIAGNOSIS — H20.00: ICD-10-CM

## 2023-10-06 PROBLEM — H16.223 DRY EYE SYNDROME K SICCA; BOTH EYES: Status: ACTIVE | Noted: 2023-09-12

## 2023-10-06 PROBLEM — H10.33 CONJUNCTIVITS ACUTE UNSPECIFIED; BOTH EYES: Status: ACTIVE | Noted: 2023-09-05

## 2023-10-06 PROCEDURE — 99213 OFFICE O/P EST LOW 20 MIN: CPT | Performed by: OPHTHALMOLOGY

## 2023-10-06 ASSESSMENT — VISUAL ACUITY
OS_BCVA: 20/20-
OD_BCVA: 20/20-

## 2023-10-06 ASSESSMENT — KERATOMETRY
OS_K1POWER_DIOPTERS: 42.75
OD_K2POWER_DIOPTERS: 43.75
OD_AXISANGLE_DEGREES: 090
METHOD_AUTO_MANUAL: AUTO
OS_K2POWER_DIOPTERS: 43.50
OD_K1POWER_DIOPTERS: 42.75
OS_AXISANGLE_DEGREES: 075

## 2023-10-06 ASSESSMENT — TEAR BREAK UP TIME (TBUT)
OD_TBUT: 2+
OS_TBUT: 2+

## 2023-10-06 ASSESSMENT — SPHEQUIV_DERIVED
OS_SPHEQUIV: -0.75
OD_SPHEQUIV: -1.125

## 2023-10-06 ASSESSMENT — REFRACTION_AUTOREFRACTION
OS_SPHERE: -0.50
OD_AXIS: 176
OD_SPHERE: -1.00
OD_CYLINDER: -0.25
OS_AXIS: 167
OS_CYLINDER: -0.50

## 2023-10-06 ASSESSMENT — AXIALLENGTH_DERIVED
OS_AL: 24.0298
OD_AL: 24.1342

## 2023-10-06 ASSESSMENT — CONFRONTATIONAL VISUAL FIELD TEST (CVF)
OD_FINDINGS: FULL
OS_FINDINGS: FULL

## 2023-10-06 ASSESSMENT — TONOMETRY
OD_IOP_MMHG: 16
OS_IOP_MMHG: 16

## 2023-10-08 ENCOUNTER — RX RENEWAL (OUTPATIENT)
Age: 33
End: 2023-10-08

## 2023-10-13 ENCOUNTER — APPOINTMENT (OUTPATIENT)
Dept: PULMONOLOGY | Facility: CLINIC | Age: 33
End: 2023-10-13
Payer: COMMERCIAL

## 2023-10-13 VITALS
OXYGEN SATURATION: 91 % | RESPIRATION RATE: 16 BRPM | HEART RATE: 65 BPM | DIASTOLIC BLOOD PRESSURE: 80 MMHG | SYSTOLIC BLOOD PRESSURE: 110 MMHG

## 2023-10-13 VITALS — HEIGHT: 62 IN | WEIGHT: 155 LBS | BODY MASS INDEX: 28.52 KG/M2

## 2023-10-13 DIAGNOSIS — R91.8 OTHER NONSPECIFIC ABNORMAL FINDING OF LUNG FIELD: ICD-10-CM

## 2023-10-13 PROCEDURE — 99214 OFFICE O/P EST MOD 30 MIN: CPT

## 2023-10-18 ENCOUNTER — TRANSCRIPTION ENCOUNTER (OUTPATIENT)
Age: 33
End: 2023-10-18

## 2023-10-20 ENCOUNTER — APPOINTMENT (OUTPATIENT)
Dept: PULMONOLOGY | Facility: CLINIC | Age: 33
End: 2023-10-20
Payer: COMMERCIAL

## 2023-10-20 VITALS — WEIGHT: 153 LBS | BODY MASS INDEX: 28.89 KG/M2 | HEIGHT: 61 IN

## 2023-10-20 PROCEDURE — 94010 BREATHING CAPACITY TEST: CPT

## 2023-10-27 ENCOUNTER — APPOINTMENT (OUTPATIENT)
Dept: CT IMAGING | Facility: CLINIC | Age: 33
End: 2023-10-27
Payer: COMMERCIAL

## 2023-10-27 PROCEDURE — 71250 CT THORAX DX C-: CPT

## 2023-11-02 DIAGNOSIS — Z11.1 ENCOUNTER FOR SCREENING FOR RESPIRATORY TUBERCULOSIS: ICD-10-CM

## 2023-11-03 ENCOUNTER — TRANSCRIPTION ENCOUNTER (OUTPATIENT)
Age: 33
End: 2023-11-03

## 2023-11-03 ENCOUNTER — APPOINTMENT (OUTPATIENT)
Dept: RHEUMATOLOGY | Facility: CLINIC | Age: 33
End: 2023-11-03

## 2023-11-07 ENCOUNTER — APPOINTMENT (OUTPATIENT)
Dept: DERMATOLOGY | Facility: CLINIC | Age: 33
End: 2023-11-07
Payer: COMMERCIAL

## 2023-11-07 PROCEDURE — 17106 DSTR CUT VSC PRLF LES<10SQCM: CPT | Mod: 58

## 2023-11-10 ENCOUNTER — APPOINTMENT (OUTPATIENT)
Dept: INTERNAL MEDICINE | Facility: CLINIC | Age: 33
End: 2023-11-10
Payer: COMMERCIAL

## 2023-11-10 VITALS
OXYGEN SATURATION: 100 % | DIASTOLIC BLOOD PRESSURE: 88 MMHG | BODY MASS INDEX: 27.38 KG/M2 | SYSTOLIC BLOOD PRESSURE: 128 MMHG | WEIGHT: 145 LBS | HEIGHT: 61 IN | RESPIRATION RATE: 18 BRPM | HEART RATE: 97 BPM | TEMPERATURE: 98.6 F

## 2023-11-10 DIAGNOSIS — Z11.1 ENCOUNTER FOR SCREENING FOR RESPIRATORY TUBERCULOSIS: ICD-10-CM

## 2023-11-10 DIAGNOSIS — Z23 ENCOUNTER FOR IMMUNIZATION: ICD-10-CM

## 2023-11-10 PROCEDURE — 90662 IIV NO PRSV INCREASED AG IM: CPT

## 2023-11-10 PROCEDURE — 99213 OFFICE O/P EST LOW 20 MIN: CPT | Mod: 25

## 2023-11-10 PROCEDURE — G0008: CPT

## 2023-11-12 LAB
M TB IFN-G BLD-IMP: NEGATIVE
QUANTIFERON TB PLUS MITOGEN MINUS NIL: 3.47 IU/ML
QUANTIFERON TB PLUS NIL: 0.02 IU/ML
QUANTIFERON TB PLUS TB1 MINUS NIL: -0.01 IU/ML
QUANTIFERON TB PLUS TB2 MINUS NIL: -0.01 IU/ML

## 2023-12-05 ENCOUNTER — RX RENEWAL (OUTPATIENT)
Age: 33
End: 2023-12-05

## 2023-12-05 RX ORDER — AMITRIPTYLINE HYDROCHLORIDE 10 MG/1
10 TABLET, FILM COATED ORAL
Qty: 30 | Refills: 3 | Status: ACTIVE | COMMUNITY
Start: 2023-08-11 | End: 1900-01-01

## 2023-12-11 ENCOUNTER — RX RENEWAL (OUTPATIENT)
Age: 33
End: 2023-12-11

## 2024-01-02 ENCOUNTER — RX RENEWAL (OUTPATIENT)
Age: 34
End: 2024-01-02

## 2024-01-15 ENCOUNTER — RX RENEWAL (OUTPATIENT)
Age: 34
End: 2024-01-15

## 2024-01-15 RX ORDER — OMEPRAZOLE 20 MG/1
20 CAPSULE, DELAYED RELEASE ORAL DAILY
Qty: 90 | Refills: 3 | Status: ACTIVE | COMMUNITY
Start: 2022-11-30 | End: 1900-01-01

## 2024-01-21 ENCOUNTER — RX RENEWAL (OUTPATIENT)
Age: 34
End: 2024-01-21

## 2024-01-21 RX ORDER — FAMOTIDINE 40 MG/1
40 TABLET, FILM COATED ORAL
Qty: 90 | Refills: 1 | Status: ACTIVE | COMMUNITY
Start: 2021-12-22 | End: 1900-01-01

## 2024-02-16 ENCOUNTER — NON-APPOINTMENT (OUTPATIENT)
Age: 34
End: 2024-02-16

## 2024-02-17 ENCOUNTER — LABORATORY RESULT (OUTPATIENT)
Age: 34
End: 2024-02-17

## 2024-02-20 ENCOUNTER — OFFICE (OUTPATIENT)
Dept: URBAN - METROPOLITAN AREA CLINIC 102 | Facility: CLINIC | Age: 34
Setting detail: OPHTHALMOLOGY
End: 2024-02-20
Payer: COMMERCIAL

## 2024-02-20 DIAGNOSIS — M35.9: ICD-10-CM

## 2024-02-20 DIAGNOSIS — H20.00: ICD-10-CM

## 2024-02-20 DIAGNOSIS — Z79.891: ICD-10-CM

## 2024-02-20 PROCEDURE — 92014 COMPRE OPH EXAM EST PT 1/>: CPT | Performed by: OPHTHALMOLOGY

## 2024-02-20 PROCEDURE — 92083 EXTENDED VISUAL FIELD XM: CPT | Performed by: OPHTHALMOLOGY

## 2024-02-20 PROCEDURE — 92134 CPTRZ OPH DX IMG PST SGM RTA: CPT | Performed by: OPHTHALMOLOGY

## 2024-02-20 ASSESSMENT — TEAR BREAK UP TIME (TBUT)
OS_TBUT: 2+
OD_TBUT: 2+

## 2024-02-20 ASSESSMENT — REFRACTION_AUTOREFRACTION
OS_CYLINDER: -0.50
OD_AXIS: 169
OS_AXIS: 158
OD_SPHERE: -0.25
OS_SPHERE: PLANO
OD_CYLINDER: -0.50

## 2024-02-20 ASSESSMENT — CONFRONTATIONAL VISUAL FIELD TEST (CVF)
OD_FINDINGS: FULL
OS_FINDINGS: FULL

## 2024-02-20 ASSESSMENT — SPHEQUIV_DERIVED: OD_SPHEQUIV: -0.5

## 2024-02-21 ENCOUNTER — TRANSCRIPTION ENCOUNTER (OUTPATIENT)
Age: 34
End: 2024-02-21

## 2024-02-21 ENCOUNTER — APPOINTMENT (OUTPATIENT)
Dept: RHEUMATOLOGY | Facility: CLINIC | Age: 34
End: 2024-02-21
Payer: COMMERCIAL

## 2024-02-21 VITALS
SYSTOLIC BLOOD PRESSURE: 128 MMHG | OXYGEN SATURATION: 98 % | HEART RATE: 88 BPM | HEIGHT: 61 IN | WEIGHT: 154 LBS | TEMPERATURE: 97.2 F | DIASTOLIC BLOOD PRESSURE: 86 MMHG | BODY MASS INDEX: 29.07 KG/M2

## 2024-02-21 DIAGNOSIS — I73.00 RAYNAUD'S SYNDROME W/OUT GANGRENE: ICD-10-CM

## 2024-02-21 DIAGNOSIS — Z79.899 OTHER LONG TERM (CURRENT) DRUG THERAPY: ICD-10-CM

## 2024-02-21 DIAGNOSIS — M16.9 OSTEOARTHRITIS OF HIP, UNSPECIFIED: ICD-10-CM

## 2024-02-21 PROCEDURE — 99214 OFFICE O/P EST MOD 30 MIN: CPT

## 2024-02-21 PROCEDURE — G2211 COMPLEX E/M VISIT ADD ON: CPT

## 2024-02-22 ENCOUNTER — TRANSCRIPTION ENCOUNTER (OUTPATIENT)
Age: 34
End: 2024-02-22

## 2024-02-23 LAB
ALBUMIN SERPL ELPH-MCNC: 4.7 G/DL
ALP BLD-CCNC: 91 U/L
ALT SERPL-CCNC: 26 U/L
ANION GAP SERPL CALC-SCNC: 12 MMOL/L
APPEARANCE: CLEAR
AST SERPL-CCNC: 30 U/L
BILIRUB SERPL-MCNC: 0.2 MG/DL
BILIRUBIN URINE: NEGATIVE
BLOOD URINE: NEGATIVE
BUN SERPL-MCNC: 10 MG/DL
C3 SERPL-MCNC: 161 MG/DL
C4 SERPL-MCNC: 26 MG/DL
CALCIUM SERPL-MCNC: 9.4 MG/DL
CHLORIDE SERPL-SCNC: 103 MMOL/L
CK SERPL-CCNC: 117 U/L
CO2 SERPL-SCNC: 24 MMOL/L
COLOR: YELLOW
CREAT SERPL-MCNC: 0.6 MG/DL
CREAT SPEC-SCNC: 11 MG/DL
CREAT/PROT UR: NORMAL RATIO
CRP SERPL-MCNC: 6 MG/L
DSDNA AB SER-ACNC: <12 IU/ML
EGFR: 121 ML/MIN/1.73M2
ERYTHROCYTE [SEDIMENTATION RATE] IN BLOOD BY WESTERGREN METHOD: 39 MM/HR
GLUCOSE QUALITATIVE U: NEGATIVE MG/DL
GLUCOSE SERPL-MCNC: 96 MG/DL
KETONES URINE: NEGATIVE MG/DL
LEUKOCYTE ESTERASE URINE: ABNORMAL
NITRITE URINE: NEGATIVE
PH URINE: 7
POTASSIUM SERPL-SCNC: 4.2 MMOL/L
PROT SERPL-MCNC: 7.8 G/DL
PROT UR-MCNC: <4 MG/DL
PROTEIN URINE: NEGATIVE MG/DL
SODIUM SERPL-SCNC: 139 MMOL/L
SPECIFIC GRAVITY URINE: 1
TSH SERPL-ACNC: 1.99 UIU/ML
UROBILINOGEN URINE: 0.2 MG/DL

## 2024-02-25 ENCOUNTER — NON-APPOINTMENT (OUTPATIENT)
Age: 34
End: 2024-02-25

## 2024-02-26 ENCOUNTER — RX RENEWAL (OUTPATIENT)
Age: 34
End: 2024-02-26

## 2024-02-26 RX ORDER — MYCOPHENOLATE MOFETIL 500 MG/1
500 TABLET ORAL TWICE DAILY
Qty: 360 | Refills: 1 | Status: ACTIVE | COMMUNITY
Start: 2022-03-25 | End: 1900-01-01

## 2024-03-01 NOTE — PHYSICAL EXAM
[No CVA Tenderness] : no ~M costovertebral angle tenderness [No Spinal Tenderness] : no spinal tenderness [FreeTextEntry1] : admits to anxiety & depression both well controlled at this time,

## 2024-03-01 NOTE — ASSESSMENT
[FreeTextEntry1] : 35 yo wf with long hx Limited SSc, MCTD + RNP w/  advanced DJD b/l hips ? AVN (not clearly defined), advanced raynauds, telangiectasias presents for routine follow-up  1)  MCTD/Limited Scleroderma:  SHERI 1:2560S, + RNP > 8.0, SM 3.8, + RF 45 (neg CCP) w/ CK > 200 (intermittently) but no overt weakness now or in past.   All other serologies to include APS neg w/ nl C3/4, dsDNA, SSA/B  Primary sx:  Raynauds, sclerodactyly, telangiectasias, mild sicca/ keratoconjunctivitis, NEW onset calcinosis palmar surface both hands and heels of both feet.. 12/21.. in addition to low grade fevers, arthralgias / myalgias and fatigue when "flaring". Added MMF 3 gm daily initially (well tolerated )  w/ HCQ (200 mg= 2.85mg/kg @ 70kg) with routine opth exam, last Oph 2/20/24 stable.  Pulm function improved, calcinosis resolved.. and lowered MMF to 2 gm  3/22- and continues to  remain stable.  No need to adjust tx, also note less frequent infections.  When doesn't have URI feels well exercises without limitation, since last visit and no recent URI (on lower dose MMF)  Concerned about immunosuppression- with routine exposure to young children... will continue w/ nasal steroids but wonder if anything else should be considered. Doing well now, no further change in tx needed.. but in future may consider ?? montelukast.. really can't lower DMARDs... is also trying to make changes at work..to decrease exposure to infections from v. young kids. Summary Status by symptom: - Raynaud's moderate to severe at times, tried and failed to tolerate nifedipine/ amlodipine now on felodipine at 10 mg and doing well now. Noted digital pitting but no report of active ulcerations -.. nothing for past few ys.. .    - Sclerodactyly can't really say if any better but definitely not worse. ROM limited by tightness but  str good and functionally without limitations at this point. No recommendation for changes to tx at this time. - GERD (presumed esophageal dysmotility) severe at times w/ dx H Pylori 12/20 completed triple tx, not well tolerated, now sx better on daily PPI 20 mg and H2 40 mg w/ prn sulcrafate (was NOT effective). Working w/ Dr Red Last GI visit 06/2023 updated EDG not recommended. F/u scheduled for 06/2024. Omeprazole 20mg PRN tapering use and 40mg famotidine nightly. - Telangiectasias/ Calcinosis:  No limitations in daily activity no additional recommendations at this time ----Moderate to severe over face, chest s/p laser tx in past and again working with Dr. Rutherford now.... pleased with outcome.  Calcinosis noted 12/21- stable not painful.. if progresses b/l hands and now noted on heels of both feet.. working with podiatry q 6 m reduced through scraping.. will refer to OT for hand tx as needed (not necessary at this time.. though would be helpful given sclerodactyly -not necessary at this point) - Sclerodactyly: Moderate in limited pattern, distal to PIPs and around mouth ..stable x many ys and hands look sl better. No recommendations for changes to treatment at this time. -Cardiopulmonary:  Routine PFTs w/ DLCO 79-> 74-> 68 (2018)-> 70 (2019)-> 58 (3/21)-> 68 (12/22) w/ volumes relatively well preserved over time... no reported hx of pleuropericardial effusion and completely denies SOB/JENSEN  cough or change in activity tolerance (exercises routinely).  updated CTScan 9/22-> 10/23 actually improved with mild- moderate resolution of GGO now on MMF..stable overall  Last Echo 08/23: thickening of the anterior mitral leaflet especially at the tip, trace-mild mitral insufficiency. Last 10/23 PFT DLCO not reported f/u pulm scheduled 04/24 - Serositis: elevated LFTs to 900 in past x 1 but repeat studies nl as of 02/24, etiology unclear. No renal dysfunction, no pleural/pericardial effusions.    Proteinuria: 02/24 no proteinuria. Intermittently + 200-400mg currently controlled - Myopathy:  mildly elevated CK max 259 7/20... since 109-217 w/ no previous weakness now or in past still nl NOTE:  3/21 Myomarker 3 NEG 2019 mild proteinuria PCR 0.4-> nl now  neg REGGIE, Scl70, RNA polymerase, ANCA  - 2017 adm to [place] w/ PNA + response to antibiotics w/ + response immediately - 2015/2016 she went off Plaquenil but restarted it because of the Raynaud's/digital pits, restarted and sx stable  2) Sleep: much improved following trail amitriptyline 10mg has since discontinued since the holidays 2023 but continues to maintain improved sleep, in both quality and duration. No recommendation to resume maintenance amitriptyline at this time, can use ambien 2.5mg PRN.  3) Anxiety- stable on sertraline 50mg daily, followed by MH counselor. No recommendation for changes to tx at this time.  4)  Hypothyroidism on replacement w/ nl TSH 02/24  5) Family planning: Oral contraceptive use: changed brands to a low estrogen containing pill- currently off, not in relationship, denies sexual activity.  Discussed current reproductive guidelines .... and is interested in pregnancy in next few ys. Discussed use of MMF and need to stop 6-8 wks (at minimum) before conception... understood clearly, will contact - unchanged today -No updates on family planning, patient is aware teratogenic effects MMF, need to notify provider if actively pursuing family   6)  OA hips: primary vs. secondary. ? AVN (need updated eval). bilateral R> L w/ limited ROM and daily pain worse at times, doing well now. No change in intermitted hip and improvement in limited ROM R>L continues to work closely with /PT student. No additional recommendation for imaging or tx at this time. Xray Hips:  moderate OA, etiology NOTE:  Tx  acupuncture and PT w/ limited benefit   Updated vaccines: - COVID 12/23  Plan:  - Continue on 2.5 mg of ambien can go up to 10 mg PRN for sleep disruption when sick  -Remain off amitriptyline will revisit if recurrence sleep disturbances   - Updated COVID, still no Shingrix???  - complete labs before next visit   - Follow-up specialists as scheduled gastro (Dr. Red), pulm (Dr. Mei), cardiology (Dr. Jeffrey Pate [phone]) ....at least annually  - No urogyn referral needed at this time but will continue to reassess  - Continue HCQ... 200 mg routinely... eye exam q 6m  - Recommended stopping MMF 6 m prior to conception only.. then assess for disease stability.   - Continue felodipine 10 mg, call immediately if any ulcerations present    - continue to work with derm  - Is aware to call if there is any change in her underlying symptoms.  -Will task pulm regarding DLCO for rheum note update ILD status....asymptomatic and previously stable PFTs  - Keep using mometasone nasal spray through winter months   - RTO 6 m  Patient was seen and evaluated by Joann Leyva DNP (training in rheumatology) and with WILNER Kendall I agree with full evaluation and plan as described above.

## 2024-03-01 NOTE — HISTORY OF PRESENT ILLNESS
[FreeTextEntry1] : 2/21/2024 35 yo wf with long hx Limited SSc, MCTD + RNP w/  advanced DJD b/l hips ? AVN, advanced raynauds, telangiectasias presents for routine follow-up  CC: Intermittent joint pain in b/l hips and feet -Working with /PT student  Last labs 02/17/24: CRP 6, ER 39 neg/nl: UA, CBC, CMP, C3/C4, Pr/Cr, TSH, CK, DsDNA  Current tx: Currently MMF 1000mg BID and continues w/ HCQ (200 mg= 2.85 mg/kg @ 70kg) Last Oph 2/20/24 stable   Sx: -RP: Currently taking felodipine 10mg with good control GERD: Controlled on 20mg omeprazole PRN and 40mg famotidine nightly has not had repeat EGD. Now trying to taper off regular omeprazole. Last GI visit 06/2023 updated EDG not recommended. F/u scheduled for 06/2024.  Working with Brianne Red Sclerodactyly:  moderate and limited pattern distal to PIPs and around mouth -STABLE Cardiopulmonary: last Echo 08/2023, Last PFT/CT 10/23 f/u appt scheduled 04/2024 Serositis: nl LFTs 02/24. No renal dysfunction, no pleural/pericardial effusions. No proteinuria 02/24 Myopathy: nl CK 02/24 w/o weakness  Sleep/Mood -Started amitriptyline last visit took it until the holidays and discontinued and sleeping much better. 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 would like change) ?? did she find anything new.. any infections since last visit?     1.  MCTD/Limited Scleroderma:  consisting of Raynaud's, GERD (presumed esophageal dysmotility), telangiectases, sclerodactyly, RNP Ab.   2.  GERD. Pt on ranitidine. GERD symptoms on and off. No dysphagia.  3.  Hypothyroidism 4.  Myopathy: mild elevation of CKs on a couple of occasions 5.  Plaquenil use:  no ocular symptoms.  6.  Oral contraceptive use: changed brands to a low estrogen containing pill. 7.  Raynauds:  more problematic in the hands than feet. with pitting  .  9.  Avascular necrosis of hips:   bilateral.  moderate to severe R > L w/ advanced DJD   An x-ray was read as moderate OA of the hip (acetabular sclerosis and osteophytes).   10. PNa 2017  Antibiotics were administered, and she improved. 12.  Resting tachycardia: no dyspnea or chest pain

## 2024-03-01 NOTE — DATA REVIEWED
[FreeTextEntry1] : Labs:  12/30/2022 nl CBC plt 438, ESR 20, CMP, CK, CRP, C3/4, UA  9/29/2022 nl CBC, CMP, ESR, C3, C4, CRP  3/21 ESr 22 Myomarker 3 +   nl cmp, CBC ck, CRP, C3/4, w/ neg dsDNA, LAC/ APS, PCR                 9/18/18 8/14/19 2021 1/14/22 12/29/22 PFTs   FVC         88->             90->        85->    88->          94 TLC          98->             84->        80-)    86->.         84 DLCO       68->             70->        58-)    68->          70  CTScan 3/22 stable GGO still present, NSIP pattern c/w CTD, no lymphadenopathy  CTScan chest 3/22/21 peripheral nodular ggo LLs w/ no lymphadenopathy  Echo:  10/16/20 nl w/ no mention PAH.. but RA/RV nl in size  Xray b/l hips 3/21 symmetric djd spurring b/l  along articular margins without  gross JS loss without obvious evidence of AVN  - similar to 2018 study with moderate DJD L / R JSN with acetabular sclerosis and osteophyte formation 2014

## 2024-03-01 NOTE — REVIEW OF SYSTEMS
[As Noted in HPI] : as noted in HPI [Joint Pain] : joint pain [FreeTextEntry2] : stable wt battling recurrent viral infections - RESOLVED [de-identified] : laser tx for telangiectasias and digital pitting - nothing new  [FreeTextEntry7] : on 20 mg of omeprazole in the morning and 40 mg of famotidine at night for reflux which is controlled [de-identified] : improved

## 2024-03-10 ENCOUNTER — RX RENEWAL (OUTPATIENT)
Age: 34
End: 2024-03-10

## 2024-03-28 ENCOUNTER — APPOINTMENT (OUTPATIENT)
Dept: DERMATOLOGY | Facility: CLINIC | Age: 34
End: 2024-03-28
Payer: COMMERCIAL

## 2024-03-28 DIAGNOSIS — R00.0 TACHYCARDIA, UNSPECIFIED: ICD-10-CM

## 2024-03-28 PROCEDURE — 17106 DSTR CUT VSC PRLF LES<10SQCM: CPT

## 2024-04-01 ENCOUNTER — RX RENEWAL (OUTPATIENT)
Age: 34
End: 2024-04-01

## 2024-04-02 ENCOUNTER — RX RENEWAL (OUTPATIENT)
Age: 34
End: 2024-04-02

## 2024-04-03 ENCOUNTER — APPOINTMENT (OUTPATIENT)
Dept: PULMONOLOGY | Facility: CLINIC | Age: 34
End: 2024-04-03
Payer: COMMERCIAL

## 2024-04-03 VITALS — HEIGHT: 61 IN | WEIGHT: 156 LBS | BODY MASS INDEX: 29.45 KG/M2

## 2024-04-03 VITALS — DIASTOLIC BLOOD PRESSURE: 80 MMHG | RESPIRATION RATE: 16 BRPM | SYSTOLIC BLOOD PRESSURE: 128 MMHG

## 2024-04-03 DIAGNOSIS — J84.9 INTERSTITIAL PULMONARY DISEASE, UNSPECIFIED: ICD-10-CM

## 2024-04-03 PROCEDURE — 99213 OFFICE O/P EST LOW 20 MIN: CPT

## 2024-04-03 PROCEDURE — G2211 COMPLEX E/M VISIT ADD ON: CPT

## 2024-04-03 RX ORDER — ZOLPIDEM TARTRATE 10 MG/1
10 TABLET ORAL
Qty: 30 | Refills: 2 | Status: ACTIVE | COMMUNITY
Start: 2023-01-06 | End: 1900-01-01

## 2024-04-03 NOTE — PHYSICAL EXAM
[No Acute Distress] : no acute distress [Normal Appearance] : normal appearance [Normal Rate/Rhythm] : normal rate/rhythm [Normal S1, S2] : normal s1, s2 [No Murmurs] : no murmurs [No Gallops] : no gallops [No Rubs] : no rubs [No Resp Distress] : no resp distress [No Acc Muscle Use] : no acc muscle use [Clear to Auscultation Bilaterally] : clear to auscultation bilaterally [Normal Rhythm and Effort] : normal rhythm and effort [No Abnormalities] : no abnormalities [Normal Gait] : normal gait [Normal to Percussion] : normal to percussion [No Cyanosis] : no cyanosis [No Clubbing] : no clubbing [No Edema] : no edema [FROM] : FROM [No Focal Deficits] : no focal deficits [Normal Color/ Pigmentation] : normal color/ pigmentation [Normal Affect] : normal affect [Oriented x3] : oriented x3

## 2024-04-04 NOTE — DISCUSSION/SUMMARY
[FreeTextEntry1] : Mixed CT/Scleroderma, slowly progressive ILD, dilated esophagus At baseline, no pulmonary complaints, takes nocturnal precautions, has adjustable bed- GI input noted last echocardiogram without  any pulmonary hypertension -  Cardiology following, repeat echo done, await report Centrilobular GG nodules predominantly lung bases, stable current regimen 10/23-3/22-  also stable reticular scarring upper lobes Suspect combination of  esophageal reflux and Scleroderma related, no Environmental exposures Currently essentially asymptomatic, exercises regularly, hx Avascular necrosis  MMF 2 gms, has sulfa rash, discussed PJP prophylaxis with ILD specialist [place], agrees is controversial and she doesnt routinely use with cell cept OFEV if evidence of progressive fibrotic component, currently stable Recent PFTs 10/23 stable- Normal FVC , will repeat at follow up 6 months Will re evaluate 6 months or sooner if needed,  with [place] and re order CT scan at that time

## 2024-04-04 NOTE — CONSULT LETTER
[Dear  ___] : Dear  [unfilled], [Consult Letter:] : I had the pleasure of evaluating your patient, [unfilled]. [Please see my note below.] : Please see my note below. [Sincerely,] : Sincerely, [FreeTextEntry3] : Cj Mei DO [facility]\par  Pulmonary Critical Care\par  Director Pulmonary Division\par  Medical Director Respiratory Therapy\par  [facility]\par  \par

## 2024-04-04 NOTE — HISTORY OF PRESENT ILLNESS
[Never] : never [TextBox_4] :  MCTD/Limited Scleroderma: consisting of Raynaud's, GERD (presumed esophageal dysmotility), telangiectases, sclerodactyly, RNP Ab.  never smoker no active GERD- saw GI on Prilosec, Pepcid no sputum,  no fever, chill, chest pain Last echo 7/22 no sig PH Dr Pate Rheumatology on MMF 2 gms NP [place] at baseline, no acute pulmonary complaints  4/3/24 doing well no acute pulmonary complaints  omeprazole every other day, Pepcid q HS no fever, chill, chest pain dyspnea at baseline has nasal congestion, doesnt feel sick

## 2024-04-24 ENCOUNTER — APPOINTMENT (OUTPATIENT)
Dept: INTERNAL MEDICINE | Facility: CLINIC | Age: 34
End: 2024-04-24
Payer: COMMERCIAL

## 2024-04-24 VITALS
WEIGHT: 156 LBS | OXYGEN SATURATION: 96 % | DIASTOLIC BLOOD PRESSURE: 84 MMHG | HEART RATE: 68 BPM | BODY MASS INDEX: 29.45 KG/M2 | RESPIRATION RATE: 16 BRPM | HEIGHT: 61 IN | SYSTOLIC BLOOD PRESSURE: 136 MMHG | TEMPERATURE: 98 F

## 2024-04-24 DIAGNOSIS — Z00.00 ENCOUNTER FOR GENERAL ADULT MEDICAL EXAMINATION W/OUT ABNORMAL FINDINGS: ICD-10-CM

## 2024-04-24 DIAGNOSIS — E03.9 HYPOTHYROIDISM, UNSPECIFIED: ICD-10-CM

## 2024-04-24 DIAGNOSIS — R79.89 OTHER SPECIFIED ABNORMAL FINDINGS OF BLOOD CHEMISTRY: ICD-10-CM

## 2024-04-24 DIAGNOSIS — F41.9 ANXIETY DISORDER, UNSPECIFIED: ICD-10-CM

## 2024-04-24 PROCEDURE — 99395 PREV VISIT EST AGE 18-39: CPT | Mod: 25

## 2024-04-24 NOTE — HISTORY OF PRESENT ILLNESS
[de-identified] : Pt presents to the office today for CPE and FBW Pt has been feeling well overall. Healthy diet and exercise with .

## 2024-04-24 NOTE — HEALTH RISK ASSESSMENT
[Good] : ~his/her~  mood as  good [No falls in past year] : Patient reported no falls in the past year [Little interest or pleasure doing things] : 1) Little interest or pleasure doing things [Feeling down, depressed, or hopeless] : 2) Feeling down, depressed, or hopeless [0] : 2) Feeling down, depressed, or hopeless: Not at all (0) [PHQ-2 Negative - No further assessment needed] : PHQ-2 Negative - No further assessment needed [[id number]] : 0 [Change in mental status noted] : No change in mental status noted [Language] : denies difficulty with language [Behavior] : denies difficulty with behavior [None] : None [Fully functional (bathing, dressing, toileting, transferring, walking, feeding)] : Fully functional (bathing, dressing, toileting, transferring, walking, feeding) [Fully functional (using the telephone, shopping, preparing meals, housekeeping, doing laundry, using] : Fully functional and needs no help or supervision to perform IADLs (using the telephone, shopping, preparing meals, housekeeping, doing laundry, using transportation, managing medications and managing finances)

## 2024-04-24 NOTE — PLAN
[FreeTextEntry1] : Pt will continue with specialist follow ups. Reviewed prior consults. FBW done in office today. Continue with current medications diet and exercise.

## 2024-04-28 ENCOUNTER — TRANSCRIPTION ENCOUNTER (OUTPATIENT)
Age: 34
End: 2024-04-28

## 2024-04-28 LAB
ALBUMIN SERPL ELPH-MCNC: 5 G/DL
ALP BLD-CCNC: 94 U/L
ALT SERPL-CCNC: 21 U/L
ANION GAP SERPL CALC-SCNC: 13 MMOL/L
APPEARANCE: CLEAR
AST SERPL-CCNC: 30 U/L
BACTERIA: NEGATIVE /HPF
BASOPHILS # BLD AUTO: 0.03 K/UL
BASOPHILS NFR BLD AUTO: 0.5 %
BILIRUB SERPL-MCNC: 0.3 MG/DL
BILIRUBIN URINE: NEGATIVE
BLOOD URINE: NEGATIVE
BUN SERPL-MCNC: 12 MG/DL
CALCIUM SERPL-MCNC: 9.5 MG/DL
CAST: 0 /LPF
CHLORIDE SERPL-SCNC: 102 MMOL/L
CHOLEST SERPL-MCNC: 169 MG/DL
CO2 SERPL-SCNC: 24 MMOL/L
COLOR: YELLOW
CREAT SERPL-MCNC: 0.61 MG/DL
EGFR: 121 ML/MIN/1.73M2
EOSINOPHIL # BLD AUTO: 0.11 K/UL
EOSINOPHIL NFR BLD AUTO: 1.7 %
EPITHELIAL CELLS: 0 /HPF
ESTIMATED AVERAGE GLUCOSE: 105 MG/DL
GLUCOSE QUALITATIVE U: NEGATIVE MG/DL
GLUCOSE SERPL-MCNC: 91 MG/DL
HBA1C MFR BLD HPLC: 5.3 %
HCT VFR BLD CALC: 39.8 %
HDLC SERPL-MCNC: 56 MG/DL
HGB BLD-MCNC: 12.9 G/DL
IMM GRANULOCYTES NFR BLD AUTO: 0.2 %
KETONES URINE: NEGATIVE MG/DL
LDLC SERPL CALC-MCNC: 99 MG/DL
LEUKOCYTE ESTERASE URINE: NEGATIVE
LYMPHOCYTES # BLD AUTO: 1.92 K/UL
LYMPHOCYTES NFR BLD AUTO: 30 %
MAN DIFF?: NORMAL
MCHC RBC-ENTMCNC: 27.6 PG
MCHC RBC-ENTMCNC: 32.4 GM/DL
MCV RBC AUTO: 85.2 FL
MICROSCOPIC-UA: NORMAL
MONOCYTES # BLD AUTO: 0.48 K/UL
MONOCYTES NFR BLD AUTO: 7.5 %
NEUTROPHILS # BLD AUTO: 3.85 K/UL
NEUTROPHILS NFR BLD AUTO: 60.1 %
NITRITE URINE: NEGATIVE
NONHDLC SERPL-MCNC: 112 MG/DL
PH URINE: 6.5
PLATELET # BLD AUTO: 398 K/UL
POTASSIUM SERPL-SCNC: 4.7 MMOL/L
PROT SERPL-MCNC: 7.8 G/DL
PROTEIN URINE: NEGATIVE MG/DL
RBC # BLD: 4.67 M/UL
RBC # FLD: 13.4 %
RED BLOOD CELLS URINE: 0 /HPF
SODIUM SERPL-SCNC: 139 MMOL/L
SPECIFIC GRAVITY URINE: 1.01
T4 FREE SERPL-MCNC: 1.6 NG/DL
TRIGL SERPL-MCNC: 71 MG/DL
TSH SERPL-ACNC: 1.22 UIU/ML
UROBILINOGEN URINE: 0.2 MG/DL
VIT B12 SERPL-MCNC: 899 PG/ML
WBC # FLD AUTO: 6.4 K/UL
WHITE BLOOD CELLS URINE: 0 /HPF

## 2024-05-20 ENCOUNTER — RX ONLY (RX ONLY)
Age: 34
End: 2024-05-20

## 2024-05-20 ENCOUNTER — OFFICE (OUTPATIENT)
Dept: URBAN - METROPOLITAN AREA CLINIC 109 | Facility: CLINIC | Age: 34
Setting detail: OPHTHALMOLOGY
End: 2024-05-20
Payer: COMMERCIAL

## 2024-05-20 DIAGNOSIS — H40.052: ICD-10-CM

## 2024-05-20 PROCEDURE — 92020 GONIOSCOPY: CPT | Performed by: OPHTHALMOLOGY

## 2024-05-20 PROCEDURE — 99213 OFFICE O/P EST LOW 20 MIN: CPT | Performed by: OPHTHALMOLOGY

## 2024-05-20 ASSESSMENT — CONFRONTATIONAL VISUAL FIELD TEST (CVF)
OS_FINDINGS: FULL
OD_FINDINGS: FULL

## 2024-05-24 ENCOUNTER — OFFICE (OUTPATIENT)
Facility: LOCATION | Age: 34
Setting detail: OPHTHALMOLOGY
End: 2024-05-24
Payer: COMMERCIAL

## 2024-05-24 DIAGNOSIS — H40.052: ICD-10-CM

## 2024-05-24 PROCEDURE — 92012 INTRM OPH EXAM EST PATIENT: CPT | Performed by: OPHTHALMOLOGY

## 2024-05-24 PROCEDURE — 92133 CPTRZD OPH DX IMG PST SGM ON: CPT | Performed by: OPHTHALMOLOGY

## 2024-05-24 ASSESSMENT — CONFRONTATIONAL VISUAL FIELD TEST (CVF)
OS_FINDINGS: FULL
OD_FINDINGS: FULL

## 2024-05-30 ENCOUNTER — OFFICE (OUTPATIENT)
Dept: URBAN - METROPOLITAN AREA CLINIC 102 | Facility: CLINIC | Age: 34
Setting detail: OPHTHALMOLOGY
End: 2024-05-30
Payer: COMMERCIAL

## 2024-05-30 DIAGNOSIS — H40.052: ICD-10-CM

## 2024-05-30 PROCEDURE — 92012 INTRM OPH EXAM EST PATIENT: CPT | Performed by: OPHTHALMOLOGY

## 2024-05-30 ASSESSMENT — CONFRONTATIONAL VISUAL FIELD TEST (CVF)
OS_FINDINGS: FULL
OD_FINDINGS: FULL

## 2024-06-06 ENCOUNTER — RX RENEWAL (OUTPATIENT)
Age: 34
End: 2024-06-06

## 2024-06-24 ENCOUNTER — APPOINTMENT (OUTPATIENT)
Dept: GASTROENTEROLOGY | Facility: CLINIC | Age: 34
End: 2024-06-24
Payer: COMMERCIAL

## 2024-06-24 VITALS
WEIGHT: 145 LBS | OXYGEN SATURATION: 98 % | HEIGHT: 62 IN | DIASTOLIC BLOOD PRESSURE: 88 MMHG | SYSTOLIC BLOOD PRESSURE: 122 MMHG | BODY MASS INDEX: 26.68 KG/M2 | HEART RATE: 96 BPM

## 2024-06-24 DIAGNOSIS — K21.9 GASTRO-ESOPHAGEAL REFLUX DISEASE W/OUT ESOPHAGITIS: ICD-10-CM

## 2024-06-24 PROCEDURE — 99214 OFFICE O/P EST MOD 30 MIN: CPT

## 2024-06-24 PROCEDURE — G2211 COMPLEX E/M VISIT ADD ON: CPT

## 2024-06-24 RX ORDER — LANSOPRAZOLE 15 MG/1
15 CAPSULE, DELAYED RELEASE ORAL DAILY
Qty: 30 | Refills: 11 | Status: ACTIVE | COMMUNITY
Start: 2024-06-24 | End: 1900-01-01

## 2024-06-25 ENCOUNTER — APPOINTMENT (OUTPATIENT)
Dept: DERMATOLOGY | Facility: CLINIC | Age: 34
End: 2024-06-25
Payer: COMMERCIAL

## 2024-06-25 DIAGNOSIS — M35.1 OTHER OVERLAP SYNDROMES: ICD-10-CM

## 2024-06-25 DIAGNOSIS — I78.1 NEVUS, NON-NEOPLASTIC: ICD-10-CM

## 2024-06-25 PROCEDURE — 17106 DSTR CUT VSC PRLF LES<10SQCM: CPT | Mod: 58

## 2024-07-02 ENCOUNTER — RX RENEWAL (OUTPATIENT)
Age: 34
End: 2024-07-02

## 2024-08-06 ENCOUNTER — APPOINTMENT (OUTPATIENT)
Dept: OPHTHALMOLOGY | Facility: CLINIC | Age: 34
End: 2024-08-06

## 2024-08-06 ENCOUNTER — NON-APPOINTMENT (OUTPATIENT)
Age: 34
End: 2024-08-06

## 2024-08-06 PROCEDURE — 92134 CPTRZ OPH DX IMG PST SGM RTA: CPT

## 2024-08-06 PROCEDURE — 92004 COMPRE OPH EXAM NEW PT 1/>: CPT | Mod: 25

## 2024-08-06 PROCEDURE — 92083 EXTENDED VISUAL FIELD XM: CPT

## 2024-08-13 ENCOUNTER — APPOINTMENT (OUTPATIENT)
Dept: RHEUMATOLOGY | Facility: CLINIC | Age: 34
End: 2024-08-13
Payer: COMMERCIAL

## 2024-08-13 VITALS
OXYGEN SATURATION: 95 % | DIASTOLIC BLOOD PRESSURE: 88 MMHG | TEMPERATURE: 97.1 F | HEIGHT: 62 IN | BODY MASS INDEX: 29.44 KG/M2 | HEART RATE: 106 BPM | SYSTOLIC BLOOD PRESSURE: 138 MMHG | WEIGHT: 160 LBS

## 2024-08-13 DIAGNOSIS — Z79.899 OTHER LONG TERM (CURRENT) DRUG THERAPY: ICD-10-CM

## 2024-08-13 DIAGNOSIS — E87.20 ACIDOSIS, UNSPECIFIED: ICD-10-CM

## 2024-08-13 DIAGNOSIS — J84.9 INTERSTITIAL PULMONARY DISEASE, UNSPECIFIED: ICD-10-CM

## 2024-08-13 DIAGNOSIS — R63.5 ABNORMAL WEIGHT GAIN: ICD-10-CM

## 2024-08-13 DIAGNOSIS — M35.1 OTHER OVERLAP SYNDROMES: ICD-10-CM

## 2024-08-13 DIAGNOSIS — F41.9 ANXIETY DISORDER, UNSPECIFIED: ICD-10-CM

## 2024-08-13 DIAGNOSIS — I73.00 RAYNAUD'S SYNDROME W/OUT GANGRENE: ICD-10-CM

## 2024-08-13 DIAGNOSIS — K22.4 DYSKINESIA OF ESOPHAGUS: ICD-10-CM

## 2024-08-13 PROCEDURE — G2211 COMPLEX E/M VISIT ADD ON: CPT

## 2024-08-13 PROCEDURE — 99215 OFFICE O/P EST HI 40 MIN: CPT

## 2024-08-13 RX ORDER — LANSOPRAZOLE 15 MG/1
15 CAPSULE, DELAYED RELEASE ORAL
Refills: 0 | Status: ACTIVE | COMMUNITY
Start: 2024-08-13

## 2024-08-13 RX ORDER — CYCLOBENZAPRINE HYDROCHLORIDE 5 MG/1
5 TABLET, FILM COATED ORAL
Qty: 60 | Refills: 2 | Status: ACTIVE | COMMUNITY
Start: 2024-08-13 | End: 1900-01-01

## 2024-08-14 ENCOUNTER — LABORATORY RESULT (OUTPATIENT)
Age: 34
End: 2024-08-14

## 2024-08-14 LAB
ANION GAP SERPL CALC-SCNC: 13 MMOL/L
APPEARANCE: CLEAR
BILIRUBIN URINE: NEGATIVE
BLOOD URINE: ABNORMAL
BUN SERPL-MCNC: 11 MG/DL
CALCIUM SERPL-MCNC: 10.1 MG/DL
CHLORIDE SERPL-SCNC: 102 MMOL/L
CO2 SERPL-SCNC: 25 MMOL/L
COLOR: YELLOW
CREAT SERPL-MCNC: 0.66 MG/DL
CREAT SPEC-SCNC: 10 MG/DL
CREAT/PROT UR: NORMAL RATIO
CRP SERPL-MCNC: 3 MG/L
EGFR: 119 ML/MIN/1.73M2
ERYTHROCYTE [SEDIMENTATION RATE] IN BLOOD BY WESTERGREN METHOD: 26 MM/HR
GLUCOSE QUALITATIVE U: NEGATIVE MG/DL
GLUCOSE SERPL-MCNC: 97 MG/DL
HCT VFR BLD CALC: 39.1 %
HGB BLD-MCNC: 12.4 G/DL
KETONES URINE: NEGATIVE MG/DL
LEUKOCYTE ESTERASE URINE: NEGATIVE
MCHC RBC-ENTMCNC: 27.2 PG
MCHC RBC-ENTMCNC: 31.7 GM/DL
MCV RBC AUTO: 85.7 FL
NITRITE URINE: NEGATIVE
PH URINE: 7
PLATELET # BLD AUTO: 372 K/UL
POTASSIUM SERPL-SCNC: 4.2 MMOL/L
PROT UR-MCNC: <4 MG/DL
PROTEIN URINE: NEGATIVE MG/DL
RBC # BLD: 4.56 M/UL
RBC # FLD: 13.2 %
SODIUM SERPL-SCNC: 140 MMOL/L
SPECIFIC GRAVITY URINE: <1.005
UROBILINOGEN URINE: 0.2 MG/DL
WBC # FLD AUTO: 5.9 K/UL

## 2024-08-14 NOTE — PHYSICAL EXAM
[General Appearance - Alert] : alert [General Appearance - In No Acute Distress] : in no acute distress [General Appearance - Well Nourished] : well nourished [General Appearance - Well Developed] : well developed [General Appearance - Well-Appearing] : healthy appearing [Sclera] : the sclera and conjunctiva were normal [Outer Ear] : the ears and nose were normal in appearance [Examination Of The Oral Cavity] : the lips and gums were normal [Nasal Cavity] : the nasal mucosa and septum were normal [Oropharynx] : the oropharynx was normal [Neck Appearance] : the appearance of the neck was normal [Respiration, Rhythm And Depth] : normal respiratory rhythm and effort [Exaggerated Use Of Accessory Muscles For Inspiration] : no accessory muscle use [Auscultation Breath Sounds / Voice Sounds] : lungs were clear to auscultation bilaterally [Heart Rate And Rhythm] : heart rate was normal and rhythm regular [Heart Sounds] : normal S1 and S2 [Heart Sounds Gallop] : no gallops [Murmurs] : no murmurs [Heart Sounds Pericardial Friction Rub] : no pericardial rub [Edema] : there was no peripheral edema [Veins - Varicosity Changes] : there were no varicosital changes [Cervical Lymph Nodes Enlarged Posterior Bilaterally] : posterior cervical [Supraclavicular Lymph Nodes Enlarged Bilaterally] : supraclavicular [No CVA Tenderness] : no ~M costovertebral angle tenderness [No Spinal Tenderness] : no spinal tenderness [Skin Color & Pigmentation] : normal skin color and pigmentation [Skin Turgor] : normal skin turgor [] : no rash [Sensation] : the sensory exam was normal to light touch and pinprick [Motor Exam] : the motor exam was normal [Oriented To Time, Place, And Person] : oriented to person, place, and time [Impaired Insight] : insight and judgment were intact [Affect] : the affect was normal [Abnormal Walk] : normal gait [Nail Clubbing] : no clubbing  or cyanosis of the fingernails [Motor Tone] : muscle strength and tone were normal [Musculoskeletal - Swelling] : no joint swelling seen [FreeTextEntry1] : R hip rROM w/o POM 15-20 degrees, L hip rROM w/o POM 10 degrees; sclerodactyly b/l hands maintaining very good  strength and maintaining limited ROM, R1 IP b/l TTP mild lateral deviation. Previous exam: Has bunion on R 1 MTP; sclerodactyly from MCPs and distally lacking fROM but  w/ good  strength; R hip severely limited with no pom- minimal internal- ; L hip no Pom limited external 10-20 and nl interna. all other joints nl.  no synovitis

## 2024-08-14 NOTE — ASSESSMENT
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 AVN (not clearly defined), advanced raynauds, telangiectasias presents for routine follow-up - frustrated with unintentioinal wt gain > 15 lbs in 6m   1)  MCTD/Limited Scleroderma:  SHERI 1:2560S, + RNP > 8.0, SM 3.8, + RF 45 (neg CCP) w/ CK > 200 (intermittently) but no overt weakness now or in past.   All other serologies to include APS neg w/ nl C3/4, dsDNA, SSA/B  Primary sx:  Raynauds, sclerodactyly, telangiectasias, mild sicca/ keratoconjunctivitis, NEW onset calcinosis palmar surface both hands and heels of both feet.. 12/21.. in addition to low grade fevers, arthralgias / myalgias and fatigue when "flaring". Added MMF 3 gm daily initially (well tolerated )  w/ HCQ (200 mg= 2.85mg/kg @ 70-72kg) with routine opth exam, last Oph 2/20/24 stable.  Pulm function improved, calcinosis resolved.. and lowered MMF to 2 gm  3/22- and continues to  remain stable.  No need to adjust tx, also note less frequent infections.  When doesn't have URI feels well exercises without limitation, since last visit and no recent URI (on lower dose MMF)  Concerned about immunosuppression- with routine exposure to young children... will continue w/ nasal steroids but wonder if anything else should be considered. Doing well now, no further change in tx needed.. but in future may consider ?? montelukast.. really can't lower DMARDs... 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   Summary Status by symptom: - Raynaud's moderate to severe at times, tried and failed to tolerate nifedipine/ amlodipine now on felodipine at 10 mg and doing well now. Noted digital pitting but no report of active ulcerations -.. nothing for past few ys.. .    - Sclerodactyly can't really say if any better but definitely not worse. ROM limited by tightness but  str good and functionally without limitations at this point. No recommendation for changes to tx at this time. - GERD (presumed esophageal dysmotility) severe at times w/ dx H Pylori 12/20 completed triple tx, not well tolerated, now sx better on daily PPI 20 mg and H2 40 mg.  Unable to miss dose of PPI Working w/ Dr Red Last GI visit 06/2023- again 6/24 stable did not feel EGD necessary.  Sx stable but still encouraged to lower PPI- change to lansoprazole at 15 mg..continue with HS dose famotidine. Excellent with NP strategies-  - Telangiectasias/ Calcinosis:  No limitations in daily activity no additional recommendations at this time ----Moderate to severe over face, chest s/p laser tx in past and again working with Dr. Rutherford now.... pleased with outcome.  Calcinosis noted 12/21- stable not painful.. if progresses b/l hands and now noted on heels of both feet.. working with podiatry q 6 m reduced through scraping.. will refer to OT for hand tx as needed (not necessary at this time.. though would be helpful given sclerodactyly -not necessary at this point) - Sclerodactyly: Moderate in limited pattern, distal to PIPs and around mouth ..stable x many ys and hands look sl better. No recommendations for changes to treatment at this time. -Cardiopulmonary:  Routine PFTs w/ DLCO 79-> 74-> 68 (2018)-> 70 (2019)-> 58 (3/21)-> 68 (12/22) w/ volumes relatively well preserved over time. 10/23 (DLCO not assessed at this visit).. no reported hx of pleuropericardial effusion and completely denies SOB/JENSEN  cough or change in activity tolerance (exercises routinely).  updated CTScan 9/22-> 10/23 actually improved with mild- moderate resolution of GGO now on MMF..stable overall  Last Echo 08/23: thickening of the anterior mitral leaflet especially at the tip, trace-mild mitral insufficiency. Last 10/23 PFT DLCO not reported f/u pulm scheduled 04/24 - Serositis: elevated LFTs to 900 in past x 1 but repeat studies nl as of 02/24-> still 8/24  etiology unclear. No renal dysfunction, no pleural/pericardial effusions.    Proteinuria: 02/24 no proteinuria. Intermittently + 200-400mg currently controlled - Myopathy:  mildly elevated CK max 259 7/20... since 109-217 w/ no previous weakness now or in past still nl NOTE:  3/21 Myomarker 3 NEG 2019 mild proteinuria PCR 0.4-> nl now  neg REGGIE, Scl70, RNA polymerase, ANCA  - 2017 adm to [place] w/ PNA + response to antibiotics w/ + response immediately - 2015/2016 she went off Plaquenil but restarted it because of the Raynaud's/digital pits, restarted and sx stable  2) Sleep: much improved following trail amitriptyline 10mg has since discontinued since the holidays 2023.  In Feb sleep was still good, now reports insomnia- trouble staying asleep.. encouraged to resume but concerned about new issue with wt gain - Occas neck pain/ stiffness, will instead trial cyclobenzaprine encouraged to try nightly... 5-20 mg as needed and tolerated.  Does use occas Ambien when trouble falling asleep- low dose 2.5 mg few times/ month.   3) Anxiety- stable on sertraline 50mg daily, followed by  counselor. No recommendation for changes to tx at this time.  4)  Hypothyroidism on replacement w/ nl TSH 02/24-> still 8/24   5) Family planning: Oral contraceptive use: changed brands to a low estrogen containing pill- currently off, not in relationship, denies sexual activity.  Discussed current reproductive guidelines .... and is interested in pregnancy in next few ys. Discussed use of MMF and need to stop 6-8 wks (at minimum) before conception... understood clearly, will contact - unchanged today -No updates on family planning, patient is aware teratogenic effects MMF, need to notify provider if actively pursuing family   6)  OA hips: primary vs. secondary. ? AVN (need updated eval). bilateral R> L w/ limited ROM and daily pain worse at times, doing well now. No change in intermitted hip and improvement in limited ROM R>L continues to work closely with /PT student. No additional recommendation for imaging or tx at this time. Xray Hips:  moderate OA, etiology NOTE:  Tx  acupuncture and PT w/ limited benefit   7) Wt Gain:  unintentional noted more than 10 lbs in past year despite eating healthy and exercising routinely.  Reviewed med list, nothing overtly obesegenic.  Will look at AI trace for counting calories and tracking activity and try to get referral to endo???  Menses regular with no significant change.   Updated vaccines: - COVID 12/23 - Shingles needed  - annual HD influenza - PNA:  has completed PCV 13/ PNA 23, would suggest 20 if continues with recurrent infections  - RSV:  should consider 2/2 high risk exposure and immunosuppression    Plan:  - Continue on 2.5 mg of ambien can go up to 10 mg PRN for sleep disruption when sick  -Remain off amitriptyline will revisit if recurrence sleep disturbances... instead consider cyclobenzaprine 5-20 mg as needed / tolerated    - Updated COVID (? annually based on recommendations- availability) , still no Shingrix again recommended and PCV 20/ RSV   - complete labs before next visit - but repeat BMP now to assess CO2?? why was this so low??   - Follow-up specialists as scheduled gastro (Dr. Red), pulm (Dr. Mei),- please get full PFTs/ DLCO this year.. last done 2022 cardiology (Dr. Jeffrey Pate [phone]) ....at least annually  - No urogyn referral needed at this time but will continue to reassess  - Continue HCQ... 200 mg routinely... eye exam q 6m  - Recommended stopping MMF 6 m prior to conception only.. then assess for disease stability... would likely need to consider AZA as replacement   - Continue felodipine 10 mg, call immediately if any ulcerations present    - continue to work with derm  - Is aware to call if there is any change in her underlying symptoms.  -Will task pulm regarding DLCO for rheum note update ILD status....asymptomatic and previously stable PFTs  - Keep using mometasone nasal spray through winter months   - RTO 6 m

## 2024-08-14 NOTE — HISTORY OF PRESENT ILLNESS
[FreeTextEntry1] : 8/13/24 wt gain noted.. 145-160... not sure why- never eats past 7 pm.  Exercising (more) and eating better.. with nl TFTs.. Energy level.., menses totally regular. Does not need another EGD - still working with GI - unable to skip dose of PPI- changing to lansoprazole for slightly lower dose and continue daily famotidine - sleep continues to be poor..   trouble staying asleep.  PRN use of ambien few times/ month but problem more often wakes in middle night and can be up fr hours - ROS: nothing new.. beyond wt as noted above.  FOllows closely q 6 m with GI/ PUlm/ Cardiology- overall condition stable.  Sx: -RP: Currently taking felodipine 10mg with good control GERD: Controlled on 20mg omeprazole PRN and 40mg famotidine nightly has not had repeat EGD. Now trying to taper off regular omeprazole. Last GI visit 06/2023 updated EDG not recommended. F/u scheduled for 06/2024.  Working with Brianne Red Sclerodactyly:  moderate and limited pattern distal to PIPs and around mouth -STABLE Cardiopulmonary: last Echo 08/2023, Last PFT/CT 10/23 f/u appt scheduled 04/2024 Serositis: nl LFTs 02/24. No renal dysfunction, no pleural/pericardial effusions. No proteinuria 02/24 Myopathy: nl CK 02/24 w/o weaknessno cardiopulmonary sx. RP stable- no ulcerations ongoing use of felodipine 10 mg daily, MOAP stable- able to cmplete dental work without problems.  labs 8/24 ok except for CO2 low at 18.. with no previous hx renal calculi and overall health excellent .  otherwise..  CRP 6, ER 39 neg/nl: UA, CBC, CMP, C3/C4, Pr/Cr, TSH, CK, DsDNA   Current tx: Currently MMF 1000mg BID and continues w/ HCQ (200 mg= 2.85 mg/kg @ 70kg) Last Oph 8/24 stable    Sleep/Mood -Started amitriptyline with improved sleep but concerned about wt gain.. Felt sleep was better but doesn't want to continue 2/2 wt gain potential.. already having issues  -Sertraline tolerating well and seeing mental health counselor stable dose x many ys, doesn't feel this is reason for wt gain.  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  MCTD/Limited Scleroderma:  consisting of Raynaud's, GERD (presumed esophageal dysmotility), telangiectases, sclerodactyly, RNP Ab.   2.  GERD. Pt on ranitidine. GERD symptoms on and off. No dysphagia.  3.  Hypothyroidism 4.  Myopathy: mild elevation of CKs on a couple of occasions 5.  Plaquenil use:  no ocular symptoms.  6.  Oral contraceptive use: changed brands to a low estrogen containing pill. 7.  Raynauds:  more problematic in the hands than feet. with pitting  .  9.  Avascular necrosis of hips:   bilateral.  moderate to severe R > L w/ advanced DJD   An x-ray was read as moderate OA of the hip (acetabular sclerosis and osteophytes).   10. PNa 2017  Antibiotics were administered, and she improved. 12.  Resting tachycardia: no dyspnea or chest pain

## 2024-08-14 NOTE — REVIEW OF SYSTEMS
[Cough] : cough [Heartburn] : heartburn [As Noted in HPI] : as noted in HPI [Joint Pain] : joint pain [Anxiety] : anxiety [Negative] : Heme/Lymph [FreeTextEntry2] : stable wt battling recurrent viral infections - RESOLVED [FreeTextEntry7] : on 20 mg of omeprazole in the morning and 40 mg of famotidine at night for reflux which is controlled [de-identified] : laser tx for telangiectasias and digital pitting - nothing new  [de-identified] : improved

## 2024-08-14 NOTE — ASSESSMENT
[FreeTextEntry1] : 35 yo wf with long hx Limited SSc, MCTD + RNP w/  advanced DJD b/l hips ? AVN (not clearly defined), advanced raynauds, telangiectasias presents for routine follow-up - frustrated with unintentioinal wt gain > 15 lbs in 6m   1)  MCTD/Limited Scleroderma:  SHERI 1:2560S, + RNP > 8.0, SM 3.8, + RF 45 (neg CCP) w/ CK > 200 (intermittently) but no overt weakness now or in past.   All other serologies to include APS neg w/ nl C3/4, dsDNA, SSA/B  Primary sx:  Raynauds, sclerodactyly, telangiectasias, mild sicca/ keratoconjunctivitis, NEW onset calcinosis palmar surface both hands and heels of both feet.. 12/21.. in addition to low grade fevers, arthralgias / myalgias and fatigue when "flaring". Added MMF 3 gm daily initially (well tolerated )  w/ HCQ (200 mg= 2.85mg/kg @ 70-72kg) with routine opth exam, last Oph 2/20/24 stable.  Pulm function improved, calcinosis resolved.. and lowered MMF to 2 gm  3/22- and continues to  remain stable.  No need to adjust tx, also note less frequent infections.  When doesn't have URI feels well exercises without limitation, since last visit and no recent URI (on lower dose MMF)  Concerned about immunosuppression- with routine exposure to young children... will continue w/ nasal steroids but wonder if anything else should be considered. Doing well now, no further change in tx needed.. but in future may consider ?? montelukast.. really can't lower DMARDs... 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   Summary Status by symptom: - Raynaud's moderate to severe at times, tried and failed to tolerate nifedipine/ amlodipine now on felodipine at 10 mg and doing well now. Noted digital pitting but no report of active ulcerations -.. nothing for past few ys.. .    - Sclerodactyly can't really say if any better but definitely not worse. ROM limited by tightness but  str good and functionally without limitations at this point. No recommendation for changes to tx at this time. - GERD (presumed esophageal dysmotility) severe at times w/ dx H Pylori 12/20 completed triple tx, not well tolerated, now sx better on daily PPI 20 mg and H2 40 mg.  Unable to miss dose of PPI Working w/ Dr Red Last GI visit 06/2023- again 6/24 stable did not feel EGD necessary.  Sx stable but still encouraged to lower PPI- change to lansoprazole at 15 mg..continue with HS dose famotidine. Excellent with NP strategies-  - Telangiectasias/ Calcinosis:  No limitations in daily activity no additional recommendations at this time ----Moderate to severe over face, chest s/p laser tx in past and again working with Dr. Rutherford now.... pleased with outcome.  Calcinosis noted 12/21- stable not painful.. if progresses b/l hands and now noted on heels of both feet.. working with podiatry q 6 m reduced through scraping.. will refer to OT for hand tx as needed (not necessary at this time.. though would be helpful given sclerodactyly -not necessary at this point) - Sclerodactyly: Moderate in limited pattern, distal to PIPs and around mouth ..stable x many ys and hands look sl better. No recommendations for changes to treatment at this time. -Cardiopulmonary:  Routine PFTs w/ DLCO 79-> 74-> 68 (2018)-> 70 (2019)-> 58 (3/21)-> 68 (12/22) w/ volumes relatively well preserved over time. 10/23 (DLCO not assessed at this visit).. no reported hx of pleuropericardial effusion and completely denies SOB/JENSEN  cough or change in activity tolerance (exercises routinely).  updated CTScan 9/22-> 10/23 actually improved with mild- moderate resolution of GGO now on MMF..stable overall  Last Echo 08/23: thickening of the anterior mitral leaflet especially at the tip, trace-mild mitral insufficiency. Last 10/23 PFT DLCO not reported f/u pulm scheduled 04/24 - Serositis: elevated LFTs to 900 in past x 1 but repeat studies nl as of 02/24-> still 8/24  etiology unclear. No renal dysfunction, no pleural/pericardial effusions.    Proteinuria: 02/24 no proteinuria. Intermittently + 200-400mg currently controlled - Myopathy:  mildly elevated CK max 259 7/20... since 109-217 w/ no previous weakness now or in past still nl NOTE:  3/21 Myomarker 3 NEG 2019 mild proteinuria PCR 0.4-> nl now  neg REGGIE, Scl70, RNA polymerase, ANCA  - 2017 adm to [place] w/ PNA + response to antibiotics w/ + response immediately - 2015/2016 she went off Plaquenil but restarted it because of the Raynaud's/digital pits, restarted and sx stable  2) Sleep: much improved following trail amitriptyline 10mg has since discontinued since the holidays 2023.  In Feb sleep was still good, now reports insomnia- trouble staying asleep.. encouraged to resume but concerned about new issue with wt gain - Occas neck pain/ stiffness, will instead trial cyclobenzaprine encouraged to try nightly... 5-20 mg as needed and tolerated.  Does use occas Ambien when trouble falling asleep- low dose 2.5 mg few times/ month.   3) Anxiety- stable on sertraline 50mg daily, followed by  counselor. No recommendation for changes to tx at this time.  4)  Hypothyroidism on replacement w/ nl TSH 02/24-> still 8/24   5) Family planning: Oral contraceptive use: changed brands to a low estrogen containing pill- currently off, not in relationship, denies sexual activity.  Discussed current reproductive guidelines .... and is interested in pregnancy in next few ys. Discussed use of MMF and need to stop 6-8 wks (at minimum) before conception... understood clearly, will contact - unchanged today -No updates on family planning, patient is aware teratogenic effects MMF, need to notify provider if actively pursuing family   6)  OA hips: primary vs. secondary. ? AVN (need updated eval). bilateral R> L w/ limited ROM and daily pain worse at times, doing well now. No change in intermitted hip and improvement in limited ROM R>L continues to work closely with /PT student. No additional recommendation for imaging or tx at this time. Xray Hips:  moderate OA, etiology NOTE:  Tx  acupuncture and PT w/ limited benefit   7) Wt Gain:  unintentional noted more than 10 lbs in past year despite eating healthy and exercising routinely.  Reviewed med list, nothing overtly obesegenic.  Will look at AI trace for counting calories and tracking activity and try to get referral to endo???  Menses regular with no significant change.   Updated vaccines: - COVID 12/23 - Shingles needed  - annual HD influenza - PNA:  has completed PCV 13/ PNA 23, would suggest 20 if continues with recurrent infections  - RSV:  should consider 2/2 high risk exposure and immunosuppression    Plan:  - Continue on 2.5 mg of ambien can go up to 10 mg PRN for sleep disruption when sick  -Remain off amitriptyline will revisit if recurrence sleep disturbances... instead consider cyclobenzaprine 5-20 mg as needed / tolerated    - Updated COVID (? annually based on recommendations- availability) , still no Shingrix again recommended and PCV 20/ RSV   - complete labs before next visit - but repeat BMP now to assess CO2?? why was this so low??   - Follow-up specialists as scheduled gastro (Dr. Red), pulm (Dr. Mei),- please get full PFTs/ DLCO this year.. last done 2022 cardiology (Dr. Jeffrey Pate [phone]) ....at least annually  - No urogyn referral needed at this time but will continue to reassess  - Continue HCQ... 200 mg routinely... eye exam q 6m  - Recommended stopping MMF 6 m prior to conception only.. then assess for disease stability... would likely need to consider AZA as replacement   - Continue felodipine 10 mg, call immediately if any ulcerations present    - continue to work with derm  - Is aware to call if there is any change in her underlying symptoms.  -Will task pulm regarding DLCO for rheum note update ILD status....asymptomatic and previously stable PFTs  - Keep using mometasone nasal spray through winter months   - RTO 6 m

## 2024-08-14 NOTE — REVIEW OF SYSTEMS
[Cough] : cough [Heartburn] : heartburn [As Noted in HPI] : as noted in HPI [Joint Pain] : joint pain [Anxiety] : anxiety [Negative] : Heme/Lymph [FreeTextEntry2] : stable wt battling recurrent viral infections - RESOLVED [FreeTextEntry7] : on 20 mg of omeprazole in the morning and 40 mg of famotidine at night for reflux which is controlled [de-identified] : laser tx for telangiectasias and digital pitting - nothing new  [de-identified] : improved

## 2024-08-15 LAB
C3 SERPL-MCNC: 143 MG/DL
C4 SERPL-MCNC: 22 MG/DL
DSDNA AB SER-ACNC: <1 IU/ML

## 2024-08-16 ENCOUNTER — RX RENEWAL (OUTPATIENT)
Age: 34
End: 2024-08-16

## 2024-09-26 ENCOUNTER — APPOINTMENT (OUTPATIENT)
Dept: PULMONOLOGY | Facility: CLINIC | Age: 34
End: 2024-09-26
Payer: COMMERCIAL

## 2024-09-26 VITALS — WEIGHT: 154 LBS | BODY MASS INDEX: 29.07 KG/M2 | HEIGHT: 61 IN

## 2024-09-26 PROCEDURE — 94010 BREATHING CAPACITY TEST: CPT

## 2024-09-30 ENCOUNTER — TRANSCRIPTION ENCOUNTER (OUTPATIENT)
Age: 34
End: 2024-09-30

## 2024-10-03 ENCOUNTER — APPOINTMENT (OUTPATIENT)
Dept: INTERNAL MEDICINE | Facility: CLINIC | Age: 34
End: 2024-10-03

## 2024-10-03 ENCOUNTER — MED ADMIN CHARGE (OUTPATIENT)
Age: 34
End: 2024-10-03

## 2024-10-03 VITALS
SYSTOLIC BLOOD PRESSURE: 142 MMHG | RESPIRATION RATE: 16 BRPM | TEMPERATURE: 98.3 F | OXYGEN SATURATION: 98 % | HEART RATE: 98 BPM | DIASTOLIC BLOOD PRESSURE: 90 MMHG

## 2024-10-03 PROCEDURE — 90662 IIV NO PRSV INCREASED AG IM: CPT

## 2024-10-03 PROCEDURE — G0008: CPT

## 2024-10-04 ENCOUNTER — APPOINTMENT (OUTPATIENT)
Dept: RHEUMATOLOGY | Facility: CLINIC | Age: 34
End: 2024-10-04
Payer: COMMERCIAL

## 2024-10-04 VITALS
TEMPERATURE: 97.1 F | BODY MASS INDEX: 29.64 KG/M2 | HEART RATE: 106 BPM | OXYGEN SATURATION: 98 % | DIASTOLIC BLOOD PRESSURE: 78 MMHG | WEIGHT: 157 LBS | SYSTOLIC BLOOD PRESSURE: 108 MMHG | HEIGHT: 61 IN

## 2024-10-04 DIAGNOSIS — M87.059 IDIOPATHIC ASEPTIC NECROSIS OF UNSPECIFIED FEMUR: ICD-10-CM

## 2024-10-04 DIAGNOSIS — R91.8 OTHER NONSPECIFIC ABNORMAL FINDING OF LUNG FIELD: ICD-10-CM

## 2024-10-04 DIAGNOSIS — K22.4 DYSKINESIA OF ESOPHAGUS: ICD-10-CM

## 2024-10-04 DIAGNOSIS — K21.9 GASTRO-ESOPHAGEAL REFLUX DISEASE W/OUT ESOPHAGITIS: ICD-10-CM

## 2024-10-04 DIAGNOSIS — R63.5 ABNORMAL WEIGHT GAIN: ICD-10-CM

## 2024-10-04 DIAGNOSIS — I78.1 NEVUS, NON-NEOPLASTIC: ICD-10-CM

## 2024-10-04 DIAGNOSIS — F41.9 ANXIETY DISORDER, UNSPECIFIED: ICD-10-CM

## 2024-10-04 DIAGNOSIS — I73.00 RAYNAUD'S SYNDROME W/OUT GANGRENE: ICD-10-CM

## 2024-10-04 PROBLEM — L98.491: Status: ACTIVE | Noted: 2024-10-04

## 2024-10-04 PROCEDURE — 96372 THER/PROPH/DIAG INJ SC/IM: CPT

## 2024-10-04 PROCEDURE — 99215 OFFICE O/P EST HI 40 MIN: CPT | Mod: 25

## 2024-10-04 PROCEDURE — G2211 COMPLEX E/M VISIT ADD ON: CPT | Mod: NC

## 2024-10-04 RX ORDER — NITROGLYCERIN 20 MG/G
2 OINTMENT TOPICAL
Qty: 1 | Refills: 1 | Status: ACTIVE | COMMUNITY
Start: 2024-10-04 | End: 1900-01-01

## 2024-10-04 RX ORDER — PREDNISONE 10 MG/1
10 TABLET ORAL
Qty: 28 | Refills: 1 | Status: ACTIVE | COMMUNITY
Start: 2024-10-04 | End: 1900-01-01

## 2024-10-04 RX ADMIN — TRIAMCINOLONE ACETONIDE 0 MG/ML: 40 INJECTION, SUSPENSION INTRA-ARTICULAR; INTRAMUSCULAR at 00:00

## 2024-10-07 ENCOUNTER — APPOINTMENT (OUTPATIENT)
Dept: PULMONOLOGY | Facility: CLINIC | Age: 34
End: 2024-10-07
Payer: COMMERCIAL

## 2024-10-07 VITALS — HEIGHT: 61 IN | BODY MASS INDEX: 29.07 KG/M2 | WEIGHT: 154 LBS

## 2024-10-07 VITALS — DIASTOLIC BLOOD PRESSURE: 82 MMHG | RESPIRATION RATE: 16 BRPM | SYSTOLIC BLOOD PRESSURE: 130 MMHG

## 2024-10-07 DIAGNOSIS — M35.1 OTHER OVERLAP SYNDROMES: ICD-10-CM

## 2024-10-07 DIAGNOSIS — J84.9 INTERSTITIAL PULMONARY DISEASE, UNSPECIFIED: ICD-10-CM

## 2024-10-07 PROCEDURE — G2211 COMPLEX E/M VISIT ADD ON: CPT | Mod: NC

## 2024-10-07 PROCEDURE — 99214 OFFICE O/P EST MOD 30 MIN: CPT

## 2024-10-08 ENCOUNTER — APPOINTMENT (OUTPATIENT)
Dept: RHEUMATOLOGY | Facility: CLINIC | Age: 34
End: 2024-10-08
Payer: COMMERCIAL

## 2024-10-08 ENCOUNTER — TRANSCRIPTION ENCOUNTER (OUTPATIENT)
Age: 34
End: 2024-10-08

## 2024-10-08 DIAGNOSIS — L98.491 NON-PRESSURE CHRONIC ULCER OF SKIN OF OTHER SITES LIMITED TO BREAKDOWN OF SKIN: ICD-10-CM

## 2024-10-08 DIAGNOSIS — M34.9 SYSTEMIC SCLEROSIS, UNSPECIFIED: ICD-10-CM

## 2024-10-08 PROCEDURE — G2211 COMPLEX E/M VISIT ADD ON: CPT | Mod: 95

## 2024-10-08 PROCEDURE — 99213 OFFICE O/P EST LOW 20 MIN: CPT | Mod: 95

## 2024-10-09 ENCOUNTER — TRANSCRIPTION ENCOUNTER (OUTPATIENT)
Age: 34
End: 2024-10-09

## 2024-10-10 RX ORDER — TRIAMCINOLONE ACETONIDE 40 MG/ML
40 SUSPENSION INTRA-ARTERIAL; INTRAMUSCULAR
Qty: 1 | Refills: 0 | Status: COMPLETED | OUTPATIENT
Start: 2024-10-04

## 2024-10-10 NOTE — REVIEW OF SYSTEMS
[Cough] : cough [Heartburn] : heartburn [Anxiety] : anxiety [Negative] : Heme/Lymph [As Noted in HPI] : as noted in HPI [Joint Pain] : no joint pain [FreeTextEntry2] : stable wt battling recurrent viral infections - RESOLVED- progressive wt gain [FreeTextEntry7] : on 20 mg of omeprazole in the morning and 40 mg of famotidine at night for reflux which is controlled [de-identified] : laser tx for telangiectasias and digital pitting - nothing new  [de-identified] : improved continues sertraline

## 2024-10-10 NOTE — ASSESSMENT
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 AVN (not clearly defined), advanced raynauds, telangiectasias presents for routine follow-up - frustrated with unintentioinal wt gain > 15 lbs in 6m   1)  MCTD/Limited Scleroderma:  SHERI 1:2560S, + RNP > 8.0, SM 3.8, + RF 45 (neg CCP) w/ CK > 200 (intermittently) but no overt weakness now or in past.   All other serologies to include APS neg w/ nl C3/4, dsDNA, SSA/B - stable on 2 gm MMF, felodipine 10 mg daily and HCQ 200mg daily (72kg = 2.7 mg/kg) Primary sx:  Raynauds, sclerodactyly, telangiectasias, mild sicca/ keratoconjunctivitis, NEW onset calcinosis palmar surface both hands and heels of both feet.. 12/21.. in addition to low grade fevers, arthralgias / myalgias and fatigue when "flaring". Added MMF 3 gm daily initially (well tolerated )  w/ HCQ (200 mg= 2.85mg/kg @ 70-72kg) with routine opth exam, last Oph 2/20/24 stable.  Pulm function improved, calcinosis resolved.. and lowered MMF to 2 gm  3/22- and continues to  remain stable.  No need to adjust tx, also note less frequent infections.  When doesn't have URI feels well exercises without limitation, since last visit and no recent URI (on lower dose MMF)  - NEW onset vasculitic/ or trauma to tip of R2 digit..  Given 40 mg kenalog and advised to treat with 20 mg pred for next several days - possible vasculopathy... then decrease to 10 mg as soon as better.  Additionally advised to start Nitropaste- 0.25-0.5 " up to 6 x daily if tolerated.  Wear splint to protect end of finger- leaving nothing in direct contact.. will f/u with TEB on Tues.  Has appt with pulm in 3 days..  Summary Status by symptom: - Raynaud's moderate to severe at times, tried and failed to tolerate nifedipine/ amlodipine now on felodipine at 10 mg and doing well now- except as noted above.  Noted digital pitting -.. nothing for past few ys.. .  till now..   - Sclerodactyly can't really say if any better but definitely not worse. ROM limited by tightness but  str good and functionally without limitations at this point. No recommendation for changes to tx at this time. - GERD (presumed esophageal dysmotility) severe at times w/ dx H Pylori 12/20 completed triple tx, not well tolerated, now sx better on daily PPI - lansoprazole 30 mg and H2 40 mg.  Unable to miss dose of PPI Working w/ Dr Red Last GI visit 06/2024- stable did not feel EGD necessary. . Excellent with NP strategies-  - Telangiectasias/ Calcinosis:  No limitations in daily activity no additional recommendations at this time ----Moderate to severe over face, chest s/p laser tx in past and again working with Dr. Rutherford now.... pleased with outcome.  Calcinosis noted 12/21- stable not painful.. if progresses b/l hands and now noted on heels of both feet.. working with podiatry q 6 m reduced through scraping.. will refer to OT for hand tx as needed (not necessary at this time.. though would be helpful given sclerodactyly -not necessary at this point) - Sclerodactyly: Moderate in limited pattern, distal to PIPs and around mouth ..stable x many ys and hands look sl better. No recommendations for changes to treatment at this time. -Cardiopulmonary:  Routine PFTs w/ DLCO 79-> 74-> 68 (2018)-> 70 (2019)-> 58 (3/21)-> 68 (12/22) w/ volumes well preserved over time. 10/23 (DLCO not assessed at this visit).. no reported hx of pleuropericardial effusion and completely denies SOB/JENSEN  cough or change in activity tolerance (exercises routinely).  updated CTScan 9/22-> 10/23 actually improved with mild- moderate resolution of GGO now on MMF..stable overall. NEEDS repeat DLCO  Last Echo 08/23-8/24: thickening of the anterior mitral leaflet especially at the tip, trace-mild mitral insufficiency - stable . - Serositis: elevated LFTs to 900 in past x 1 but repeat studies nl as of 02/24-> still 8/24  etiology unclear. No renal dysfunction, no pleural/pericardial effusions.    Proteinuria: 02/24 no proteinuria. Intermittently + 200-400mg -> nl 8/24 - Myopathy:  mildly elevated CK max 259 7/20... since 109-217 w/ no previous weakness now or in past still nl (8/24) NOTE:  3/21 Myomarker 3 NEG 2019 mild proteinuria PCR 0.4-> nl now  neg REGGIE, Scl70, RNA polymerase, ANCA  - 2017 adm to [place] w/ PNA + response to antibiotics w/ + response immediately - 2015/2016 she went off Plaquenil but restarted it because of the Raynaud's/digital pits, restarted and sx stable  2) Sleep: much improved following trail amitriptyline 10mg has since discontinued since the holidays 2023- anxious about progressive wt gain, switched to cyclobenzaprine at 5 mg- tolerated well, no change in wt.. and resolution of persistent  neck pain/ stiffness,   No recent use of  Ambien when trouble falling asleep-  3) Anxiety- stable on sertraline 50mg daily, followed by MH counselor. No recommendation for changes to tx at this time.  4)  Hypothyroidism on replacement w/ nl TSH 02/24-> still 8/24   5) Family planning: Oral contraceptive use: changed brands to a low estrogen containing pill- currently off, not in relationship, denies sexual activity.  Discussed current reproductive guidelines .... and is interested in pregnancy in next few ys. Discussed use of MMF and need to stop 6-8 wks (at minimum) before conception... understood clearly, will contact - unchanged today -No updates on family planning, patient is aware teratogenic effects MMF, need to notify provider if actively pursuing family   6)  OA hips: primary vs. secondary. ? AVN (need updated eval). bilateral R> L w/ limited ROM and daily pain worse at times, doing well now. No change in intermitted hip and improvement in limited ROM R>L continues to work closely with /PT student. No additional recommendation for imaging or tx at this time. Xray Hips:  moderate OA, etiology NOTE:  Tx  acupuncture and PT w/ limited benefit   7) Wt Gain:   wt issues persist,... meeting with ENdo 10/29/24.. Nl TFTs, exercises routinely and reports healthy diet.  2018  was 120-> 2020 up to 130-> 2021 increased to 145 (stable here for several years)-> 2023 -> 155 then stable for past year0> 8.24 noted up to 160. unintentional, despite eating healthy and exercising routinely.  Reviewed med list, nothing overtly obesegenic.  Will look at [email or web address] trace for counting calories and tracking activity  Menses regular with no significant change.   Updated vaccines: - COVID 12/23 - Shingles needed  - annual HD influenza last dose 10/3/24 - PNA:  has completed PCV 13/ PNA 23, would suggest 20 if continues with recurrent infections  - RSV:  should consider 2/2 high risk exposure and immunosuppression    Plan:  - Continue on 2.5 mg of ambien can go up to 10 mg PRN for sleep disruption when sick only  - continue cyclobenzaprine 5 mg qhs as needed  - Start Nitropaste:  0.25-0.5" as needed and tolerated q 6 hs for digital lesion.. continue felodipine at 10 mg..  If wound progresses will need to add ASA (baby ASA)   - given 40 mg Kenalog today to reduce any possible inflammation at finger- and take 10-20 mg pred as needed next few days-will reassess in few days..   - Continue  q am lansoprazole and  famotidine at HS   - Will need updated COVID (? annually based on recommendations- availability) , still no Shingrix again recommended and PCV 20/ RSV   - complete labs before next visit -   - Follow-up specialists as scheduled gastro (Dr. Red), pulm (Dr. Mei),- please get full PFTs/ DLCO this year..(DLCO was not done for past 2 ys - NEEDED now)- updated echo 8/24.. (Dr. Jeffrey Pate [phone]) ....at least annually  - No urogyn referral needed at this time but will continue to reassess  - Continue HCQ... 200 mg routinely... eye exam q 6m  - Recommended stopping MMF 6 m prior to conception only.. then assess for disease stability... would likely need to consider AZA as replacement (no change at this time)    - continue to work with derm  - appt with endo to address wt gain - unintentional..   - Is aware to call if there is any change in her underlying symptoms.  - Keep using mometasone nasal spray through winter months PRN..   - RTO 3 m- but TEB next week

## 2024-10-10 NOTE — PHYSICAL EXAM
[General Appearance - Alert] : alert [General Appearance - In No Acute Distress] : in no acute distress [General Appearance - Well Nourished] : well nourished [General Appearance - Well Developed] : well developed [General Appearance - Well-Appearing] : healthy appearing [Sclera] : the sclera and conjunctiva were normal [Outer Ear] : the ears and nose were normal in appearance [Examination Of The Oral Cavity] : the lips and gums were normal [Nasal Cavity] : the nasal mucosa and septum were normal [Oropharynx] : the oropharynx was normal [Neck Appearance] : the appearance of the neck was normal [Respiration, Rhythm And Depth] : normal respiratory rhythm and effort [Exaggerated Use Of Accessory Muscles For Inspiration] : no accessory muscle use [Auscultation Breath Sounds / Voice Sounds] : lungs were clear to auscultation bilaterally [Heart Rate And Rhythm] : heart rate was normal and rhythm regular [Heart Sounds] : normal S1 and S2 [Heart Sounds Gallop] : no gallops [Murmurs] : no murmurs [Heart Sounds Pericardial Friction Rub] : no pericardial rub [Edema] : there was no peripheral edema [Veins - Varicosity Changes] : there were no varicosital changes [Cervical Lymph Nodes Enlarged Posterior Bilaterally] : posterior cervical [Supraclavicular Lymph Nodes Enlarged Bilaterally] : supraclavicular [No CVA Tenderness] : no ~M costovertebral angle tenderness [No Spinal Tenderness] : no spinal tenderness [Skin Color & Pigmentation] : normal skin color and pigmentation [Skin Turgor] : normal skin turgor [] : no rash [Sensation] : the sensory exam was normal to light touch and pinprick [Motor Exam] : the motor exam was normal [Oriented To Time, Place, And Person] : oriented to person, place, and time [Impaired Insight] : insight and judgment were intact [Affect] : the affect was normal [Abnormal Walk] : normal gait [Nail Clubbing] : no clubbing  or cyanosis of the fingernails [Musculoskeletal - Swelling] : no joint swelling seen [Motor Tone] : muscle strength and tone were normal [FreeTextEntry1] : R hip rROM w/o POM 15-20 degrees, L hip rROM w/o POM 10 degrees; maintaining limited ROM, R1 IP b/l TTP mild lateral deviation. Previous exam: Has bunion on R 1 MTP; sclerodactyly from MCPs and distally lacking fROM but  w/ good  strength; R hip severely limited with no pom- minimal internal- ; L hip no Pom limited external 10-20 and nl interna. all other joints nl.  no synovitis

## 2024-10-10 NOTE — HISTORY OF PRESENT ILLNESS
[FreeTextEntry1] : 10/4/24 Acute new onset digital ulceration- stage I .  no known trauma but repetative injury given location..  Also c/w felodipine 10 mg daily.. other agents not tolerated ... BP stable on this dose.. and well tolerated  - updated Echo stable.. no evidence of PH 8/24  - Updated PFTs 9/26/24 again only did spirometry.. FVC 97% no TLC/ DCLO done..  - seen by GI no need for EGD, adjusted lansoprazole 15 mg am/ Famotidine at HS - Still concerned about wt gain.. scheduled to meet with Endo few wks.. stable since last visit..  - MMF 2 gm consistent with dosing  - wt issues persist,... meeting with ENdo 10/29/24.. Nl TFTs, exercises routinely and reports healthy diet.  2018  was 120-> 2020 up to 130-> 2021 increased to 145 (stable here for several years)-> 2023 -> 155 then stable for past year0> 8.24 noted up to 160.  concerned about pulmonary function in setting of ILD (last CTscan chest 10/23 with stable patchy GGO in peripheral posterior aspects of both lower lobes.  No honeycombing- calcified nodules are noted in both lungs. NO pleural effusions..   Serositis: nl LFTs 02/24.-> 8/24 still.  No renal dysfunction, no pleural/pericardial effusions. No proteinuria 02/24 Myopathy: nl CK 02/24 w/o weakness, no cardiopulmonary sx.  8/14/24 nl CRP 6, ER 39 -> 26neg/nl: UA, CBC, CMP, C3/C4, UPCR, TSH, CK, DsDNA   Sleep better with cyclobenzaprine 5 mg ..   -Sertraline tolerating well and seeing mental health counselor stable dose x many ys, doesn't feel this is reason for wt gain.  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  MCTD/Limited Scleroderma:  consisting of Raynaud's, GERD (presumed esophageal dysmotility), telangiectases, sclerodactyly, RNP Ab.   2.  GERD. Pt on ranitidine. GERD symptoms on and off. No dysphagia.  3.  Hypothyroidism 4.  Myopathy: mild elevation of CKs on a couple of occasions 5.  Plaquenil use:  no ocular symptoms.  6.  Oral contraceptive use: changed brands to a low estrogen containing pill. 7.  Raynauds:  more problematic in the hands than feet. with pitting  .  9.  Avascular necrosis of hips:   bilateral.  moderate to severe R > L w/ advanced DJD   An x-ray was read as moderate OA of the hip (acetabular sclerosis and osteophytes).   10. PNa 2017  Antibiotics were administered, and she improved. 12.  Resting tachycardia: no dyspnea or chest pain

## 2024-10-10 NOTE — REVIEW OF SYSTEMS
[Cough] : cough [Heartburn] : heartburn [Anxiety] : anxiety [Negative] : Heme/Lymph [As Noted in HPI] : as noted in HPI [Joint Pain] : no joint pain [FreeTextEntry2] : stable wt battling recurrent viral infections - RESOLVED- progressive wt gain [FreeTextEntry7] : on 20 mg of omeprazole in the morning and 40 mg of famotidine at night for reflux which is controlled [de-identified] : laser tx for telangiectasias and digital pitting - nothing new  [de-identified] : improved continues sertraline

## 2024-10-10 NOTE — ASSESSMENT
[FreeTextEntry1] : 35 yo wf with long hx Limited SSc, MCTD + RNP w/  advanced DJD b/l hips ? AVN (not clearly defined), advanced raynauds, telangiectasias presents for routine follow-up - frustrated with unintentioinal wt gain > 15 lbs in 6m   1)  MCTD/Limited Scleroderma:  SHERI 1:2560S, + RNP > 8.0, SM 3.8, + RF 45 (neg CCP) w/ CK > 200 (intermittently) but no overt weakness now or in past.   All other serologies to include APS neg w/ nl C3/4, dsDNA, SSA/B - stable on 2 gm MMF, felodipine 10 mg daily and HCQ 200mg daily (72kg = 2.7 mg/kg) Primary sx:  Raynauds, sclerodactyly, telangiectasias, mild sicca/ keratoconjunctivitis, NEW onset calcinosis palmar surface both hands and heels of both feet.. 12/21.. in addition to low grade fevers, arthralgias / myalgias and fatigue when "flaring". Added MMF 3 gm daily initially (well tolerated )  w/ HCQ (200 mg= 2.85mg/kg @ 70-72kg) with routine opth exam, last Oph 2/20/24 stable.  Pulm function improved, calcinosis resolved.. and lowered MMF to 2 gm  3/22- and continues to  remain stable.  No need to adjust tx, also note less frequent infections.  When doesn't have URI feels well exercises without limitation, since last visit and no recent URI (on lower dose MMF)  - NEW onset vasculitic/ or trauma to tip of R2 digit..  Given 40 mg kenalog and advised to treat with 20 mg pred for next several days - possible vasculopathy... then decrease to 10 mg as soon as better.  Additionally advised to start Nitropaste- 0.25-0.5 " up to 6 x daily if tolerated.  Wear splint to protect end of finger- leaving nothing in direct contact.. will f/u with TEB on Tues.  Has appt with pulm in 3 days..  Summary Status by symptom: - Raynaud's moderate to severe at times, tried and failed to tolerate nifedipine/ amlodipine now on felodipine at 10 mg and doing well now- except as noted above.  Noted digital pitting -.. nothing for past few ys.. .  till now..   - Sclerodactyly can't really say if any better but definitely not worse. ROM limited by tightness but  str good and functionally without limitations at this point. No recommendation for changes to tx at this time. - GERD (presumed esophageal dysmotility) severe at times w/ dx H Pylori 12/20 completed triple tx, not well tolerated, now sx better on daily PPI - lansoprazole 30 mg and H2 40 mg.  Unable to miss dose of PPI Working w/ Dr Red Last GI visit 06/2024- stable did not feel EGD necessary. . Excellent with NP strategies-  - Telangiectasias/ Calcinosis:  No limitations in daily activity no additional recommendations at this time ----Moderate to severe over face, chest s/p laser tx in past and again working with Dr. Rutherford now.... pleased with outcome.  Calcinosis noted 12/21- stable not painful.. if progresses b/l hands and now noted on heels of both feet.. working with podiatry q 6 m reduced through scraping.. will refer to OT for hand tx as needed (not necessary at this time.. though would be helpful given sclerodactyly -not necessary at this point) - Sclerodactyly: Moderate in limited pattern, distal to PIPs and around mouth ..stable x many ys and hands look sl better. No recommendations for changes to treatment at this time. -Cardiopulmonary:  Routine PFTs w/ DLCO 79-> 74-> 68 (2018)-> 70 (2019)-> 58 (3/21)-> 68 (12/22) w/ volumes well preserved over time. 10/23 (DLCO not assessed at this visit).. no reported hx of pleuropericardial effusion and completely denies SOB/JENSEN  cough or change in activity tolerance (exercises routinely).  updated CTScan 9/22-> 10/23 actually improved with mild- moderate resolution of GGO now on MMF..stable overall. NEEDS repeat DLCO  Last Echo 08/23-8/24: thickening of the anterior mitral leaflet especially at the tip, trace-mild mitral insufficiency - stable . - Serositis: elevated LFTs to 900 in past x 1 but repeat studies nl as of 02/24-> still 8/24  etiology unclear. No renal dysfunction, no pleural/pericardial effusions.    Proteinuria: 02/24 no proteinuria. Intermittently + 200-400mg -> nl 8/24 - Myopathy:  mildly elevated CK max 259 7/20... since 109-217 w/ no previous weakness now or in past still nl (8/24) NOTE:  3/21 Myomarker 3 NEG 2019 mild proteinuria PCR 0.4-> nl now  neg REGGIE, Scl70, RNA polymerase, ANCA  - 2017 adm to [place] w/ PNA + response to antibiotics w/ + response immediately - 2015/2016 she went off Plaquenil but restarted it because of the Raynaud's/digital pits, restarted and sx stable  2) Sleep: much improved following trail amitriptyline 10mg has since discontinued since the holidays 2023- anxious about progressive wt gain, switched to cyclobenzaprine at 5 mg- tolerated well, no change in wt.. and resolution of persistent  neck pain/ stiffness,   No recent use of  Ambien when trouble falling asleep-  3) Anxiety- stable on sertraline 50mg daily, followed by MH counselor. No recommendation for changes to tx at this time.  4)  Hypothyroidism on replacement w/ nl TSH 02/24-> still 8/24   5) Family planning: Oral contraceptive use: changed brands to a low estrogen containing pill- currently off, not in relationship, denies sexual activity.  Discussed current reproductive guidelines .... and is interested in pregnancy in next few ys. Discussed use of MMF and need to stop 6-8 wks (at minimum) before conception... understood clearly, will contact - unchanged today -No updates on family planning, patient is aware teratogenic effects MMF, need to notify provider if actively pursuing family   6)  OA hips: primary vs. secondary. ? AVN (need updated eval). bilateral R> L w/ limited ROM and daily pain worse at times, doing well now. No change in intermitted hip and improvement in limited ROM R>L continues to work closely with /PT student. No additional recommendation for imaging or tx at this time. Xray Hips:  moderate OA, etiology NOTE:  Tx  acupuncture and PT w/ limited benefit   7) Wt Gain:   wt issues persist,... meeting with ENdo 10/29/24.. Nl TFTs, exercises routinely and reports healthy diet.  2018  was 120-> 2020 up to 130-> 2021 increased to 145 (stable here for several years)-> 2023 -> 155 then stable for past year0> 8.24 noted up to 160. unintentional, despite eating healthy and exercising routinely.  Reviewed med list, nothing overtly obesegenic.  Will look at [employer] trace for counting calories and tracking activity  Menses regular with no significant change.   Updated vaccines: - COVID 12/23 - Shingles needed  - annual HD influenza last dose 10/3/24 - PNA:  has completed PCV 13/ PNA 23, would suggest 20 if continues with recurrent infections  - RSV:  should consider 2/2 high risk exposure and immunosuppression    Plan:  - Continue on 2.5 mg of ambien can go up to 10 mg PRN for sleep disruption when sick only  - continue cyclobenzaprine 5 mg qhs as needed  - Start Nitropaste:  0.25-0.5" as needed and tolerated q 6 hs for digital lesion.. continue felodipine at 10 mg..  If wound progresses will need to add ASA (baby ASA)   - given 40 mg Kenalog today to reduce any possible inflammation at finger- and take 10-20 mg pred as needed next few days-will reassess in few days..   - Continue  q am lansoprazole and  famotidine at HS   - Will need updated COVID (? annually based on recommendations- availability) , still no Shingrix again recommended and PCV 20/ RSV   - complete labs before next visit -   - Follow-up specialists as scheduled gastro (Dr. Red), pulm (Dr. Mei),- please get full PFTs/ DLCO this year..(DLCO was not done for past 2 ys - NEEDED now)- updated echo 8/24.. (Dr. Jeffrey Pate [phone]) ....at least annually  - No urogyn referral needed at this time but will continue to reassess  - Continue HCQ... 200 mg routinely... eye exam q 6m  - Recommended stopping MMF 6 m prior to conception only.. then assess for disease stability... would likely need to consider AZA as replacement (no change at this time)    - continue to work with derm  - appt with endo to address wt gain - unintentional..   - Is aware to call if there is any change in her underlying symptoms.  - Keep using mometasone nasal spray through winter months PRN..   - RTO 3 m- but TEB next week

## 2024-10-23 ENCOUNTER — APPOINTMENT (OUTPATIENT)
Dept: RHEUMATOLOGY | Facility: CLINIC | Age: 34
End: 2024-10-23
Payer: COMMERCIAL

## 2024-10-23 VITALS
SYSTOLIC BLOOD PRESSURE: 134 MMHG | TEMPERATURE: 97.3 F | HEIGHT: 61 IN | WEIGHT: 154 LBS | DIASTOLIC BLOOD PRESSURE: 88 MMHG | BODY MASS INDEX: 29.07 KG/M2 | OXYGEN SATURATION: 97 % | HEART RATE: 103 BPM

## 2024-10-23 DIAGNOSIS — K21.9 GASTRO-ESOPHAGEAL REFLUX DISEASE W/OUT ESOPHAGITIS: ICD-10-CM

## 2024-10-23 DIAGNOSIS — L98.491 NON-PRESSURE CHRONIC ULCER OF SKIN OF OTHER SITES LIMITED TO BREAKDOWN OF SKIN: ICD-10-CM

## 2024-10-23 DIAGNOSIS — M35.1 OTHER OVERLAP SYNDROMES: ICD-10-CM

## 2024-10-23 DIAGNOSIS — K22.4 DYSKINESIA OF ESOPHAGUS: ICD-10-CM

## 2024-10-23 DIAGNOSIS — R91.8 OTHER NONSPECIFIC ABNORMAL FINDING OF LUNG FIELD: ICD-10-CM

## 2024-10-23 DIAGNOSIS — F51.02 ADJUSTMENT INSOMNIA: ICD-10-CM

## 2024-10-23 DIAGNOSIS — M34.9 SYSTEMIC SCLEROSIS, UNSPECIFIED: ICD-10-CM

## 2024-10-23 PROCEDURE — G2211 COMPLEX E/M VISIT ADD ON: CPT | Mod: NC

## 2024-10-23 PROCEDURE — 99213 OFFICE O/P EST LOW 20 MIN: CPT

## 2024-10-25 ENCOUNTER — APPOINTMENT (OUTPATIENT)
Dept: PULMONOLOGY | Facility: CLINIC | Age: 34
End: 2024-10-25
Payer: COMMERCIAL

## 2024-10-25 VITALS — WEIGHT: 153 LBS | BODY MASS INDEX: 28.89 KG/M2 | HEIGHT: 61 IN

## 2024-10-25 DIAGNOSIS — J84.9 INTERSTITIAL PULMONARY DISEASE, UNSPECIFIED: ICD-10-CM

## 2024-10-25 PROCEDURE — 94010 BREATHING CAPACITY TEST: CPT

## 2024-10-25 PROCEDURE — 85018 HEMOGLOBIN: CPT | Mod: QW

## 2024-10-25 PROCEDURE — 94729 DIFFUSING CAPACITY: CPT

## 2024-10-25 PROCEDURE — 94727 GAS DIL/WSHOT DETER LNG VOL: CPT

## 2024-10-30 ENCOUNTER — APPOINTMENT (OUTPATIENT)
Dept: CT IMAGING | Facility: CLINIC | Age: 34
End: 2024-10-30

## 2024-10-30 PROCEDURE — 71250 CT THORAX DX C-: CPT

## 2024-11-12 ENCOUNTER — NON-APPOINTMENT (OUTPATIENT)
Age: 34
End: 2024-11-12

## 2024-11-12 DIAGNOSIS — E04.1 NONTOXIC SINGLE THYROID NODULE: ICD-10-CM

## 2024-11-25 ENCOUNTER — TRANSCRIPTION ENCOUNTER (OUTPATIENT)
Age: 34
End: 2024-11-25

## 2024-11-25 RX ORDER — OMEPRAZOLE 20 MG/1
20 CAPSULE, DELAYED RELEASE ORAL DAILY
Qty: 90 | Refills: 3 | Status: ACTIVE | COMMUNITY
Start: 2024-11-25 | End: 1900-01-01

## 2024-12-04 ENCOUNTER — TRANSCRIPTION ENCOUNTER (OUTPATIENT)
Age: 34
End: 2024-12-04

## 2024-12-17 ASSESSMENT — KERATOMETRY
OS_AXISANGLE_DEGREES: 073
OD_K2POWER_DIOPTERS: 43.75
OD_K1POWER_DIOPTERS: 42.50
OD_AXISANGLE_DEGREES: 089
OS_K1POWER_DIOPTERS: 42.50
METHOD_AUTO_MANUAL: AUTO
OS_K2POWER_DIOPTERS: 43.50

## 2024-12-18 ENCOUNTER — APPOINTMENT (OUTPATIENT)
Dept: DERMATOLOGY | Facility: CLINIC | Age: 34
End: 2024-12-18
Payer: COMMERCIAL

## 2024-12-18 DIAGNOSIS — I78.1 NEVUS, NON-NEOPLASTIC: ICD-10-CM

## 2024-12-18 DIAGNOSIS — M35.1 OTHER OVERLAP SYNDROMES: ICD-10-CM

## 2024-12-18 PROCEDURE — 17106 DSTR CUT VSC PRLF LES<10SQCM: CPT

## 2024-12-24 ENCOUNTER — APPOINTMENT (OUTPATIENT)
Dept: ENDOCRINOLOGY | Facility: CLINIC | Age: 34
End: 2024-12-24
Payer: COMMERCIAL

## 2024-12-24 VITALS
HEIGHT: 61 IN | BODY MASS INDEX: 28.13 KG/M2 | SYSTOLIC BLOOD PRESSURE: 122 MMHG | WEIGHT: 149 LBS | DIASTOLIC BLOOD PRESSURE: 84 MMHG | HEART RATE: 98 BPM | OXYGEN SATURATION: 99 %

## 2024-12-24 DIAGNOSIS — Z78.9 OTHER SPECIFIED HEALTH STATUS: ICD-10-CM

## 2024-12-24 DIAGNOSIS — Z87.39 PERSONAL HISTORY OF OTHER DISEASES OF THE MUSCULOSKELETAL SYSTEM AND CONNECTIVE TISSUE: ICD-10-CM

## 2024-12-24 DIAGNOSIS — Z80.9 FAMILY HISTORY OF MALIGNANT NEOPLASM, UNSPECIFIED: ICD-10-CM

## 2024-12-24 DIAGNOSIS — E04.1 NONTOXIC SINGLE THYROID NODULE: ICD-10-CM

## 2024-12-24 DIAGNOSIS — E03.9 HYPOTHYROIDISM, UNSPECIFIED: ICD-10-CM

## 2024-12-24 PROCEDURE — G2211 COMPLEX E/M VISIT ADD ON: CPT | Mod: NC

## 2024-12-24 PROCEDURE — 99204 OFFICE O/P NEW MOD 45 MIN: CPT

## 2025-01-03 ENCOUNTER — APPOINTMENT (OUTPATIENT)
Dept: ULTRASOUND IMAGING | Facility: CLINIC | Age: 35
End: 2025-01-03
Payer: COMMERCIAL

## 2025-01-03 LAB
T4 FREE SERPL-MCNC: 1.4 NG/DL
THYROPEROXIDASE AB SERPL IA-ACNC: 33.8 IU/ML
TSH SERPL-ACNC: 0.39 UIU/ML

## 2025-01-03 PROCEDURE — 76536 US EXAM OF HEAD AND NECK: CPT

## 2025-01-13 ENCOUNTER — NON-APPOINTMENT (OUTPATIENT)
Age: 35
End: 2025-01-13

## 2025-02-18 ENCOUNTER — APPOINTMENT (OUTPATIENT)
Dept: OPHTHALMOLOGY | Facility: CLINIC | Age: 35
End: 2025-02-18

## 2025-02-19 ENCOUNTER — NON-APPOINTMENT (OUTPATIENT)
Age: 35
End: 2025-02-19

## 2025-02-19 ENCOUNTER — APPOINTMENT (OUTPATIENT)
Dept: RHEUMATOLOGY | Facility: CLINIC | Age: 35
End: 2025-02-19

## 2025-02-19 VITALS
HEART RATE: 111 BPM | OXYGEN SATURATION: 100 % | BODY MASS INDEX: 28.32 KG/M2 | DIASTOLIC BLOOD PRESSURE: 82 MMHG | HEIGHT: 61 IN | SYSTOLIC BLOOD PRESSURE: 140 MMHG | WEIGHT: 150 LBS | TEMPERATURE: 97 F

## 2025-02-19 DIAGNOSIS — F41.9 ANXIETY DISORDER, UNSPECIFIED: ICD-10-CM

## 2025-02-19 DIAGNOSIS — E04.1 NONTOXIC SINGLE THYROID NODULE: ICD-10-CM

## 2025-02-19 DIAGNOSIS — M35.1 OTHER OVERLAP SYNDROMES: ICD-10-CM

## 2025-02-19 DIAGNOSIS — Z79.899 OTHER LONG TERM (CURRENT) DRUG THERAPY: ICD-10-CM

## 2025-02-19 DIAGNOSIS — J84.9 INTERSTITIAL PULMONARY DISEASE, UNSPECIFIED: ICD-10-CM

## 2025-02-19 DIAGNOSIS — M34.9 SYSTEMIC SCLEROSIS, UNSPECIFIED: ICD-10-CM

## 2025-02-19 PROCEDURE — 99214 OFFICE O/P EST MOD 30 MIN: CPT

## 2025-02-19 PROCEDURE — G2211 COMPLEX E/M VISIT ADD ON: CPT | Mod: NC

## 2025-02-20 ENCOUNTER — APPOINTMENT (OUTPATIENT)
Dept: DERMATOLOGY | Facility: CLINIC | Age: 35
End: 2025-02-20
Payer: COMMERCIAL

## 2025-02-20 PROCEDURE — 99213 OFFICE O/P EST LOW 20 MIN: CPT | Mod: 24

## 2025-02-20 RX ORDER — HYDROCORTISONE BUTYRATE 1 MG/ML
0.1 SOLUTION TOPICAL
Qty: 1 | Refills: 1 | Status: ACTIVE | COMMUNITY
Start: 2025-02-20 | End: 1900-01-01

## 2025-03-31 ENCOUNTER — RX RENEWAL (OUTPATIENT)
Age: 35
End: 2025-03-31

## 2025-04-17 ENCOUNTER — APPOINTMENT (OUTPATIENT)
Dept: OPHTHALMOLOGY | Facility: CLINIC | Age: 35
End: 2025-04-17
Payer: COMMERCIAL

## 2025-04-17 ENCOUNTER — NON-APPOINTMENT (OUTPATIENT)
Age: 35
End: 2025-04-17

## 2025-04-17 PROCEDURE — 92083 EXTENDED VISUAL FIELD XM: CPT

## 2025-04-17 PROCEDURE — 92012 INTRM OPH EXAM EST PATIENT: CPT

## 2025-05-07 ENCOUNTER — RX RENEWAL (OUTPATIENT)
Age: 35
End: 2025-05-07

## 2025-05-08 ENCOUNTER — APPOINTMENT (OUTPATIENT)
Dept: DERMATOLOGY | Facility: CLINIC | Age: 35
End: 2025-05-08
Payer: COMMERCIAL

## 2025-05-08 DIAGNOSIS — I78.1 NEVUS, NON-NEOPLASTIC: ICD-10-CM

## 2025-05-08 DIAGNOSIS — M35.1 OTHER OVERLAP SYNDROMES: ICD-10-CM

## 2025-05-08 PROCEDURE — 17106 DSTR CUT VSC PRLF LES<10SQCM: CPT

## 2025-05-08 RX ORDER — AZELAIC ACID 0.15 G/G
15 GEL TOPICAL
Qty: 1 | Refills: 2 | Status: ACTIVE | COMMUNITY
Start: 2025-05-08 | End: 1900-01-01

## 2025-05-08 RX ORDER — AZELAIC ACID 0.15 G/G
15 GEL TOPICAL
Qty: 1 | Refills: 2 | Status: DISCONTINUED | COMMUNITY
Start: 2025-05-08 | End: 2025-05-08

## 2025-06-25 ENCOUNTER — APPOINTMENT (OUTPATIENT)
Dept: RHEUMATOLOGY | Facility: CLINIC | Age: 35
End: 2025-06-25

## 2025-06-25 VITALS
BODY MASS INDEX: 27.94 KG/M2 | DIASTOLIC BLOOD PRESSURE: 88 MMHG | OXYGEN SATURATION: 100 % | TEMPERATURE: 98.4 F | SYSTOLIC BLOOD PRESSURE: 130 MMHG | HEIGHT: 61 IN | WEIGHT: 148 LBS | HEART RATE: 107 BPM

## 2025-06-25 PROCEDURE — 90677 PCV20 VACCINE IM: CPT

## 2025-06-25 PROCEDURE — 99214 OFFICE O/P EST MOD 30 MIN: CPT | Mod: 25

## 2025-06-25 PROCEDURE — G0009: CPT

## 2025-06-25 PROCEDURE — G2211 COMPLEX E/M VISIT ADD ON: CPT | Mod: NC

## 2025-06-26 RX ORDER — RESPIRATORY SYNCYTIAL VIRUS VACCINE 50 UG/.5ML
50 SUSPENSION INTRAMUSCULAR
Qty: 1 | Refills: 0 | Status: ACTIVE | COMMUNITY
Start: 2025-06-25 | End: 1900-01-01

## 2025-07-03 ENCOUNTER — APPOINTMENT (OUTPATIENT)
Dept: INTERNAL MEDICINE | Facility: CLINIC | Age: 35
End: 2025-07-03
Payer: COMMERCIAL

## 2025-07-03 VITALS
HEIGHT: 61 IN | HEART RATE: 105 BPM | OXYGEN SATURATION: 96 % | WEIGHT: 148 LBS | SYSTOLIC BLOOD PRESSURE: 130 MMHG | BODY MASS INDEX: 27.94 KG/M2 | TEMPERATURE: 98.4 F | DIASTOLIC BLOOD PRESSURE: 90 MMHG

## 2025-07-03 PROCEDURE — 99395 PREV VISIT EST AGE 18-39: CPT

## 2025-07-03 RX ORDER — RESPIRATORY SYNCYTIAL VIRUS VACCINE .06; .06 MG/.5ML; MG/.5ML
120 INJECTION, POWDER, LYOPHILIZED, FOR SOLUTION INTRAMUSCULAR
Qty: 1 | Refills: 0 | Status: ACTIVE | COMMUNITY
Start: 2025-06-30

## 2025-07-03 RX ORDER — ZOSTER VACCINE RECOMBINANT, ADJUVANTED 50 MCG/0.5
50 KIT INTRAMUSCULAR
Qty: 1 | Refills: 1 | Status: ACTIVE | COMMUNITY
Start: 2025-06-25

## 2025-07-07 ENCOUNTER — APPOINTMENT (OUTPATIENT)
Dept: ENDOCRINOLOGY | Facility: CLINIC | Age: 35
End: 2025-07-07
Payer: COMMERCIAL

## 2025-07-07 VITALS
OXYGEN SATURATION: 98 % | BODY MASS INDEX: 28.32 KG/M2 | SYSTOLIC BLOOD PRESSURE: 134 MMHG | DIASTOLIC BLOOD PRESSURE: 85 MMHG | WEIGHT: 150 LBS | HEIGHT: 61 IN | HEART RATE: 102 BPM

## 2025-07-07 PROCEDURE — 99214 OFFICE O/P EST MOD 30 MIN: CPT

## 2025-07-07 PROCEDURE — G2211 COMPLEX E/M VISIT ADD ON: CPT | Mod: NC

## 2025-07-08 ENCOUNTER — NON-APPOINTMENT (OUTPATIENT)
Age: 35
End: 2025-07-08

## 2025-07-08 LAB
ALBUMIN SERPL ELPH-MCNC: 4.8 G/DL
ALP BLD-CCNC: 106 U/L
ALT SERPL-CCNC: 31 U/L
ANION GAP SERPL CALC-SCNC: 12 MMOL/L
APPEARANCE: CLEAR
AST SERPL-CCNC: 27 U/L
BACTERIA: NEGATIVE /HPF
BASOPHILS # BLD AUTO: 0.03 K/UL
BASOPHILS NFR BLD AUTO: 0.4 %
BILIRUB SERPL-MCNC: 0.3 MG/DL
BILIRUBIN URINE: NEGATIVE
BLOOD URINE: NEGATIVE
BUN SERPL-MCNC: 13 MG/DL
CALCIUM SERPL-MCNC: 9.7 MG/DL
CAST: 0 /LPF
CHLORIDE SERPL-SCNC: 101 MMOL/L
CHOLEST SERPL-MCNC: 188 MG/DL
CO2 SERPL-SCNC: 23 MMOL/L
COLOR: YELLOW
CREAT SERPL-MCNC: 0.54 MG/DL
EGFRCR SERPLBLD CKD-EPI 2021: 124 ML/MIN/1.73M2
EOSINOPHIL # BLD AUTO: 0.14 K/UL
EOSINOPHIL NFR BLD AUTO: 1.8 %
EPITHELIAL CELLS: 1 /HPF
ESTIMATED AVERAGE GLUCOSE: 103 MG/DL
GLUCOSE QUALITATIVE U: NEGATIVE MG/DL
GLUCOSE SERPL-MCNC: 91 MG/DL
HBA1C MFR BLD HPLC: 5.2 %
HCT VFR BLD CALC: 40.9 %
HDLC SERPL-MCNC: 57 MG/DL
HGB BLD-MCNC: 13 G/DL
IMM GRANULOCYTES NFR BLD AUTO: 0.3 %
KETONES URINE: NEGATIVE MG/DL
LDLC SERPL-MCNC: 119 MG/DL
LEUKOCYTE ESTERASE URINE: ABNORMAL
LYMPHOCYTES # BLD AUTO: 2.19 K/UL
LYMPHOCYTES NFR BLD AUTO: 28.6 %
MAN DIFF?: NORMAL
MCHC RBC-ENTMCNC: 27.1 PG
MCHC RBC-ENTMCNC: 31.8 G/DL
MCV RBC AUTO: 85.2 FL
MICROSCOPIC-UA: NORMAL
MONOCYTES # BLD AUTO: 0.66 K/UL
MONOCYTES NFR BLD AUTO: 8.6 %
NEUTROPHILS # BLD AUTO: 4.63 K/UL
NEUTROPHILS NFR BLD AUTO: 60.3 %
NITRITE URINE: NEGATIVE
NONHDLC SERPL-MCNC: 131 MG/DL
PH URINE: 7
PLATELET # BLD AUTO: 359 K/UL
POTASSIUM SERPL-SCNC: 4.3 MMOL/L
PROT SERPL-MCNC: 7.8 G/DL
PROTEIN URINE: NEGATIVE MG/DL
RBC # BLD: 4.8 M/UL
RBC # FLD: 13.2 %
RED BLOOD CELLS URINE: 0 /HPF
REVIEW: NORMAL
SODIUM SERPL-SCNC: 136 MMOL/L
SPECIFIC GRAVITY URINE: 1.01
T3FREE SERPL-MCNC: 2.89 PG/ML
T4 FREE SERPL-MCNC: 1.3 NG/DL
T4 FREE SERPL-MCNC: 1.4 NG/DL
T4 SERPL-MCNC: 9.7 UG/DL
TRIGL SERPL-MCNC: 65 MG/DL
TSH SERPL-ACNC: 1.34 UIU/ML
TSH SERPL-ACNC: 1.34 UIU/ML
UROBILINOGEN URINE: 0.2 MG/DL
VIT B12 SERPL-MCNC: 780 PG/ML
WBC # FLD AUTO: 7.67 K/UL
WHITE BLOOD CELLS URINE: 0 /HPF

## 2025-07-09 ENCOUNTER — APPOINTMENT (OUTPATIENT)
Dept: PULMONOLOGY | Facility: CLINIC | Age: 35
End: 2025-07-09
Payer: COMMERCIAL

## 2025-07-09 VITALS
HEART RATE: 110 BPM | WEIGHT: 150 LBS | HEIGHT: 61 IN | BODY MASS INDEX: 28.32 KG/M2 | OXYGEN SATURATION: 98 % | RESPIRATION RATE: 16 BRPM | SYSTOLIC BLOOD PRESSURE: 132 MMHG | DIASTOLIC BLOOD PRESSURE: 82 MMHG

## 2025-07-09 VITALS — HEART RATE: 93 BPM

## 2025-07-09 PROCEDURE — 99214 OFFICE O/P EST MOD 30 MIN: CPT

## 2025-07-09 PROCEDURE — G2211 COMPLEX E/M VISIT ADD ON: CPT | Mod: NC

## 2025-07-17 ENCOUNTER — TRANSCRIPTION ENCOUNTER (OUTPATIENT)
Age: 35
End: 2025-07-17

## 2025-07-21 ENCOUNTER — TRANSCRIPTION ENCOUNTER (OUTPATIENT)
Age: 35
End: 2025-07-21

## 2025-07-22 ENCOUNTER — APPOINTMENT (OUTPATIENT)
Dept: RHEUMATOLOGY | Facility: CLINIC | Age: 35
End: 2025-07-22
Payer: COMMERCIAL

## 2025-07-22 ENCOUNTER — MED ADMIN CHARGE (OUTPATIENT)
Age: 35
End: 2025-07-22

## 2025-07-22 PROCEDURE — 90471 IMMUNIZATION ADMIN: CPT

## 2025-07-22 PROCEDURE — 90678 RSV VACC PREF BIVALENT IM: CPT

## 2025-08-05 ENCOUNTER — MED ADMIN CHARGE (OUTPATIENT)
Age: 35
End: 2025-08-05

## 2025-08-05 ENCOUNTER — APPOINTMENT (OUTPATIENT)
Dept: RHEUMATOLOGY | Facility: CLINIC | Age: 35
End: 2025-08-05
Payer: COMMERCIAL

## 2025-08-05 PROCEDURE — 90471 IMMUNIZATION ADMIN: CPT

## 2025-08-05 PROCEDURE — 90750 HZV VACC RECOMBINANT IM: CPT | Mod: NC

## 2025-08-07 ENCOUNTER — APPOINTMENT (OUTPATIENT)
Dept: DERMATOLOGY | Facility: CLINIC | Age: 35
End: 2025-08-07
Payer: COMMERCIAL

## 2025-08-07 DIAGNOSIS — M35.1 OTHER OVERLAP SYNDROMES: ICD-10-CM

## 2025-08-07 DIAGNOSIS — I78.1 NEVUS, NON-NEOPLASTIC: ICD-10-CM

## 2025-08-07 PROCEDURE — 17106 DSTR CUT VSC PRLF LES<10SQCM: CPT

## 2025-09-15 ENCOUNTER — RX RENEWAL (OUTPATIENT)
Age: 35
End: 2025-09-15